# Patient Record
Sex: FEMALE | Race: WHITE | NOT HISPANIC OR LATINO | Employment: OTHER | URBAN - NONMETROPOLITAN AREA
[De-identification: names, ages, dates, MRNs, and addresses within clinical notes are randomized per-mention and may not be internally consistent; named-entity substitution may affect disease eponyms.]

---

## 2018-08-28 ENCOUNTER — HOSPITAL ENCOUNTER (OUTPATIENT)
Dept: MAMMOGRAPHY | Facility: HOSPITAL | Age: 73
Discharge: HOME | End: 2018-08-28
Admitting: NURSE PRACTITIONER

## 2018-08-28 DIAGNOSIS — Z12.39 SCREENING BREAST EXAMINATION: ICD-10-CM

## 2018-08-28 PROCEDURE — 77067 SCR MAMMO BI INCL CAD: CPT | Performed by: RADIOLOGY

## 2018-08-28 PROCEDURE — 77063 BREAST TOMOSYNTHESIS BI: CPT

## 2018-08-28 PROCEDURE — 77067 SCR MAMMO BI INCL CAD: CPT

## 2018-08-28 PROCEDURE — 77063 BREAST TOMOSYNTHESIS BI: CPT | Performed by: RADIOLOGY

## 2019-05-08 ENCOUNTER — OFFICE VISIT (OUTPATIENT)
Dept: CARDIOLOGY | Facility: CLINIC | Age: 74
End: 2019-05-08

## 2019-05-08 VITALS
WEIGHT: 199 LBS | BODY MASS INDEX: 31.98 KG/M2 | HEIGHT: 66 IN | HEART RATE: 69 BPM | OXYGEN SATURATION: 98 % | SYSTOLIC BLOOD PRESSURE: 132 MMHG | DIASTOLIC BLOOD PRESSURE: 68 MMHG

## 2019-05-08 DIAGNOSIS — R07.9 CHEST PAIN IN ADULT: Primary | ICD-10-CM

## 2019-05-08 DIAGNOSIS — I10 ESSENTIAL HYPERTENSION: ICD-10-CM

## 2019-05-08 DIAGNOSIS — R06.09 DOE (DYSPNEA ON EXERTION): ICD-10-CM

## 2019-05-08 DIAGNOSIS — E78.00 HYPERCHOLESTEROLEMIA: ICD-10-CM

## 2019-05-08 PROCEDURE — 99204 OFFICE O/P NEW MOD 45 MIN: CPT | Performed by: INTERNAL MEDICINE

## 2019-05-08 RX ORDER — LANOLIN ALCOHOL/MO/W.PET/CERES
CREAM (GRAM) TOPICAL
COMMUNITY
Start: 2018-12-12 | End: 2022-05-14

## 2019-05-08 RX ORDER — AMLODIPINE BESYLATE 5 MG/1
1 TABLET ORAL EVERY 12 HOURS
COMMUNITY
Start: 2018-07-23 | End: 2022-05-14

## 2019-05-08 RX ORDER — ASPIRIN 81 MG/1
81 TABLET ORAL DAILY
COMMUNITY
Start: 2018-07-23 | End: 2022-09-08 | Stop reason: HOSPADM

## 2019-05-08 NOTE — PROGRESS NOTES
Subjective   Chief Complaint   Patient presents with   • Shortness of Breath   • Dizziness       History of Present Illness    Patient is 74 years old white female who is here for cardiac evaluation because of dull chest tightness shortness of breath and dizziness.  This has been going on for for 5 months.  Patient thinks it is getting worse.  Chest tightness is located in the substernal and left anterior chest.  Is quite vague.  There is no radiation of the pain.  Primarily she gets very short of breath and dizzy on minimal exertion.  Patient has no known coronary artery disease but has family history of premature coronary artery disease.    Her prior cardiac work-up includes coronary angiography about 10 years ago by Dr. Ortega which apparently did not show any significant disease  She had a stress test done 5 years ago which was negative for ischemia.  Risk factors include hypertension and hyperlipidemia.  Patient has prior history of stroke with the right hemiplegia which resolved completely after thrombolyzes with TPA.  Currently she is on aspirin and Plavix.    Patient does not smoke   Past Surgical History:   Procedure Laterality Date   • CATARACT EXTRACTION     • CHOLECYSTECTOMY     • COLON SURGERY     • KNEE SURGERY Left      Family History   Problem Relation Age of Onset   • Heart disease Mother    • Heart disease Father    • Cancer Brother    • Breast cancer Paternal Aunt      Past Medical History:   Diagnosis Date   • Hyperlipidemia    • Hypertension 5/16/2016   • Stroke (CMS/HCC) 5/16/2016 2009 2015       Patient Active Problem List   Diagnosis   • Hypertension   • Stroke (CMS/HCC)   • Hypercholesterolemia   • Knee pain   • Osteoarthritis of knee   • Sleep apnea   • Status post total left knee replacement   • Chest pain in adult   • ESPINAL (dyspnea on exertion)         Social History     Tobacco Use   • Smoking status: Never Smoker   • Smokeless tobacco: Never Used   Substance Use Topics   • Alcohol use:  "No   • Drug use: No         The following portions of the patient's history were reviewed and updated as appropriate: allergies, current medications, past family history, past medical history, past social history, past surgical history and problem list.    Allergies   Allergen Reactions   • Morphine And Related          Current Outpatient Medications:   •  amLODIPine (NORVASC) 5 MG tablet, Take 1 tablet by mouth Every 12 (Twelve) Hours., Disp: , Rfl:   •  aspirin 325 MG tablet, Take 1 tablet by mouth., Disp: , Rfl:   •  atorvastatin (LIPITOR) 20 MG tablet, , Disp: , Rfl:   •  azaTHIOprine (IMURAN) 50 MG tablet, , Disp: , Rfl:   •  clopidogrel (PLAVIX) 75 MG tablet, , Disp: , Rfl:   •  Ergocalciferol (VITAMIN D2 PO), Take 1 capsule by mouth., Disp: , Rfl:   •  famotidine (PEPCID) 20 MG tablet, , Disp: , Rfl:   •  folic acid (FOLVITE) 1 MG tablet, Take 1 mg by mouth daily., Disp: , Rfl:   •  hydrochlorothiazide (HYDRODIURIL) 25 MG tablet, , Disp: , Rfl:   •  lisinopril (PRINIVIL,ZESTRIL) 5 MG tablet, , Disp: , Rfl:   •  vitamin B-12 (CYANOCOBALAMIN) 1000 MCG tablet, INJECT ONE MILLILITER INTRAMUSCULARLY MONTHLY, Disp: , Rfl:     Review of Systems   Constitution: Negative.   HENT: Negative.  Negative for congestion.    Eyes: Negative.    Cardiovascular: Positive for chest pain. Negative for cyanosis, dyspnea on exertion, irregular heartbeat, leg swelling, near-syncope, orthopnea, palpitations, paroxysmal nocturnal dyspnea and syncope.   Respiratory: Positive for shortness of breath.    Endocrine: Negative.    Hematologic/Lymphatic: Negative.    Skin: Negative.    Musculoskeletal: Negative.    Gastrointestinal: Negative.    Genitourinary: Negative.    Neurological: Positive for dizziness. Negative for headaches.   Psychiatric/Behavioral: Negative.    Allergic/Immunologic: Negative.         Objective      /68 (BP Location: Left arm, Patient Position: Sitting)   Pulse 69   Ht 167.6 cm (66\")   Wt 90.3 kg (199 lb)  "  SpO2 98%   BMI 32.12 kg/m²     Physical Exam   Constitutional: She appears well-developed and well-nourished. No distress.   HENT:   Head: Normocephalic and atraumatic.   Mouth/Throat: Oropharynx is clear and moist. No oropharyngeal exudate.   Eyes: Conjunctivae and EOM are normal. Pupils are equal, round, and reactive to light. No scleral icterus.   Neck: Normal range of motion. Neck supple. No JVD present. No tracheal deviation present. No thyromegaly present.   Cardiovascular: Normal rate, regular rhythm, normal heart sounds and intact distal pulses. PMI is not displaced. Exam reveals no gallop, no friction rub and no decreased pulses.   No murmur heard.  Pulses:       Carotid pulses are 3+ on the right side, and 3+ on the left side.       Radial pulses are 3+ on the right side, and 3+ on the left side.   Pulmonary/Chest: Effort normal and breath sounds normal. No respiratory distress. She has no wheezes. She has no rales. She exhibits no tenderness.   Abdominal: Soft. Bowel sounds are normal. She exhibits no distension, no abdominal bruit and no mass. There is no splenomegaly or hepatomegaly. There is no tenderness. There is no rebound and no guarding.   Musculoskeletal: Normal range of motion. She exhibits no edema, tenderness or deformity.   Lymphadenopathy:     She has no cervical adenopathy.   Neurological: She is alert. She has normal reflexes. No cranial nerve deficit. She exhibits normal muscle tone. Coordination normal.   Skin: Skin is warm and dry. No rash noted. She is not diaphoretic. No erythema.   Psychiatric: She has a normal mood and affect. Her behavior is normal. Judgment and thought content normal.       Lab Review:    No results found for: BNP    Procedures    I reviewed the patient's new clinical results.  I personally viewed and interpreted the patient's EKG/lab data        Assessment:   Diagnosis Plan   1. Chest pain in adult  Stress Test With Myocardial Perfusion One Day   2. ESPINAL  (dyspnea on exertion)  Adult Transthoracic Echo Complete W/ Cont if Necessary Per Protocol    BNP   3. Essential hypertension     4. Hypercholesterolemia            Plan:  Chart reviewed.  Last stress test was in 2015 which was negative for significant exercise-induced myocardial ischemia.    Chart was also reviewed from her PCP LACI Krishnamurthy  Lipids have been normal however LDL is mildly elevated at 103.  Recent EKG was apparently normal however EKG sent to us only showed leads I, II and III which were normal.    Further cardiac work-up is indicated.  Echo and Lexiscan stress test was scheduled.  Patient states that she cannot use treadmill.  We will also get BNP because of shortness of breath.  Follow-up scheduled after the studies are completed    Thank you for giving me the oppertunity to participate in your patient's cardiac care.    Sincerely,    ESTELLA Valdez M.D. FACP FACC     No Follow-up on file.

## 2019-05-16 ENCOUNTER — HOSPITAL ENCOUNTER (OUTPATIENT)
Dept: BONE DENSITY | Facility: HOSPITAL | Age: 74
Discharge: HOME | End: 2019-05-16
Admitting: NURSE PRACTITIONER

## 2019-05-16 DIAGNOSIS — Z78.0 POSTMENOPAUSAL STATUS: ICD-10-CM

## 2019-05-16 PROCEDURE — 77080 DXA BONE DENSITY AXIAL: CPT

## 2019-05-16 PROCEDURE — 77080 DXA BONE DENSITY AXIAL: CPT | Performed by: RADIOLOGY

## 2019-05-21 ENCOUNTER — HOSPITAL ENCOUNTER (OUTPATIENT)
Dept: CARDIOLOGY | Facility: HOSPITAL | Age: 74
Discharge: HOME | End: 2019-05-21

## 2019-05-21 ENCOUNTER — LAB (OUTPATIENT)
Dept: LAB | Facility: HOSPITAL | Age: 74
End: 2019-05-21

## 2019-05-21 ENCOUNTER — HOSPITAL ENCOUNTER (OUTPATIENT)
Dept: NUCLEAR MEDICINE | Facility: HOSPITAL | Age: 74
Discharge: HOME | End: 2019-05-21

## 2019-05-21 DIAGNOSIS — R07.9 CHEST PAIN IN ADULT: ICD-10-CM

## 2019-05-21 DIAGNOSIS — R06.09 DOE (DYSPNEA ON EXERTION): ICD-10-CM

## 2019-05-21 PROCEDURE — 93017 CV STRESS TEST TRACING ONLY: CPT

## 2019-05-21 PROCEDURE — A9500 TC99M SESTAMIBI: HCPCS | Performed by: INTERNAL MEDICINE

## 2019-05-21 PROCEDURE — 36415 COLL VENOUS BLD VENIPUNCTURE: CPT

## 2019-05-21 PROCEDURE — 0 TECHNETIUM SESTAMIBI: Performed by: INTERNAL MEDICINE

## 2019-05-21 PROCEDURE — 93306 TTE W/DOPPLER COMPLETE: CPT

## 2019-05-21 PROCEDURE — 93306 TTE W/DOPPLER COMPLETE: CPT | Performed by: INTERNAL MEDICINE

## 2019-05-21 PROCEDURE — 78452 HT MUSCLE IMAGE SPECT MULT: CPT | Performed by: INTERNAL MEDICINE

## 2019-05-21 PROCEDURE — 25010000002 AMINOPHYLLINE PER 250 MG: Performed by: NURSE PRACTITIONER

## 2019-05-21 PROCEDURE — 83880 ASSAY OF NATRIURETIC PEPTIDE: CPT

## 2019-05-21 PROCEDURE — 93018 CV STRESS TEST I&R ONLY: CPT | Performed by: INTERNAL MEDICINE

## 2019-05-21 PROCEDURE — 78452 HT MUSCLE IMAGE SPECT MULT: CPT

## 2019-05-21 PROCEDURE — 25010000002 REGADENOSON 0.4 MG/5ML SOLUTION: Performed by: INTERNAL MEDICINE

## 2019-05-21 RX ORDER — AMINOPHYLLINE DIHYDRATE 25 MG/ML
125 INJECTION, SOLUTION INTRAVENOUS
Status: COMPLETED | OUTPATIENT
Start: 2019-05-21 | End: 2019-05-21

## 2019-05-21 RX ADMIN — REGADENOSON 0.4 MG: 0.08 INJECTION, SOLUTION INTRAVENOUS at 11:10

## 2019-05-21 RX ADMIN — TECHNETIUM TC 99M SESTAMIBI 1 DOSE: 1 INJECTION INTRAVENOUS at 11:10

## 2019-05-21 RX ADMIN — TECHNETIUM TC 99M SESTAMIBI 1 DOSE: 1 INJECTION INTRAVENOUS at 09:15

## 2019-05-21 RX ADMIN — AMINOPHYLLINE 125 MG: 25 INJECTION, SOLUTION INTRAVENOUS at 11:29

## 2019-05-22 ENCOUNTER — TELEPHONE (OUTPATIENT)
Dept: CARDIOLOGY | Facility: CLINIC | Age: 74
End: 2019-05-22

## 2019-05-22 LAB
BH CV ECHO MEAS - % IVS THICK: 24.4 %
BH CV ECHO MEAS - % LVPW THICK: 9.7 %
BH CV ECHO MEAS - ACS: 1.9 CM
BH CV ECHO MEAS - AO MAX PG: 10.6 MMHG
BH CV ECHO MEAS - AO MEAN PG: 5 MMHG
BH CV ECHO MEAS - AO ROOT AREA (BSA CORRECTED): 1.2
BH CV ECHO MEAS - AO ROOT AREA: 4.8 CM^2
BH CV ECHO MEAS - AO ROOT DIAM: 2.5 CM
BH CV ECHO MEAS - AO V2 MAX: 162.9 CM/SEC
BH CV ECHO MEAS - AO V2 MEAN: 101.4 CM/SEC
BH CV ECHO MEAS - AO V2 VTI: 37.8 CM
BH CV ECHO MEAS - BSA(HAYCOCK): 2.1 M^2
BH CV ECHO MEAS - BSA: 2 M^2
BH CV ECHO MEAS - BZI_BMI: 32.1 KILOGRAMS/M^2
BH CV ECHO MEAS - BZI_METRIC_HEIGHT: 167.6 CM
BH CV ECHO MEAS - BZI_METRIC_WEIGHT: 90.3 KG
BH CV ECHO MEAS - EDV(CUBED): 97.5 ML
BH CV ECHO MEAS - EDV(MOD-SP4): 58 ML
BH CV ECHO MEAS - EDV(TEICH): 97.5 ML
BH CV ECHO MEAS - EF(CUBED): 52.8 %
BH CV ECHO MEAS - EF(MOD-SP4): 63.8 %
BH CV ECHO MEAS - EF(TEICH): 44.7 %
BH CV ECHO MEAS - ESV(CUBED): 46 ML
BH CV ECHO MEAS - ESV(MOD-SP4): 21 ML
BH CV ECHO MEAS - ESV(TEICH): 53.9 ML
BH CV ECHO MEAS - FS: 22.1 %
BH CV ECHO MEAS - IVS/LVPW: 0.8
BH CV ECHO MEAS - IVSD: 0.83 CM
BH CV ECHO MEAS - IVSS: 1 CM
BH CV ECHO MEAS - LA DIMENSION: 3 CM
BH CV ECHO MEAS - LA/AO: 1.2
BH CV ECHO MEAS - LV DIASTOLIC VOL/BSA (35-75): 29.1 ML/M^2
BH CV ECHO MEAS - LV MASS(C)D: 144.1 GRAMS
BH CV ECHO MEAS - LV MASS(C)DI: 72.2 GRAMS/M^2
BH CV ECHO MEAS - LV MASS(C)S: 120.2 GRAMS
BH CV ECHO MEAS - LV MASS(C)SI: 60.2 GRAMS/M^2
BH CV ECHO MEAS - LV SYSTOLIC VOL/BSA (12-30): 10.5 ML/M^2
BH CV ECHO MEAS - LVIDD: 4.6 CM
BH CV ECHO MEAS - LVIDS: 3.6 CM
BH CV ECHO MEAS - LVLD AP4: 6.6 CM
BH CV ECHO MEAS - LVLS AP4: 5.7 CM
BH CV ECHO MEAS - LVOT AREA (M): 2.5 CM^2
BH CV ECHO MEAS - LVOT AREA: 2.6 CM^2
BH CV ECHO MEAS - LVOT DIAM: 1.8 CM
BH CV ECHO MEAS - LVPWD: 1 CM
BH CV ECHO MEAS - LVPWS: 1.1 CM
BH CV ECHO MEAS - MV A MAX VEL: 78.4 CM/SEC
BH CV ECHO MEAS - MV E MAX VEL: 97.6 CM/SEC
BH CV ECHO MEAS - MV E/A: 1.2
BH CV ECHO MEAS - PA ACC SLOPE: 1527 CM/SEC^2
BH CV ECHO MEAS - PA ACC TIME: 0.09 SEC
BH CV ECHO MEAS - PA PR(ACCEL): 39.4 MMHG
BH CV ECHO MEAS - RAP SYSTOLE: 10 MMHG
BH CV ECHO MEAS - RVSP: 49.7 MMHG
BH CV ECHO MEAS - SI(AO): 91.7 ML/M^2
BH CV ECHO MEAS - SI(CUBED): 25.8 ML/M^2
BH CV ECHO MEAS - SI(MOD-SP4): 18.5 ML/M^2
BH CV ECHO MEAS - SI(TEICH): 21.8 ML/M^2
BH CV ECHO MEAS - SV(AO): 183 ML
BH CV ECHO MEAS - SV(CUBED): 51.4 ML
BH CV ECHO MEAS - SV(MOD-SP4): 37 ML
BH CV ECHO MEAS - SV(TEICH): 43.6 ML
BH CV ECHO MEAS - TR MAX VEL: 314.9 CM/SEC
BH CV NUCLEAR PRIOR STUDY: 3
BH CV STRESS BP STAGE 1: NORMAL
BH CV STRESS BP STAGE 2: NORMAL
BH CV STRESS COMMENTS STAGE 1: NORMAL
BH CV STRESS COMMENTS STAGE 2: NORMAL
BH CV STRESS DOSE REGADENOSON STAGE 1: 0.4
BH CV STRESS DURATION MIN STAGE 1: 0
BH CV STRESS DURATION MIN STAGE 2: 4
BH CV STRESS DURATION SEC STAGE 1: 10
BH CV STRESS DURATION SEC STAGE 2: 0
BH CV STRESS HR STAGE 1: 79
BH CV STRESS HR STAGE 2: 72
BH CV STRESS PROTOCOL 1: NORMAL
BH CV STRESS RECOVERY BP: NORMAL MMHG
BH CV STRESS RECOVERY HR: 64 BPM
BH CV STRESS STAGE 1: 1
BH CV STRESS STAGE 2: 2
BNP SERPL-MCNC: 30 PG/ML (ref 0–100)
LV EF 2D ECHO EST: 60 %
LV EF NUC BP: 91 %
MAXIMAL PREDICTED HEART RATE: 145 BPM
MAXIMAL PREDICTED HEART RATE: 145 BPM
PERCENT MAX PREDICTED HR: 54.48 %
STRESS BASELINE BP: NORMAL MMHG
STRESS BASELINE HR: 60 BPM
STRESS PERCENT HR: 64 %
STRESS POST PEAK BP: NORMAL MMHG
STRESS POST PEAK HR: 79 BPM
STRESS TARGET HR: 123 BPM
STRESS TARGET HR: 123 BPM

## 2019-05-22 NOTE — TELEPHONE ENCOUNTER
----- Message from Yosi Valdez MD sent at 5/22/2019 11:39 AM EDT -----  Stress test and blood work is okay .   mild leaky valve which is not a big problem   will discuss on the visit

## 2019-06-05 ENCOUNTER — OFFICE VISIT (OUTPATIENT)
Dept: CARDIOLOGY | Facility: CLINIC | Age: 74
End: 2019-06-05

## 2019-06-05 VITALS
OXYGEN SATURATION: 95 % | WEIGHT: 197 LBS | HEART RATE: 65 BPM | BODY MASS INDEX: 31.66 KG/M2 | HEIGHT: 66 IN | SYSTOLIC BLOOD PRESSURE: 142 MMHG | DIASTOLIC BLOOD PRESSURE: 68 MMHG

## 2019-06-05 DIAGNOSIS — I10 ESSENTIAL HYPERTENSION: ICD-10-CM

## 2019-06-05 DIAGNOSIS — E78.00 HYPERCHOLESTEROLEMIA: ICD-10-CM

## 2019-06-05 DIAGNOSIS — R42 DIZZINESS: ICD-10-CM

## 2019-06-05 DIAGNOSIS — R06.09 DOE (DYSPNEA ON EXERTION): Primary | ICD-10-CM

## 2019-06-05 PROCEDURE — 99213 OFFICE O/P EST LOW 20 MIN: CPT | Performed by: INTERNAL MEDICINE

## 2019-06-05 NOTE — PROGRESS NOTES
subjective     Chief Complaint   Patient presents with   • Results     Stress and ECHO      History of Present Illness  Patient is 74 years old white female who was seen by me because of chest tightness shortness of breath and dizziness.  Patient underwent cardiac work-up.  She had echo and stress test.  Patient is here for follow-up.  Other problems consist of hypertension, hyperlipidemia and prior cerebrovascular accident.    Past Surgical History:   Procedure Laterality Date   • CATARACT EXTRACTION     • CHOLECYSTECTOMY     • COLON SURGERY     • KNEE SURGERY Left      Family History   Problem Relation Age of Onset   • Heart disease Mother    • Heart disease Father    • Cancer Brother    • Breast cancer Paternal Aunt      Past Medical History:   Diagnosis Date   • Hyperlipidemia    • Hypertension 5/16/2016   • Stroke (CMS/HCC) 5/16/2016 2009 2015     Patient Active Problem List   Diagnosis   • Hypertension   • Stroke (CMS/HCC)   • Hypercholesterolemia   • Knee pain   • Osteoarthritis of knee   • Sleep apnea   • Status post total left knee replacement   • Chest pain in adult   • ESPINAL (dyspnea on exertion)   • Dizziness       Social History     Tobacco Use   • Smoking status: Never Smoker   • Smokeless tobacco: Never Used   Substance Use Topics   • Alcohol use: No   • Drug use: No       Allergies   Allergen Reactions   • Morphine And Related        Current Outpatient Medications on File Prior to Visit   Medication Sig   • amLODIPine (NORVASC) 5 MG tablet Take 1 tablet by mouth Every 12 (Twelve) Hours.   • aspirin 325 MG tablet Take 1 tablet by mouth.   • atorvastatin (LIPITOR) 20 MG tablet    • azaTHIOprine (IMURAN) 50 MG tablet    • clopidogrel (PLAVIX) 75 MG tablet    • Ergocalciferol (VITAMIN D2 PO) Take 1 capsule by mouth.   • famotidine (PEPCID) 20 MG tablet    • folic acid (FOLVITE) 1 MG tablet Take 1 mg by mouth daily.   • hydrochlorothiazide (HYDRODIURIL) 25 MG tablet    • lisinopril (PRINIVIL,ZESTRIL) 5  "MG tablet    • vitamin B-12 (CYANOCOBALAMIN) 1000 MCG tablet INJECT ONE MILLILITER INTRAMUSCULARLY MONTHLY     No current facility-administered medications on file prior to visit.          The following portions of the patient's history were reviewed and updated as appropriate: allergies, current medications, past family history, past medical history, past social history, past surgical history and problem list.    Review of Systems   Constitution: Negative.   HENT: Negative.  Negative for congestion.    Eyes: Negative.    Cardiovascular: Negative.  Negative for chest pain, cyanosis, dyspnea on exertion, irregular heartbeat, leg swelling, near-syncope, orthopnea, palpitations, paroxysmal nocturnal dyspnea and syncope.   Respiratory: Negative.  Negative for shortness of breath.    Hematologic/Lymphatic: Negative.    Musculoskeletal: Negative.    Gastrointestinal: Negative.    Neurological: Positive for dizziness. Negative for headaches.          Objective:     /68 (BP Location: Left arm, Patient Position: Sitting)   Pulse 65   Ht 167.6 cm (66\")   Wt 89.4 kg (197 lb)   SpO2 95%   BMI 31.80 kg/m²   Physical Exam   Constitutional: She appears well-developed and well-nourished. No distress.   HENT:   Head: Normocephalic and atraumatic.   Mouth/Throat: Oropharynx is clear and moist. No oropharyngeal exudate.   Eyes: Conjunctivae and EOM are normal. Pupils are equal, round, and reactive to light. No scleral icterus.   Neck: Normal range of motion. Neck supple. No JVD present. No tracheal deviation present. No thyromegaly present.   Cardiovascular: Normal rate, regular rhythm, normal heart sounds and intact distal pulses. PMI is not displaced. Exam reveals no gallop, no friction rub and no decreased pulses.   No murmur heard.  Pulses:       Carotid pulses are 3+ on the right side, and 3+ on the left side.       Radial pulses are 3+ on the right side, and 3+ on the left side.   Pulmonary/Chest: Effort normal and " breath sounds normal. No respiratory distress. She has no wheezes. She has no rales. She exhibits no tenderness.   Abdominal: Soft. Bowel sounds are normal. She exhibits no distension, no abdominal bruit and no mass. There is no splenomegaly or hepatomegaly. There is no tenderness. There is no rebound and no guarding.   Musculoskeletal: Normal range of motion. She exhibits no edema, tenderness or deformity.   Lymphadenopathy:     She has no cervical adenopathy.   Neurological: She is alert. She has normal reflexes. No cranial nerve deficit. She exhibits normal muscle tone. Coordination normal.   Skin: Skin is warm and dry. No rash noted. She is not diaphoretic. No erythema.   Psychiatric: She has a normal mood and affect. Her behavior is normal. Judgment and thought content normal.         Lab Review        Lab Results   Component Value Date    BNP 30.0 05/21/2019       Procedures  Interpretation Summary   Lexiscan stress test  · A pharmacological stress test was performed using regadenoson with low-level exercise.  · Baseline ECG of normal sinus rhythm noted. Normal baseline ECG  · Findings consistent with a normal ECG stress test.  · Left ventricular ejection fraction is hyperdynamic (Calculated EF > 70%).  · Myocardial perfusion imaging indicates a normal myocardial perfusion study with no evidence of ischemia.  · Impressions are consistent with a low risk study.  · Compared to the prior study from 12/3/2014 the current study reveals no changes.     Interpretation Summary   Echocardiogram  · Normal left ventricular cavity size and wall thickness noted.  · Left ventricular systolic function is normal. Estimated EF = 60%.  · Left ventricular diastolic function is normal.  · No aortic valve regurgitation or aortic valve stenosis noted.  · Trace-to-mild mitral valve regurgitation is present.  · Mild tricuspid valve regurgitation is present. Mild pulmonary hypertension is present (44 mmHg).  · There is no evidence of  pericardial effusion.          I personally viewed and interpreted the patient's LAB data         Assessment:     1. ESPINAL (dyspnea on exertion)    2. Dizziness    3. Essential hypertension    4. Hypercholesterolemia          Plan:     Patient underwent cardiac work-up.  Echocardiogram shows mild tricuspid regurgitation with mild pulmonary hypertension otherwise echo was normal.  Stress test was low risk with no evidence of significant exercise-induced myocardial ischemia.    Patient complains of being weak tired fatigued and gets dizzy.  She is not sure if she is having any palpitations or if there is any heartbeat irregularity.  We will check a Holter monitor for further evaluation of dizziness.  She was advised to discuss disease Angelito with her neurologist also.  Follow-up scheduled I do not feel the patient has any significant cardiac problems at this time        No Follow-up on file.

## 2019-06-06 ENCOUNTER — HOSPITAL ENCOUNTER (OUTPATIENT)
Dept: RESPIRATORY THERAPY | Facility: HOSPITAL | Age: 74
Discharge: HOME | End: 2019-06-06
Admitting: INTERNAL MEDICINE

## 2019-06-06 DIAGNOSIS — R06.09 DOE (DYSPNEA ON EXERTION): ICD-10-CM

## 2019-06-06 PROCEDURE — 93226 XTRNL ECG REC<48 HR SCAN A/R: CPT

## 2019-06-06 PROCEDURE — 93225 XTRNL ECG REC<48 HRS REC: CPT

## 2019-06-08 PROCEDURE — 93227 XTRNL ECG REC<48 HR R&I: CPT | Performed by: INTERNAL MEDICINE

## 2019-06-17 ENCOUNTER — TELEPHONE (OUTPATIENT)
Dept: CARDIOLOGY | Facility: CLINIC | Age: 74
End: 2019-06-17

## 2019-06-17 NOTE — TELEPHONE ENCOUNTER
----- Message from Yosi Valdez MD sent at 6/17/2019 10:24 AM EDT -----  Holter monitor does not show any significant arrhythmia occasional 3 beat run was noted

## 2019-08-05 ENCOUNTER — OFFICE VISIT (OUTPATIENT)
Dept: CARDIOLOGY | Facility: CLINIC | Age: 74
End: 2019-08-05

## 2019-08-05 VITALS
BODY MASS INDEX: 31.34 KG/M2 | WEIGHT: 195 LBS | OXYGEN SATURATION: 98 % | SYSTOLIC BLOOD PRESSURE: 136 MMHG | DIASTOLIC BLOOD PRESSURE: 74 MMHG | HEIGHT: 66 IN | HEART RATE: 76 BPM

## 2019-08-05 DIAGNOSIS — E78.00 HYPERCHOLESTEROLEMIA: Primary | ICD-10-CM

## 2019-08-05 DIAGNOSIS — I10 ESSENTIAL HYPERTENSION: ICD-10-CM

## 2019-08-05 DIAGNOSIS — I49.1 PREMATURE ATRIAL CONTRACTION: ICD-10-CM

## 2019-08-05 PROCEDURE — 99213 OFFICE O/P EST LOW 20 MIN: CPT | Performed by: INTERNAL MEDICINE

## 2019-08-05 NOTE — PROGRESS NOTES
subjective     Chief Complaint   Patient presents with   • Hypertension     History of Present Illness    Patient is 74 years old white female who was seen by me because of chest tightness.  Patient underwent cardiac work-up including stress and echo.  She has history of hypertension, hyperlipidemia and prior cerebrovascular accident.  Patient is here for follow-up.  She has no specific new complaints.  Past Surgical History:   Procedure Laterality Date   • CATARACT EXTRACTION     • CHOLECYSTECTOMY     • COLON SURGERY     • KNEE SURGERY Left      Family History   Problem Relation Age of Onset   • Heart disease Mother    • Heart disease Father    • Cancer Brother    • Breast cancer Paternal Aunt      Past Medical History:   Diagnosis Date   • Hyperlipidemia    • Hypertension 5/16/2016   • Stroke (CMS/HCC) 5/16/2016 2009 2015     Patient Active Problem List   Diagnosis   • Hypertension   • Stroke (CMS/HCC)   • Hypercholesterolemia   • Knee pain   • Osteoarthritis of knee   • Sleep apnea   • Status post total left knee replacement   • Chest pain in adult   • ESPINAL (dyspnea on exertion)   • Dizziness   • Premature atrial contraction       Social History     Tobacco Use   • Smoking status: Never Smoker   • Smokeless tobacco: Never Used   Substance Use Topics   • Alcohol use: No   • Drug use: No       Allergies   Allergen Reactions   • Morphine And Related        Current Outpatient Medications on File Prior to Visit   Medication Sig   • amLODIPine (NORVASC) 5 MG tablet Take 1 tablet by mouth Every 12 (Twelve) Hours.   • aspirin 325 MG tablet Take 1 tablet by mouth.   • atorvastatin (LIPITOR) 20 MG tablet    • azaTHIOprine (IMURAN) 50 MG tablet    • clopidogrel (PLAVIX) 75 MG tablet    • Ergocalciferol (VITAMIN D2 PO) Take 1 capsule by mouth.   • famotidine (PEPCID) 20 MG tablet    • folic acid (FOLVITE) 1 MG tablet Take 1 mg by mouth daily.   • hydrochlorothiazide (HYDRODIURIL) 25 MG tablet    • lisinopril  "(PRINIVIL,ZESTRIL) 5 MG tablet    • vitamin B-12 (CYANOCOBALAMIN) 1000 MCG tablet INJECT ONE MILLILITER INTRAMUSCULARLY MONTHLY     No current facility-administered medications on file prior to visit.          The following portions of the patient's history were reviewed and updated as appropriate: allergies, current medications, past family history, past medical history, past social history, past surgical history and problem list.    Review of Systems   Constitution: Negative.   HENT: Negative.  Negative for congestion.    Eyes: Negative.    Cardiovascular: Negative.  Negative for chest pain, cyanosis, dyspnea on exertion, irregular heartbeat, leg swelling, near-syncope, orthopnea, palpitations, paroxysmal nocturnal dyspnea and syncope.   Respiratory: Negative.  Negative for shortness of breath.    Hematologic/Lymphatic: Negative.    Musculoskeletal: Negative.    Gastrointestinal: Negative.    Neurological: Negative.  Negative for headaches.          Objective:     /74 (BP Location: Left arm, Patient Position: Sitting)   Pulse 76   Ht 167.6 cm (66\")   Wt 88.5 kg (195 lb)   SpO2 98%   BMI 31.47 kg/m²   Physical Exam   Constitutional: She appears well-developed and well-nourished. No distress.   HENT:   Head: Normocephalic and atraumatic.   Mouth/Throat: Oropharynx is clear and moist. No oropharyngeal exudate.   Eyes: Conjunctivae and EOM are normal. Pupils are equal, round, and reactive to light. No scleral icterus.   Neck: Normal range of motion. Neck supple. No JVD present. No tracheal deviation present. No thyromegaly present.   Cardiovascular: Normal rate, regular rhythm, normal heart sounds and intact distal pulses. PMI is not displaced. Exam reveals no gallop, no friction rub and no decreased pulses.   No murmur heard.  Pulses:       Carotid pulses are 3+ on the right side, and 3+ on the left side.       Radial pulses are 3+ on the right side, and 3+ on the left side.   Pulmonary/Chest: Effort " normal and breath sounds normal. No respiratory distress. She has no wheezes. She has no rales. She exhibits no tenderness.   Abdominal: Soft. Bowel sounds are normal. She exhibits no distension, no abdominal bruit and no mass. There is no splenomegaly or hepatomegaly. There is no tenderness. There is no rebound and no guarding.   Musculoskeletal: Normal range of motion. She exhibits no edema, tenderness or deformity.   Lymphadenopathy:     She has no cervical adenopathy.   Neurological: She is alert. She has normal reflexes. No cranial nerve deficit. She exhibits normal muscle tone. Coordination normal.   Skin: Skin is warm and dry. No rash noted. She is not diaphoretic. No erythema.   Psychiatric: She has a normal mood and affect. Her behavior is normal. Judgment and thought content normal.         Lab Review      Procedures  Interpretation Summary     · A relatively benign monitor study.  · The patient was monitored for 23 hours and 59 minutes.  · Average HR: 67. Min HR: 45. Max HR: 98.  · The predominant rhythm noted during the testing period was sinus rhythm.  · Premature atrial contractions occured occasionally. Occasional PAC pairs  · 3 atrial runs of 3 beats each around 124 beats/min.  · Premature ventricular contractions occured occasionally.  · No pause, No atrial fibrillation.          I personally viewed and interpreted the patient's LAB data         Assessment:     1. Hypercholesterolemia    2. Essential hypertension    3. Premature atrial contraction          Plan:   Patient seems be doing very well.  She had a stress test done which was negative for significant exercise-induced myocardial ischemia, echocardiogram showed no significant valvular heart disease LV ejection fraction was normal.  Holter monitor only showed 3 episodes of atrial triplets otherwise no significant arrhythmia.    No change in therapy was made.  Patient is completely asymptomatic at this time.  No therapy was initiated for PACs  because of.'s of bradycardia.  Patient will be followed by me only on PRN basis and will follow closely with LACI Krishnamurthy    No Follow-up on file.

## 2019-12-03 ENCOUNTER — TRANSCRIBE ORDERS (OUTPATIENT)
Dept: OTHER | Facility: OTHER | Age: 74
End: 2019-12-03

## 2019-12-03 DIAGNOSIS — R06.02 SHORTNESS OF BREATH: Primary | ICD-10-CM

## 2019-12-12 ENCOUNTER — HOSPITAL ENCOUNTER (OUTPATIENT)
Dept: RESPIRATORY THERAPY | Facility: HOSPITAL | Age: 74
Discharge: HOME | End: 2019-12-12
Admitting: NURSE PRACTITIONER

## 2019-12-12 DIAGNOSIS — R06.02 SHORTNESS OF BREATH: ICD-10-CM

## 2019-12-12 PROCEDURE — 94010 BREATHING CAPACITY TEST: CPT | Performed by: INTERNAL MEDICINE

## 2019-12-12 PROCEDURE — 94010 BREATHING CAPACITY TEST: CPT

## 2020-01-21 ENCOUNTER — TRANSCRIBE ORDERS (OUTPATIENT)
Dept: ADMINISTRATIVE | Facility: HOSPITAL | Age: 75
End: 2020-01-21

## 2020-01-22 ENCOUNTER — TRANSCRIBE ORDERS (OUTPATIENT)
Dept: OTHER | Facility: OTHER | Age: 75
End: 2020-01-22

## 2020-01-22 DIAGNOSIS — R06.02 SHORTNESS OF BREATH: Primary | ICD-10-CM

## 2020-01-27 ENCOUNTER — HOSPITAL ENCOUNTER (OUTPATIENT)
Dept: RESPIRATORY THERAPY | Facility: HOSPITAL | Age: 75
Discharge: HOME | End: 2020-01-27
Admitting: NURSE PRACTITIONER

## 2020-01-27 DIAGNOSIS — R06.02 SHORTNESS OF BREATH: ICD-10-CM

## 2020-01-27 PROCEDURE — 94060 EVALUATION OF WHEEZING: CPT | Performed by: INTERNAL MEDICINE

## 2020-01-27 PROCEDURE — 94060 EVALUATION OF WHEEZING: CPT

## 2020-02-12 ENCOUNTER — HOSPITAL ENCOUNTER (OUTPATIENT)
Dept: MAMMOGRAPHY | Facility: HOSPITAL | Age: 75
Discharge: HOME | End: 2020-02-12
Admitting: NURSE PRACTITIONER

## 2020-02-12 DIAGNOSIS — Z12.31 VISIT FOR SCREENING MAMMOGRAM: ICD-10-CM

## 2020-02-12 PROCEDURE — 77067 SCR MAMMO BI INCL CAD: CPT | Performed by: RADIOLOGY

## 2020-02-12 PROCEDURE — 77063 BREAST TOMOSYNTHESIS BI: CPT | Performed by: RADIOLOGY

## 2020-02-12 PROCEDURE — 77067 SCR MAMMO BI INCL CAD: CPT

## 2020-02-12 PROCEDURE — 77063 BREAST TOMOSYNTHESIS BI: CPT

## 2020-02-28 DIAGNOSIS — R06.02 SHORTNESS OF BREATH: Primary | ICD-10-CM

## 2020-03-03 ENCOUNTER — OFFICE VISIT (OUTPATIENT)
Dept: PULMONOLOGY | Facility: CLINIC | Age: 75
End: 2020-03-03

## 2020-03-03 ENCOUNTER — HOSPITAL ENCOUNTER (OUTPATIENT)
Dept: GENERAL RADIOLOGY | Facility: HOSPITAL | Age: 75
Discharge: HOME | End: 2020-03-03
Admitting: INTERNAL MEDICINE

## 2020-03-03 VITALS
WEIGHT: 197 LBS | HEIGHT: 66 IN | OXYGEN SATURATION: 94 % | HEART RATE: 64 BPM | BODY MASS INDEX: 31.66 KG/M2 | DIASTOLIC BLOOD PRESSURE: 75 MMHG | TEMPERATURE: 98 F | SYSTOLIC BLOOD PRESSURE: 131 MMHG

## 2020-03-03 DIAGNOSIS — G47.33 OBSTRUCTIVE SLEEP APNEA: ICD-10-CM

## 2020-03-03 DIAGNOSIS — I27.20 PULMONARY HYPERTENSION (HCC): ICD-10-CM

## 2020-03-03 DIAGNOSIS — R06.02 SHORTNESS OF BREATH: Primary | ICD-10-CM

## 2020-03-03 PROCEDURE — 99202 OFFICE O/P NEW SF 15 MIN: CPT | Performed by: INTERNAL MEDICINE

## 2020-03-03 PROCEDURE — 94010 BREATHING CAPACITY TEST: CPT | Performed by: INTERNAL MEDICINE

## 2020-03-03 PROCEDURE — 71046 X-RAY EXAM CHEST 2 VIEWS: CPT | Performed by: RADIOLOGY

## 2020-03-03 PROCEDURE — 71046 X-RAY EXAM CHEST 2 VIEWS: CPT

## 2020-03-03 RX ORDER — ALBUTEROL SULFATE 90 UG/1
2 AEROSOL, METERED RESPIRATORY (INHALATION) EVERY 4 HOURS PRN
Qty: 18 G | Refills: 11 | Status: SHIPPED | OUTPATIENT
Start: 2020-03-03 | End: 2022-05-14

## 2020-03-03 NOTE — PROGRESS NOTES
Subjective   Chief Complaint   Patient presents with   • Shortness of Breath       Raisa Hernández is a 74 y.o. female     History of Present Illness 74-year-old female referred for evaluation of shortness of breath she is complaining of having shortness of breath on exertion for the last few months had a stress test that by Dr. Valdez that was okay and had echo that showed mild pulmonary hypertension estimated pressure of 44 mm she is a non-smoker with history of having colon cancer surgery some 25 years ago stroke a few years ago for which she is on Plavix and aspirin obstructive apnea for which she is on CPAP for the last 12 years with oxygen her other medications are amlodipine and 5 mg of lisinopril per day    Review of Systems shortness of breath on exertion no cough or wheezing no chest pain on exertion and no edema no history of having Blood clots    Family History   Problem Relation Age of Onset   • Heart disease Mother    • Heart disease Father    • Cancer Brother    • Breast cancer Paternal Aunt    • Breast cancer Paternal Aunt        Past Medical History:   Diagnosis Date   • Hyperlipidemia    • Hypertension 5/16/2016   • Stroke (CMS/HCC) 5/16/2016 2009 2015       Past Surgical History:   Procedure Laterality Date   • CATARACT EXTRACTION     • CHOLECYSTECTOMY     • COLON SURGERY     • KNEE SURGERY Left        Social History     Socioeconomic History   • Marital status:      Spouse name: Not on file   • Number of children: Not on file   • Years of education: Not on file   • Highest education level: Not on file   Tobacco Use   • Smoking status: Never Smoker   • Smokeless tobacco: Never Used   Substance and Sexual Activity   • Alcohol use: No   • Drug use: No   • Sexual activity: Never        Physical Exam: Slightly overweight weighing 197 pounds for height of 5 6 blood pressure 131/75 sat 94% on room air heart regular lungs clear no clubbing cyanosis joint deformity or edema or DVT    PFT: Almost  normal FVC 73% FEV1 85% she had 2 PFTs previously at the hospital with normal forced vital capacity and FEV1    Imaging: Clear no cardiomegaly    Other Labs: 2 previous PFTs at the hospital were normal      ASSESSMENT1-shortness of breath on exertion with normal chest x-ray and PFT history of functional2-obstructive apnea per history3-pulmonary hypertension mild detected on echo of May 2019 could be related to obstructive apnea        Recommendations: The patient was instructed to try to lose weight increase activity gradually and trying a Ventolin inhaler before or after shortness of breath on exertion get a repeat echo cardiogram    Follow up: Return in 4 weeks to report the results of scale inhaler and reviewed the report of echocardiogram circularly if she has increasing Pulmonary hypertension it may require further intervention such as checking for recurrent pulmonary embolus

## 2020-03-05 ENCOUNTER — HOSPITAL ENCOUNTER (OUTPATIENT)
Dept: CARDIOLOGY | Facility: HOSPITAL | Age: 75
Discharge: HOME | End: 2020-03-05
Admitting: INTERNAL MEDICINE

## 2020-03-05 DIAGNOSIS — R06.02 SHORTNESS OF BREATH: ICD-10-CM

## 2020-03-05 DIAGNOSIS — I27.20 PULMONARY HYPERTENSION (HCC): ICD-10-CM

## 2020-03-05 PROCEDURE — 93306 TTE W/DOPPLER COMPLETE: CPT

## 2020-03-05 PROCEDURE — 93306 TTE W/DOPPLER COMPLETE: CPT | Performed by: INTERNAL MEDICINE

## 2020-03-06 LAB
BH CV ECHO MEAS - % IVS THICK: 35.6 %
BH CV ECHO MEAS - % LVPW THICK: 98 %
BH CV ECHO MEAS - ACS: 1.7 CM
BH CV ECHO MEAS - AO ROOT AREA (BSA CORRECTED): 1.4
BH CV ECHO MEAS - AO ROOT AREA: 5.7 CM^2
BH CV ECHO MEAS - AO ROOT DIAM: 2.7 CM
BH CV ECHO MEAS - BSA(HAYCOCK): 2.1 M^2
BH CV ECHO MEAS - BSA: 2 M^2
BH CV ECHO MEAS - BZI_BMI: 31.8 KILOGRAMS/M^2
BH CV ECHO MEAS - BZI_METRIC_HEIGHT: 167.6 CM
BH CV ECHO MEAS - BZI_METRIC_WEIGHT: 89.4 KG
BH CV ECHO MEAS - EDV(CUBED): 84.9 ML
BH CV ECHO MEAS - EDV(MOD-SP4): 39.9 ML
BH CV ECHO MEAS - EDV(TEICH): 87.5 ML
BH CV ECHO MEAS - EF(CUBED): 75.5 %
BH CV ECHO MEAS - EF(MOD-SP4): 78.2 %
BH CV ECHO MEAS - EF(TEICH): 67.7 %
BH CV ECHO MEAS - ESV(CUBED): 20.8 ML
BH CV ECHO MEAS - ESV(MOD-SP4): 8.7 ML
BH CV ECHO MEAS - ESV(TEICH): 28.3 ML
BH CV ECHO MEAS - FS: 37.4 %
BH CV ECHO MEAS - IVS/LVPW: 1.2
BH CV ECHO MEAS - IVSD: 1.1 CM
BH CV ECHO MEAS - IVSS: 1.4 CM
BH CV ECHO MEAS - LA DIMENSION: 4.2 CM
BH CV ECHO MEAS - LA/AO: 1.5
BH CV ECHO MEAS - LV DIASTOLIC VOL/BSA (35-75): 20.1 ML/M^2
BH CV ECHO MEAS - LV MASS(C)D: 144.1 GRAMS
BH CV ECHO MEAS - LV MASS(C)DI: 72.5 GRAMS/M^2
BH CV ECHO MEAS - LV MASS(C)S: 161.2 GRAMS
BH CV ECHO MEAS - LV MASS(C)SI: 81.1 GRAMS/M^2
BH CV ECHO MEAS - LV SYSTOLIC VOL/BSA (12-30): 4.4 ML/M^2
BH CV ECHO MEAS - LVIDD: 4.4 CM
BH CV ECHO MEAS - LVIDS: 2.8 CM
BH CV ECHO MEAS - LVLD AP4: 6.2 CM
BH CV ECHO MEAS - LVLS AP4: 5.2 CM
BH CV ECHO MEAS - LVOT AREA (M): 1.8 CM^2
BH CV ECHO MEAS - LVOT AREA: 1.8 CM^2
BH CV ECHO MEAS - LVOT DIAM: 1.5 CM
BH CV ECHO MEAS - LVPWD: 0.9 CM
BH CV ECHO MEAS - LVPWS: 1.8 CM
BH CV ECHO MEAS - MV A MAX VEL: 81 CM/SEC
BH CV ECHO MEAS - MV E MAX VEL: 81.8 CM/SEC
BH CV ECHO MEAS - MV E/A: 1
BH CV ECHO MEAS - PA ACC TIME: 0.09 SEC
BH CV ECHO MEAS - PA PR(ACCEL): 39.4 MMHG
BH CV ECHO MEAS - RAP SYSTOLE: 10 MMHG
BH CV ECHO MEAS - RVDD: 1.1 CM
BH CV ECHO MEAS - RVSP: 40.2 MMHG
BH CV ECHO MEAS - SI(CUBED): 32.3 ML/M^2
BH CV ECHO MEAS - SI(MOD-SP4): 15.7 ML/M^2
BH CV ECHO MEAS - SI(TEICH): 29.8 ML/M^2
BH CV ECHO MEAS - SV(CUBED): 64.1 ML
BH CV ECHO MEAS - SV(MOD-SP4): 31.2 ML
BH CV ECHO MEAS - SV(TEICH): 59.2 ML
BH CV ECHO MEAS - TR MAX VEL: 274.4 CM/SEC
MAXIMAL PREDICTED HEART RATE: 145 BPM
STRESS TARGET HR: 123 BPM

## 2020-05-13 ENCOUNTER — OFFICE VISIT (OUTPATIENT)
Dept: PULMONOLOGY | Facility: CLINIC | Age: 75
End: 2020-05-13

## 2020-05-13 DIAGNOSIS — G47.33 OBSTRUCTIVE SLEEP APNEA: Primary | ICD-10-CM

## 2020-05-13 DIAGNOSIS — I27.20 PULMONARY HYPERTENSION (HCC): ICD-10-CM

## 2020-05-13 PROCEDURE — 99441 PR PHYS/QHP TELEPHONE EVALUATION 5-10 MIN: CPT | Performed by: INTERNAL MEDICINE

## 2020-05-13 NOTE — PROGRESS NOTES
Subjective   Chief Complaint   Patient presents with   • Shortness of Breath       Raisa Hernández is a 75 y.o. female Mrs. Kline fully consented to telephone conversation called her in the morning off Wednesday, May 13 talk to her about 10 minutes    History of Present Illness 75-year-old female that was seen in the office on March 4 for evaluation of shortness of breath on exertion that she had for few years worse in the last few months past history significant for having obstructive apnea for which he has been on oxygen and CPAP for 12years the stroke about 10 years ago and has gained quite with her weight because of prednisone that she used she had an echo by Dr. Valdez May 2019 that showed a pulmonary hypertension of 44 mm.  Last visit her exam was unremarkable chest x-ray was clear she had no cardiomegaly and her PFT was unremarkable mild restrictive impairment with F VC of 73% FEV1 of 85% she was advised to lose weight and was given a Ventolin inhaler to try and ordered a repeat echocardiogram    Review of Systems continues to have some shortness of breath on exertion has tried ventilating that seems to help no chest pain palpitation edema no signs of neurologic deficit and uses her CPAP all night with oxygen    Family History   Problem Relation Age of Onset   • Heart disease Mother    • Heart disease Father    • Cancer Brother    • Breast cancer Paternal Aunt    • Breast cancer Paternal Aunt        Past Medical History:   Diagnosis Date   • Hyperlipidemia    • Hypertension 5/16/2016   • Stroke (CMS/HCC) 5/16/2016 2009 2015       Past Surgical History:   Procedure Laterality Date   • CATARACT EXTRACTION     • CHOLECYSTECTOMY     • COLON SURGERY     • KNEE SURGERY Left        Social History     Socioeconomic History   • Marital status:      Spouse name: Not on file   • Number of children: Not on file   • Years of education: Not on file   • Highest education level: Not on file   Tobacco Use   • Smoking  status: Never Smoker   • Smokeless tobacco: Never Used   Substance and Sexual Activity   • Alcohol use: No   • Drug use: No   • Sexual activity: Never        Physical Exam: No physical exam as it was telephone conversation she reported a weight of 198 pounds for a height of 5 6    PFT: PFT March 14 office showed FVC of 73 FEV1 of 85% mild restrictive impairment due to obesity    Imaging:    Other Labs:       ASSESSMENT1-obstructive sleep apnea per history using CPAP at night with oxygen2-pulmonary hypertension possibly related to #1 and obesity unchanged in a year        Recommendations: Advised her again try to lose weight and use Ventolin inhaler 1 puff before exertion and at may prevent her from having shortness of breath and 1 puff after experiencing during shortness of breath    Follow up: By your primary care and Dr. Valdez the cardiologist call our office in about 3 months to see if the office is open for business and call earlier in case of increasing shortness of breath continue social distancing for the time being care to avoid chronic infection    Talk to the patient for 10 minutes

## 2021-01-27 ENCOUNTER — APPOINTMENT (OUTPATIENT)
Dept: VACCINE CLINIC | Facility: HOSPITAL | Age: 76
End: 2021-01-27

## 2021-01-27 ENCOUNTER — IMMUNIZATION (OUTPATIENT)
Dept: VACCINE CLINIC | Facility: HOSPITAL | Age: 76
End: 2021-01-27

## 2021-01-27 PROCEDURE — 91300 HC SARSCOV02 VAC 30MCG/0.3ML IM: CPT | Performed by: FAMILY MEDICINE

## 2021-01-27 PROCEDURE — 0001A: CPT | Performed by: FAMILY MEDICINE

## 2021-02-17 ENCOUNTER — IMMUNIZATION (OUTPATIENT)
Dept: VACCINE CLINIC | Facility: HOSPITAL | Age: 76
End: 2021-02-17

## 2021-02-17 PROCEDURE — 91300 HC SARSCOV02 VAC 30MCG/0.3ML IM: CPT | Performed by: INTERNAL MEDICINE

## 2021-02-17 PROCEDURE — 0002A: CPT | Performed by: INTERNAL MEDICINE

## 2021-04-05 ENCOUNTER — HOSPITAL ENCOUNTER (OUTPATIENT)
Dept: MAMMOGRAPHY | Facility: HOSPITAL | Age: 76
Discharge: HOME | End: 2021-04-05
Admitting: NURSE PRACTITIONER

## 2021-04-05 DIAGNOSIS — Z12.31 VISIT FOR SCREENING MAMMOGRAM: ICD-10-CM

## 2021-04-05 PROCEDURE — 77063 BREAST TOMOSYNTHESIS BI: CPT | Performed by: RADIOLOGY

## 2021-04-05 PROCEDURE — 77067 SCR MAMMO BI INCL CAD: CPT

## 2021-04-05 PROCEDURE — 77063 BREAST TOMOSYNTHESIS BI: CPT

## 2021-04-05 PROCEDURE — 77067 SCR MAMMO BI INCL CAD: CPT | Performed by: RADIOLOGY

## 2021-04-21 ENCOUNTER — HOSPITAL ENCOUNTER (OUTPATIENT)
Dept: MAMMOGRAPHY | Facility: HOSPITAL | Age: 76
Discharge: HOME | End: 2021-04-21
Admitting: RADIOLOGY

## 2021-04-21 DIAGNOSIS — R92.8 ABNORMAL MAMMOGRAM: ICD-10-CM

## 2021-04-21 PROCEDURE — 77065 DX MAMMO INCL CAD UNI: CPT

## 2021-04-21 PROCEDURE — G0279 TOMOSYNTHESIS, MAMMO: HCPCS | Performed by: RADIOLOGY

## 2021-04-21 PROCEDURE — 77065 DX MAMMO INCL CAD UNI: CPT | Performed by: RADIOLOGY

## 2021-04-21 PROCEDURE — G0279 TOMOSYNTHESIS, MAMMO: HCPCS

## 2021-05-04 ENCOUNTER — APPOINTMENT (OUTPATIENT)
Dept: MAMMOGRAPHY | Facility: HOSPITAL | Age: 76
End: 2021-05-04

## 2021-05-11 ENCOUNTER — TRANSCRIBE ORDERS (OUTPATIENT)
Dept: LAB | Facility: HOSPITAL | Age: 76
End: 2021-05-11

## 2021-05-11 DIAGNOSIS — Z01.818 OTHER SPECIFIED PRE-OPERATIVE EXAMINATION: Primary | ICD-10-CM

## 2021-05-12 ENCOUNTER — LAB (OUTPATIENT)
Dept: LAB | Facility: HOSPITAL | Age: 76
End: 2021-05-12

## 2021-05-12 DIAGNOSIS — Z01.818 OTHER SPECIFIED PRE-OPERATIVE EXAMINATION: ICD-10-CM

## 2021-05-12 PROCEDURE — U0004 COV-19 TEST NON-CDC HGH THRU: HCPCS

## 2021-05-12 PROCEDURE — C9803 HOPD COVID-19 SPEC COLLECT: HCPCS

## 2021-05-12 PROCEDURE — U0005 INFEC AGEN DETEC AMPLI PROBE: HCPCS

## 2021-05-13 LAB — SARS-COV-2 RNA NOSE QL NAA+PROBE: NOT DETECTED

## 2021-08-17 DIAGNOSIS — M25.561 RIGHT KNEE PAIN, UNSPECIFIED CHRONICITY: Primary | ICD-10-CM

## 2021-08-23 ENCOUNTER — OFFICE VISIT (OUTPATIENT)
Dept: ORTHOPEDIC SURGERY | Facility: CLINIC | Age: 76
End: 2021-08-23

## 2021-08-23 ENCOUNTER — HOSPITAL ENCOUNTER (OUTPATIENT)
Dept: GENERAL RADIOLOGY | Facility: HOSPITAL | Age: 76
Discharge: HOME | End: 2021-08-23
Admitting: ORTHOPAEDIC SURGERY

## 2021-08-23 VITALS
HEART RATE: 57 BPM | BODY MASS INDEX: 30.53 KG/M2 | HEIGHT: 66 IN | WEIGHT: 190 LBS | SYSTOLIC BLOOD PRESSURE: 141 MMHG | DIASTOLIC BLOOD PRESSURE: 80 MMHG | TEMPERATURE: 97.1 F

## 2021-08-23 DIAGNOSIS — M25.561 RIGHT KNEE PAIN, UNSPECIFIED CHRONICITY: ICD-10-CM

## 2021-08-23 DIAGNOSIS — M17.11 PRIMARY OSTEOARTHRITIS OF RIGHT KNEE: Primary | ICD-10-CM

## 2021-08-23 PROBLEM — C44.621 SQUAMOUS CELL CARCINOMA OF HAND: Status: ACTIVE | Noted: 2021-08-23

## 2021-08-23 PROCEDURE — 73562 X-RAY EXAM OF KNEE 3: CPT | Performed by: RADIOLOGY

## 2021-08-23 PROCEDURE — 73562 X-RAY EXAM OF KNEE 3: CPT

## 2021-08-23 PROCEDURE — 99203 OFFICE O/P NEW LOW 30 MIN: CPT | Performed by: ORTHOPAEDIC SURGERY

## 2021-08-23 RX ORDER — SENNOSIDES 8.6 MG
1300 CAPSULE ORAL 2 TIMES DAILY PRN
COMMUNITY

## 2021-08-23 RX ORDER — DICLOFENAC SODIUM 20 MG/G
1 SOLUTION TOPICAL 2 TIMES DAILY PRN
COMMUNITY
End: 2022-09-02

## 2021-08-23 RX ORDER — MECLIZINE HCL 25MG 25 MG/1
25 TABLET, CHEWABLE ORAL 3 TIMES DAILY PRN
COMMUNITY
End: 2022-05-14

## 2021-08-23 NOTE — PROGRESS NOTES
New Patient Visit      Patient: Raisa Hernández  YOB: 1945  Date of Encounter: 08/23/2021        Chief Complaint:   Chief Complaint   Patient presents with   • Right Knee - Initial Evaluation, Follow-up, Pain           HPI:   Raisa Hernández, 76 y.o. female, referred by Lori Valiente APRN presents with right knee pain known osteoarthritis.  She is currently under the care of Dr. Haider and has been receiving injections her last Zilretta injection was provided August 2, 2021.  She is anticipating possible need for total knee replacement on the right she has undergone left total knee replacement 2016 and is doing well.  Right knee pain is worsening.  Over the past few weeks she has struggled with ambulation in her home.  She is also had stroke in the past is currently on Plavix and is currently being worked up for poor balance.  She is currently using a compound to her right knee and he reports she is receiving some benefit.    Active Problem List:  Patient Active Problem List   Diagnosis   • Hypertensive disorder   • Thrombotic stroke (CMS/HCC)   • Hyperlipidemia   • Knee pain   • Osteoarthritis of knee   • Sleep apnea   • Status post total left knee replacement   • Angina pectoris (CMS/HCC)   • ESPINAL (dyspnea on exertion)   • Dizziness   • Premature atrial contraction   • Diplopia   • Cushingoid side effect of steroids (CMS/HCC)   • Leukocytosis   • History of stroke   • Neuropathy   • Postmenopausal osteoporosis   • Pulmonary HTN (CMS/HCC)   • Squamous cell carcinoma of hand   • Vertical nystagmus   • Hypertension         Past Medical History:  Past Medical History:   Diagnosis Date   • Hyperlipidemia    • Hypertension 5/16/2016   • Stroke (CMS/HCC) 5/16/2016 2009 2015         Past Surgical History:  Past Surgical History:   Procedure Laterality Date   • CATARACT EXTRACTION     • CHOLECYSTECTOMY     • COLON SURGERY     • KNEE SURGERY Left          Family History:  Family History   Problem Relation Age of  Onset   • Heart disease Mother    • Heart disease Father    • Cancer Brother    • Breast cancer Paternal Aunt    • Breast cancer Paternal Aunt          Social History:  Social History     Socioeconomic History   • Marital status:      Spouse name: Not on file   • Number of children: Not on file   • Years of education: Not on file   • Highest education level: Not on file   Tobacco Use   • Smoking status: Never Smoker   • Smokeless tobacco: Never Used   Vaping Use   • Vaping Use: Never used   Substance and Sexual Activity   • Alcohol use: No   • Drug use: No   • Sexual activity: Never     Body mass index is 30.67 kg/m².      Medications:  Current Outpatient Medications   Medication Sig Dispense Refill   • acetaminophen (TYLENOL) 650 MG 8 hr tablet Take 650 mg by mouth Every 8 (Eight) Hours As Needed for Mild Pain .     • albuterol sulfate  (90 Base) MCG/ACT inhaler Inhale 2 puffs Every 4 (Four) Hours As Needed for Wheezing. 18 g 11   • amLODIPine (NORVASC) 5 MG tablet Take 1 tablet by mouth Every 12 (Twelve) Hours.     • aspirin (aspirin) 81 MG EC tablet Take 1 tablet by mouth.     • atorvastatin (LIPITOR) 20 MG tablet      • azaTHIOprine (IMURAN) 50 MG tablet      • clopidogrel (PLAVIX) 75 MG tablet      • Diclofenac Sodium (Pennsaid) 2 % solution Apply  topically.     • Diclofenac Sodium (VOLTAREN) 1 % gel gel Apply 4 g topically to the appropriate area as directed 4 (Four) Times a Day As Needed.     • Ergocalciferol (VITAMIN D2 PO) Take 1 capsule by mouth.     • famotidine (PEPCID) 20 MG tablet      • folic acid (FOLVITE) 1 MG tablet Take 1 mg by mouth daily.     • hydrochlorothiazide (HYDRODIURIL) 25 MG tablet      • lisinopril (PRINIVIL,ZESTRIL) 5 MG tablet      • meclizine 25 MG chewable tablet chewable tablet Chew 25 mg 3 (Three) Times a Day As Needed.     • Multiple Vitamins-Minerals (OCUVITE ADULT 50+ PO) Take  by mouth.     • vitamin B-12 (CYANOCOBALAMIN) 1000 MCG tablet INJECT ONE MILLILITER  "INTRAMUSCULARLY MONTHLY       No current facility-administered medications for this visit.         Allergies:  Allergies   Allergen Reactions   • Morphine And Related Delirium         Review of Systems:   Review of Systems   Constitutional: Negative.    HENT: Negative.    Eyes: Negative.    Respiratory: Positive for shortness of breath.    Cardiovascular: Negative.    Gastrointestinal: Negative.    Endocrine: Negative.    Genitourinary: Negative.    Musculoskeletal: Positive for arthralgias, joint swelling and neck pain.   Skin: Negative.    Allergic/Immunologic: Negative.    Neurological: Negative.    Hematological: Bruises/bleeds easily.   Psychiatric/Behavioral: Negative.          Physical Exam:   Physical Exam  GENERAL: 76 y.o. female, alert and oriented X 3 in no acute distress.   Visit Vitals  /80   Pulse 57   Temp 97.1 °F (36.2 °C)   Ht 167.6 cm (66\")   Wt 86.2 kg (190 lb)   BMI 30.67 kg/m²         Musculoskeletal:   Examination right knee reveals 5 degree flexion contracture with minimal effusion moderate medial joint line tenderness mild varus alignment.  She has further flexion to 110 degrees with no gross instability with valgus stressing Lachman or drawer.        Radiology/Labs:     XR Knee 3 View Right    Result Date: 8/23/2021  Moderate osteoarthritis in the right knee.  This report was finalized on 8/23/2021 10:10 AM by Dr. Cholo Dueñas II, MD.      My review of radiographs right knee shows complete absence of the medial joint space with subchondral sclerosis and mild varus alignment.      Assessment & Plan:   76 y.o. female presents with moderate to advanced osteoarthritis right knee she is reaching a point that she has difficulty ambulating.  This is compounded by her poor balance.  Wishes to consider total knee replacement.  She will begin by obtaining medical clearance and pursuing work up with a neurologist. Today we provided her Deloader brace unloading medial compartment of right knee.  " Will return in the future once she has obtained medical and neurology clearance and we will consider proceeding with total knee replacement.        ICD-10-CM ICD-9-CM   1. Primary osteoarthritis of right knee  M17.11 715.16           Cc:   Lori Valiente APRN                This document has been electronically signed by Howard Estrada MD   August 24, 2021 13:54 EDT

## 2021-08-25 ENCOUNTER — PATIENT ROUNDING (BHMG ONLY) (OUTPATIENT)
Dept: ORTHOPEDIC SURGERY | Facility: CLINIC | Age: 76
End: 2021-08-25

## 2021-08-25 NOTE — PROGRESS NOTES
"August 25, 2021    Hello, may I speak with Raisa Hernández?    My name is Janiya Ring.       I am  with [facility] [place]  [facility]  [address]  [place] [address] [phone].    Before we get started may I verify your date of birth? 1945    I am calling to officially welcome you to our practice and ask about your recent visit. Is this a good time to talk? yes    Tell me about your visit with us. What things went well?  \"It went well. I had seen Dr. Estrada before but it had been several years. My visit went great, other than I will have to wait a little while for surgery due to the injections that I had. But other than that I have no problems.\"       We're always looking for ways to make our patients' experiences even better. Do you have recommendations on ways we may improve?  no    Overall were you satisfied with your first visit to our practice? yes       I appreciate you taking the time to speak with me today. Is there anything else I can do for you? no      Thank you, and have a great day.      "

## 2021-11-15 ENCOUNTER — TRANSCRIBE ORDERS (OUTPATIENT)
Dept: ADMINISTRATIVE | Facility: HOSPITAL | Age: 76
End: 2021-11-15

## 2021-11-15 DIAGNOSIS — I63.30 CEREBROVASCULAR ACCIDENT (CVA) DUE TO THROMBOSIS OF CEREBRAL ARTERY (HCC): Primary | ICD-10-CM

## 2021-11-19 ENCOUNTER — APPOINTMENT (OUTPATIENT)
Dept: MRI IMAGING | Facility: HOSPITAL | Age: 76
End: 2021-11-19

## 2021-12-08 ENCOUNTER — HOSPITAL ENCOUNTER (OUTPATIENT)
Dept: MRI IMAGING | Facility: HOSPITAL | Age: 76
Discharge: HOME | End: 2021-12-08
Admitting: NURSE PRACTITIONER

## 2021-12-08 DIAGNOSIS — I63.30 CEREBROVASCULAR ACCIDENT (CVA) DUE TO THROMBOSIS OF CEREBRAL ARTERY (HCC): ICD-10-CM

## 2021-12-08 PROCEDURE — 70551 MRI BRAIN STEM W/O DYE: CPT | Performed by: RADIOLOGY

## 2021-12-08 PROCEDURE — 70551 MRI BRAIN STEM W/O DYE: CPT

## 2022-02-07 ENCOUNTER — TRANSCRIBE ORDERS (OUTPATIENT)
Dept: ADMINISTRATIVE | Facility: HOSPITAL | Age: 77
End: 2022-02-07

## 2022-02-07 DIAGNOSIS — R06.02 SHORTNESS OF BREATH: Primary | ICD-10-CM

## 2022-02-09 ENCOUNTER — TELEPHONE (OUTPATIENT)
Dept: ORTHOPEDIC SURGERY | Facility: CLINIC | Age: 77
End: 2022-02-09

## 2022-02-09 NOTE — TELEPHONE ENCOUNTER
Provider:  DR. GUSMAN    Caller: PATIENT    Relationship to Patient:  SELF       Phone Number:  [phone]      Reason for Call: PT. CALLING TO SEE IF DR. GUSMAN HAS GOTTEN ANY NOTES OR CLEARANCE FOR SURGERY ON RIGHT KNEE FROM DR. CELESTIN.   PT. STATES THAT SHE HAD A MRI OF BRAIN 12/8/21.   SHE HAS A FOLLOW UP APPT. WITH DR. CELESTIN, NEUROLOGIST, ON 02/22/22.   SHE IS CHECKING IN WITH DR. GUSMAN TO SEE IF HE HAS RECEIVED ANYTHING FOR CLEARANCE FOR HER TO HAVE KNEE SURGERY.   ASKING IF SOMEONE FROM OFFICE WILL CALL TO ADVISE.

## 2022-02-10 NOTE — TELEPHONE ENCOUNTER
1st call: Return patients call no answer.   2nd call: Return patient call she answered, inform patient clearance forms were faxed to [employer] [phone] received fax confirmation and to Dr. Moreno [phone] received fax confirmation.

## 2022-02-17 ENCOUNTER — HOSPITAL ENCOUNTER (OUTPATIENT)
Dept: RESPIRATORY THERAPY | Facility: HOSPITAL | Age: 77
Discharge: HOME | End: 2022-02-17

## 2022-02-17 ENCOUNTER — HOSPITAL ENCOUNTER (OUTPATIENT)
Dept: CARDIOLOGY | Facility: HOSPITAL | Age: 77
Discharge: HOME | End: 2022-02-17

## 2022-02-17 ENCOUNTER — LAB (OUTPATIENT)
Dept: LAB | Facility: HOSPITAL | Age: 77
End: 2022-02-17

## 2022-02-17 ENCOUNTER — TRANSCRIBE ORDERS (OUTPATIENT)
Dept: ADMINISTRATIVE | Facility: HOSPITAL | Age: 77
End: 2022-02-17

## 2022-02-17 VITALS — RESPIRATION RATE: 16 BRPM | OXYGEN SATURATION: 96 % | HEART RATE: 68 BPM

## 2022-02-17 DIAGNOSIS — R06.02 SHORTNESS OF BREATH: ICD-10-CM

## 2022-02-17 DIAGNOSIS — Z01.818 PRE-OPERATIVE CLEARANCE: ICD-10-CM

## 2022-02-17 DIAGNOSIS — Z01.818 PRE-OPERATIVE CLEARANCE: Primary | ICD-10-CM

## 2022-02-17 LAB
BH CV ECHO MEAS - AO ROOT AREA (BSA CORRECTED): 1.3
BH CV ECHO MEAS - AO ROOT AREA: 5.3 CM^2
BH CV ECHO MEAS - AO ROOT DIAM: 2.6 CM
BH CV ECHO MEAS - BSA(HAYCOCK): 2 M^2
BH CV ECHO MEAS - BSA: 2 M^2
BH CV ECHO MEAS - BZI_BMI: 30.7 KILOGRAMS/M^2
BH CV ECHO MEAS - BZI_METRIC_HEIGHT: 167.6 CM
BH CV ECHO MEAS - BZI_METRIC_WEIGHT: 86.2 KG
BH CV ECHO MEAS - EDV(CUBED): 46.7 ML
BH CV ECHO MEAS - EDV(MOD-SP4): 28.9 ML
BH CV ECHO MEAS - EDV(TEICH): 54.4 ML
BH CV ECHO MEAS - EF(CUBED): 52.9 %
BH CV ECHO MEAS - EF(MOD-SP4): 57.1 %
BH CV ECHO MEAS - EF(TEICH): 45.7 %
BH CV ECHO MEAS - ESV(CUBED): 22 ML
BH CV ECHO MEAS - ESV(MOD-SP4): 12.4 ML
BH CV ECHO MEAS - ESV(TEICH): 29.6 ML
BH CV ECHO MEAS - FS: 22.2 %
BH CV ECHO MEAS - IVS/LVPW: 0.81
BH CV ECHO MEAS - IVSD: 1.3 CM
BH CV ECHO MEAS - LA DIMENSION: 3.6 CM
BH CV ECHO MEAS - LA/AO: 1.4
BH CV ECHO MEAS - LV DIASTOLIC VOL/BSA (35-75): 14.8 ML/M^2
BH CV ECHO MEAS - LV MASS(C)D: 190.3 GRAMS
BH CV ECHO MEAS - LV MASS(C)DI: 97.2 GRAMS/M^2
BH CV ECHO MEAS - LV SYSTOLIC VOL/BSA (12-30): 6.3 ML/M^2
BH CV ECHO MEAS - LVIDD: 3.6 CM
BH CV ECHO MEAS - LVIDS: 2.8 CM
BH CV ECHO MEAS - LVLD AP4: 5.8 CM
BH CV ECHO MEAS - LVLS AP4: 5.3 CM
BH CV ECHO MEAS - LVOT AREA (M): 2.8 CM^2
BH CV ECHO MEAS - LVOT AREA: 2.8 CM^2
BH CV ECHO MEAS - LVOT DIAM: 1.9 CM
BH CV ECHO MEAS - LVPWD: 1.6 CM
BH CV ECHO MEAS - MV A MAX VEL: 89.1 CM/SEC
BH CV ECHO MEAS - MV E MAX VEL: 75.4 CM/SEC
BH CV ECHO MEAS - MV E/A: 0.85
BH CV ECHO MEAS - PA ACC TIME: 0.06 SEC
BH CV ECHO MEAS - PA PR(ACCEL): 53.8 MMHG
BH CV ECHO MEAS - RAP SYSTOLE: 10 MMHG
BH CV ECHO MEAS - RVSP: 42.5 MMHG
BH CV ECHO MEAS - SI(CUBED): 12.6 ML/M^2
BH CV ECHO MEAS - SI(MOD-SP4): 8.4 ML/M^2
BH CV ECHO MEAS - SI(TEICH): 12.7 ML/M^2
BH CV ECHO MEAS - SV(CUBED): 24.7 ML
BH CV ECHO MEAS - SV(MOD-SP4): 16.5 ML
BH CV ECHO MEAS - SV(TEICH): 24.9 ML
BH CV ECHO MEAS - TR MAX VEL: 285 CM/SEC
FLUAV SUBTYP SPEC NAA+PROBE: NOT DETECTED
FLUBV RNA ISLT QL NAA+PROBE: NOT DETECTED
SARS-COV-2 RNA PNL SPEC NAA+PROBE: NOT DETECTED

## 2022-02-17 PROCEDURE — 94060 EVALUATION OF WHEEZING: CPT

## 2022-02-17 PROCEDURE — 94729 DIFFUSING CAPACITY: CPT

## 2022-02-17 PROCEDURE — 94727 GAS DIL/WSHOT DETER LNG VOL: CPT

## 2022-02-17 PROCEDURE — 93306 TTE W/DOPPLER COMPLETE: CPT

## 2022-02-17 PROCEDURE — 87636 SARSCOV2 & INF A&B AMP PRB: CPT | Performed by: FAMILY MEDICINE

## 2022-02-17 PROCEDURE — 94640 AIRWAY INHALATION TREATMENT: CPT

## 2022-02-17 PROCEDURE — C9803 HOPD COVID-19 SPEC COLLECT: HCPCS

## 2022-02-17 PROCEDURE — 94729 DIFFUSING CAPACITY: CPT | Performed by: INTERNAL MEDICINE

## 2022-02-17 PROCEDURE — 94799 UNLISTED PULMONARY SVC/PX: CPT

## 2022-02-17 PROCEDURE — 94727 GAS DIL/WSHOT DETER LNG VOL: CPT | Performed by: INTERNAL MEDICINE

## 2022-02-17 PROCEDURE — 94060 EVALUATION OF WHEEZING: CPT | Performed by: INTERNAL MEDICINE

## 2022-02-17 PROCEDURE — 93306 TTE W/DOPPLER COMPLETE: CPT | Performed by: INTERNAL MEDICINE

## 2022-02-17 RX ORDER — ALBUTEROL SULFATE 2.5 MG/3ML
2.5 SOLUTION RESPIRATORY (INHALATION) ONCE
Status: COMPLETED | OUTPATIENT
Start: 2022-02-17 | End: 2022-02-17

## 2022-02-17 RX ADMIN — ALBUTEROL SULFATE 2.5 MG: 2.5 SOLUTION RESPIRATORY (INHALATION) at 11:35

## 2022-05-11 ENCOUNTER — OFFICE VISIT (OUTPATIENT)
Dept: ORTHOPEDIC SURGERY | Facility: CLINIC | Age: 77
End: 2022-05-11

## 2022-05-11 ENCOUNTER — PREP FOR SURGERY (OUTPATIENT)
Dept: OTHER | Facility: HOSPITAL | Age: 77
End: 2022-05-11

## 2022-05-11 ENCOUNTER — HOSPITAL ENCOUNTER (OUTPATIENT)
Dept: GENERAL RADIOLOGY | Facility: HOSPITAL | Age: 77
Discharge: HOME | End: 2022-05-11
Admitting: ORTHOPAEDIC SURGERY

## 2022-05-11 VITALS
HEART RATE: 94 BPM | OXYGEN SATURATION: 95 % | SYSTOLIC BLOOD PRESSURE: 126 MMHG | BODY MASS INDEX: 30.54 KG/M2 | DIASTOLIC BLOOD PRESSURE: 65 MMHG | HEIGHT: 66 IN | WEIGHT: 190.04 LBS

## 2022-05-11 DIAGNOSIS — J04.0 LARYNGITIS: ICD-10-CM

## 2022-05-11 DIAGNOSIS — Z01.818 PREOPERATIVE TESTING: ICD-10-CM

## 2022-05-11 DIAGNOSIS — R05.8 PRODUCTIVE COUGH: ICD-10-CM

## 2022-05-11 DIAGNOSIS — M17.11 PRIMARY OSTEOARTHRITIS OF RIGHT KNEE: Primary | ICD-10-CM

## 2022-05-11 PROCEDURE — 73562 X-RAY EXAM OF KNEE 3: CPT

## 2022-05-11 PROCEDURE — 99214 OFFICE O/P EST MOD 30 MIN: CPT | Performed by: ORTHOPAEDIC SURGERY

## 2022-05-11 PROCEDURE — 71046 X-RAY EXAM CHEST 2 VIEWS: CPT

## 2022-05-11 PROCEDURE — 71046 X-RAY EXAM CHEST 2 VIEWS: CPT | Performed by: RADIOLOGY

## 2022-05-11 PROCEDURE — 73562 X-RAY EXAM OF KNEE 3: CPT | Performed by: RADIOLOGY

## 2022-05-11 RX ORDER — GLUCOSAMINE/CHONDR SU A SOD 750-600 MG
1 TABLET ORAL 2 TIMES DAILY
COMMUNITY
End: 2022-09-02

## 2022-05-11 RX ORDER — CEFAZOLIN SODIUM 2 G/50ML
2 SOLUTION INTRAVENOUS ONCE
Status: CANCELLED | OUTPATIENT
Start: 2022-05-24 | End: 2022-05-11

## 2022-05-11 RX ORDER — CYANOCOBALAMIN 1000 UG/ML
1000 INJECTION, SOLUTION INTRAMUSCULAR; SUBCUTANEOUS
COMMUNITY
Start: 2022-04-19

## 2022-05-11 RX ORDER — FLUTICASONE PROPIONATE 50 MCG
SPRAY, SUSPENSION (ML) NASAL
COMMUNITY
Start: 2022-05-06 | End: 2022-05-14

## 2022-05-11 RX ORDER — ERGOCALCIFEROL 1.25 MG/1
50000 CAPSULE ORAL
COMMUNITY
Start: 2022-04-19

## 2022-05-11 NOTE — PROGRESS NOTES
History and Physical      Patient: Raisa Hernández  YOB: 1945  Date of Encounter: 05/11/2022      Chief Complaint:   Chief Complaint   Patient presents with   • Right Knee - Pain, Osteoarthritis, Follow-up     Surgery Update            HPI:   Raisa Hernández, 77 y.o. female, presents in follow-up right knee osteoarthritis.  She has received numerous steroid injections with limited response.  Her pain is worsening to the point that she problems with pain with weightbearing and also has moderate pain at rest.  She also has had stroke in the past is currently anticoagulated with Plavix and aspirin and is being worked up for poor balance.  He has now been cleared for surgery.  Of note is that her breathing has recently worsened and she has been diagnosed with allergies and recently placed on medication.  Her medical history includes previous stroke previous left total knee replacement currently doing well and monitoring hypertension.        Active Problem List:  Patient Active Problem List   Diagnosis   • Hypertensive disorder   • Thrombotic stroke (HCC)   • Hyperlipidemia   • Knee pain   • Osteoarthritis of knee   • Sleep apnea   • Status post total left knee replacement   • Angina pectoris (HCC)   • ESPINAL (dyspnea on exertion)   • Dizziness   • Premature atrial contraction   • Diplopia   • Cushingoid side effect of steroids (HCC)   • Leukocytosis   • History of stroke   • Neuropathy   • Postmenopausal osteoporosis   • Pulmonary HTN (HCC)   • Squamous cell carcinoma of hand   • Vertical nystagmus   • Hypertension           Past Medical History:  Past Medical History:   Diagnosis Date   • Hyperlipidemia    • Hypertension 5/16/2016   • Stroke (HCC) 5/16/2016 2009 2015           Past Surgical History:  Past Surgical History:   Procedure Laterality Date   • CATARACT EXTRACTION     • CHOLECYSTECTOMY     • COLON SURGERY     • KNEE SURGERY Left            Family History:  Family History   Problem Relation Age of Onset   •  Heart disease Mother    • Heart disease Father    • Cancer Brother    • Breast cancer Paternal Aunt    • Breast cancer Paternal Aunt            Social History:  Social History     Socioeconomic History   • Marital status:    Tobacco Use   • Smoking status: Never Smoker   • Smokeless tobacco: Never Used   Vaping Use   • Vaping Use: Never used   Substance and Sexual Activity   • Alcohol use: No   • Drug use: No   • Sexual activity: Never     Body mass index is 30.69 kg/m².        Medications:  Current Outpatient Medications   Medication Sig Dispense Refill   • acetaminophen (TYLENOL) 650 MG 8 hr tablet Take 650 mg by mouth Every 8 (Eight) Hours As Needed for Mild Pain .     • albuterol sulfate  (90 Base) MCG/ACT inhaler Inhale 2 puffs Every 4 (Four) Hours As Needed for Wheezing. 18 g 11   • aspirin 81 MG EC tablet Take 1 tablet by mouth.     • atorvastatin (LIPITOR) 20 MG tablet      • clopidogrel (PLAVIX) 75 MG tablet      • cyanocobalamin 1000 MCG/ML injection      • Diclofenac Sodium (Pennsaid) 2 % solution Apply  topically.     • Ergocalciferol (VITAMIN D2 PO) Take 1 capsule by mouth.     • famotidine (PEPCID) 20 MG tablet      • fluticasone (FLONASE) 50 MCG/ACT nasal spray      • folic acid (FOLVITE) 1 MG tablet Take 1 mg by mouth daily.     • hydrochlorothiazide (HYDRODIURIL) 25 MG tablet      • lisinopril (PRINIVIL,ZESTRIL) 5 MG tablet      • Lutein-Zeaxanthin (Ocuvite Lutein 25) 25-5 MG capsule Take  by mouth.     • meclizine 25 MG chewable tablet chewable tablet Chew 25 mg 3 (Three) Times a Day As Needed.     • Multiple Vitamins-Minerals (OCUVITE ADULT 50+ PO) Take  by mouth.     • vitamin B-12 (CYANOCOBALAMIN) 1000 MCG tablet INJECT ONE MILLILITER INTRAMUSCULARLY MONTHLY     • vitamin D (ERGOCALCIFEROL) 1.25 MG (99483 UT) capsule capsule      • amLODIPine (NORVASC) 5 MG tablet Take 1 tablet by mouth Every 12 (Twelve) Hours.     • azaTHIOprine (IMURAN) 50 MG tablet      • Diclofenac Sodium  (VOLTAREN) 1 % gel gel Apply 4 g topically to the appropriate area as directed 4 (Four) Times a Day As Needed.       No current facility-administered medications for this visit.           Allergies:  Allergies   Allergen Reactions   • Morphine And Related Delirium           Review of Systems:   Review of Systems   Constitutional: Negative.    HENT: Positive for sore throat.    Eyes: Negative.    Respiratory: Negative.    Cardiovascular: Negative.    Gastrointestinal: Negative.    Endocrine: Negative.    Genitourinary: Negative.    Musculoskeletal: Positive for arthralgias.   Skin: Negative.    Allergic/Immunologic: Negative.    Neurological: Negative.    Hematological: Negative.    Psychiatric/Behavioral: Negative.            Physical Exam:   Physical Exam  Vitals and nursing note reviewed.   Constitutional:       General: She is not in acute distress.     Appearance: She is not diaphoretic.   HENT:      Head: Normocephalic and atraumatic.      Right Ear: External ear normal.      Left Ear: External ear normal.   Eyes:      General:         Right eye: No discharge.         Left eye: No discharge.      Conjunctiva/sclera: Conjunctivae normal.   Cardiovascular:      Rate and Rhythm: Normal rate and regular rhythm.      Heart sounds: Normal heart sounds. No murmur heard.  Pulmonary:      Effort: Pulmonary effort is normal. No respiratory distress.      Breath sounds: Normal breath sounds. No wheezing.   Abdominal:      General: There is no distension.      Palpations: Abdomen is soft.      Tenderness: There is no guarding.   Musculoskeletal:      Cervical back: Normal range of motion and neck supple.   Skin:     General: Skin is warm and dry.      Capillary Refill: Capillary refill takes less than 2 seconds.   Neurological:      Mental Status: She is alert and oriented to person, place, and time.   Psychiatric:         Behavior: Behavior normal.         Thought Content: Thought content normal.         Judgment:  "Judgment normal.       GENERAL: 77 y.o. female, alert and oriented X 3 in no acute distress.   Visit Vitals  /65   Pulse 94   Ht 167.6 cm (65.98\")   Wt 86.2 kg (190 lb 0.6 oz)   SpO2 95%   BMI 30.69 kg/m²         Musculoskeletal:   Examination right knee reveals mild effusion and moderate medial joint line tenderness she demonstrates full extension with flexion to 130 degrees with no instability.  Neurovascular exam grossly intact.        Radiology/Labs:    XR Chest 2 View    Result Date: 5/11/2022    Focal opacity right upper lobe may represent scarring or focal fibrosis. New since previous exam. Consider follow-up with CT.  This report was finalized on 5/11/2022 10:57 AM by Dr. Wilbert Hidalgo MD.      XR Knee 3 View Right    Result Date: 5/11/2022  1.  Advanced osteoarthritis of the medial knee compartment. 2.  Moderate osteophytosis of the lateral and patellofemoral compartments. 3.  Small suprapatellar joint effusion.  This report was finalized on 5/11/2022 10:57 AM by Dr. Wilbert Hidalgo MD.          Radiographs right knee obtained today compared to previous radiographs 1 year ago show moderate osteoarthritis with advanced arthritis medial compartment complete loss of joint space subchondral sclerosis and mild varus alignment.        Assessment & Plan:   77 y.o. female presents in follow-up with advanced osteoarthritis right knee continues to struggle to walk uses a cane and ambulating.  I think it is reasonable to proceed with total knee arthroplasty.  Scheduled for May 24, 2022 we discussed her shortness of breath.  Chest x-ray obtained today shows mild changes previous chest x-ray.  She wishes to proceed and understands to her risk.  Plavix will be discontinued 1 week prior to her scheduled surgery May 24, 2022.  She will continue with her aspirin.  Above is based on recommendations provided by her neurologist.      ICD-10-CM ICD-9-CM   1. Primary osteoarthritis of right knee  M17.11 715.16   2. " Productive cough  R05.8 786.2   3. Laryngitis  J04.0 464.00           Cc:   Hamlet Hopkins III, PA              This document has been electronically signed by Howard Estrada MD   May 12, 2022 20:06 EDT

## 2022-05-13 ENCOUNTER — APPOINTMENT (OUTPATIENT)
Dept: GENERAL RADIOLOGY | Facility: HOSPITAL | Age: 77
End: 2022-05-13

## 2022-05-13 ENCOUNTER — HOSPITAL ENCOUNTER (INPATIENT)
Facility: HOSPITAL | Age: 77
LOS: 5 days | Discharge: NURSING, REHAB OR LONG-TERM CARE | End: 2022-05-19
Attending: EMERGENCY MEDICINE | Admitting: HOSPITALIST

## 2022-05-13 DIAGNOSIS — J96.01 SEPSIS WITH ACUTE HYPOXIC RESPIRATORY FAILURE WITHOUT SEPTIC SHOCK, DUE TO UNSPECIFIED ORGANISM: ICD-10-CM

## 2022-05-13 DIAGNOSIS — E87.1 HYPONATREMIA: ICD-10-CM

## 2022-05-13 DIAGNOSIS — R65.20 SEPSIS WITH ACUTE HYPOXIC RESPIRATORY FAILURE WITHOUT SEPTIC SHOCK, DUE TO UNSPECIFIED ORGANISM: ICD-10-CM

## 2022-05-13 DIAGNOSIS — J18.9 PNEUMONIA OF RIGHT LUNG DUE TO INFECTIOUS ORGANISM, UNSPECIFIED PART OF LUNG: Primary | ICD-10-CM

## 2022-05-13 DIAGNOSIS — E83.42 HYPOMAGNESEMIA: ICD-10-CM

## 2022-05-13 DIAGNOSIS — A41.9 SEPSIS WITH ACUTE HYPOXIC RESPIRATORY FAILURE WITHOUT SEPTIC SHOCK, DUE TO UNSPECIFIED ORGANISM: ICD-10-CM

## 2022-05-13 LAB
A-A DO2: 46.8 MMHG (ref 0–300)
ALBUMIN SERPL-MCNC: 3.32 G/DL (ref 3.5–5.2)
ALBUMIN/GLOB SERPL: 1 G/DL
ALP SERPL-CCNC: 112 U/L (ref 39–117)
ALT SERPL W P-5'-P-CCNC: 20 U/L (ref 1–33)
ANION GAP SERPL CALCULATED.3IONS-SCNC: 16 MMOL/L (ref 5–15)
ARTERIAL PATENCY WRIST A: ABNORMAL
AST SERPL-CCNC: 28 U/L (ref 1–32)
ATMOSPHERIC PRESS: 726 MMHG
BASE EXCESS BLDA CALC-SCNC: 2.4 MMOL/L (ref 0–2)
BDY SITE: ABNORMAL
BILIRUB SERPL-MCNC: 1.1 MG/DL (ref 0–1.2)
BODY TEMPERATURE: 0 C
BUN SERPL-MCNC: 7 MG/DL (ref 8–23)
BUN/CREAT SERPL: 9.1 (ref 7–25)
CALCIUM SPEC-SCNC: 9.6 MG/DL (ref 8.6–10.5)
CHLORIDE SERPL-SCNC: 77 MMOL/L (ref 98–107)
CK SERPL-CCNC: 366 U/L (ref 20–180)
CO2 BLDA-SCNC: 27.2 MMOL/L (ref 22–33)
CO2 SERPL-SCNC: 22 MMOL/L (ref 22–29)
COHGB MFR BLD: 1.2 % (ref 0–5)
CREAT SERPL-MCNC: 0.77 MG/DL (ref 0.57–1)
CRP SERPL-MCNC: 45.23 MG/DL (ref 0–0.5)
D-LACTATE SERPL-SCNC: 1 MMOL/L (ref 0.5–2)
DEPRECATED RDW RBC AUTO: 40.9 FL (ref 37–54)
EGFRCR SERPLBLD CKD-EPI 2021: 79.1 ML/MIN/1.73
ERYTHROCYTE [DISTWIDTH] IN BLOOD BY AUTOMATED COUNT: 12.5 % (ref 12.3–15.4)
ERYTHROCYTE [SEDIMENTATION RATE] IN BLOOD: 121 MM/HR (ref 0–30)
FLUAV RNA RESP QL NAA+PROBE: NOT DETECTED
FLUBV RNA RESP QL NAA+PROBE: NOT DETECTED
GLOBULIN UR ELPH-MCNC: 3.5 GM/DL
GLUCOSE SERPL-MCNC: 104 MG/DL (ref 65–99)
HCO3 BLDA-SCNC: 26.1 MMOL/L (ref 20–26)
HCT VFR BLD AUTO: 31.3 % (ref 34–46.6)
HCT VFR BLD CALC: 33.4 % (ref 38–51)
HGB BLD-MCNC: 11.1 G/DL (ref 12–15.9)
HGB BLDA-MCNC: 10.9 G/DL (ref 13.5–17.5)
HOLD SPECIMEN: NORMAL
HOLD SPECIMEN: NORMAL
INHALED O2 CONCENTRATION: 21 %
LYMPHOCYTES # BLD MANUAL: 1.89 10*3/MM3 (ref 0.7–3.1)
LYMPHOCYTES NFR BLD MANUAL: 17 % (ref 5–12)
Lab: ABNORMAL
Lab: ABNORMAL
MAGNESIUM SERPL-MCNC: 1.1 MG/DL (ref 1.6–2.4)
MCH RBC QN AUTO: 31.8 PG (ref 26.6–33)
MCHC RBC AUTO-ENTMCNC: 35.5 G/DL (ref 31.5–35.7)
MCV RBC AUTO: 89.7 FL (ref 79–97)
METHGB BLD QL: <-0.1 % (ref 0–3)
MODALITY: ABNORMAL
MONOCYTES # BLD: 2.01 10*3/MM3 (ref 0.1–0.9)
NEUTROPHILS # BLD AUTO: 7.92 10*3/MM3 (ref 1.7–7)
NEUTROPHILS NFR BLD MANUAL: 54 % (ref 42.7–76)
NEUTS BAND NFR BLD MANUAL: 13 % (ref 0–5)
NOTE: ABNORMAL
NOTIFIED BY: ABNORMAL
NOTIFIED WHO: ABNORMAL
NT-PROBNP SERPL-MCNC: 1106 PG/ML (ref 0–1800)
OXYHGB MFR BLDV: 90.3 % (ref 94–99)
PCO2 BLDA: 36.2 MM HG (ref 35–45)
PCO2 TEMP ADJ BLD: ABNORMAL MM[HG]
PH BLDA: 7.47 PH UNITS (ref 7.35–7.45)
PH, TEMP CORRECTED: ABNORMAL
PLATELET # BLD AUTO: 236 10*3/MM3 (ref 140–450)
PMV BLD AUTO: 9.4 FL (ref 6–12)
PO2 BLDA: 55 MM HG (ref 83–108)
PO2 TEMP ADJ BLD: ABNORMAL MM[HG]
POTASSIUM SERPL-SCNC: 3.9 MMOL/L (ref 3.5–5.2)
PROCALCITONIN SERPL-MCNC: 0.6 NG/ML (ref 0–0.25)
PROT SERPL-MCNC: 6.8 G/DL (ref 6–8.5)
RBC # BLD AUTO: 3.49 10*6/MM3 (ref 3.77–5.28)
RBC MORPH BLD: NORMAL
S PYO AG THROAT QL: NEGATIVE
SAO2 % BLDCOA: 90.6 % (ref 94–99)
SARS-COV-2 RNA RESP QL NAA+PROBE: NOT DETECTED
SCAN SLIDE: NORMAL
SMALL PLATELETS BLD QL SMEAR: ADEQUATE
SODIUM SERPL-SCNC: 115 MMOL/L (ref 136–145)
T4 FREE SERPL-MCNC: 1.08 NG/DL (ref 0.93–1.7)
TROPONIN T SERPL-MCNC: <0.01 NG/ML (ref 0–0.03)
TROPONIN T SERPL-MCNC: <0.01 NG/ML (ref 0–0.03)
TSH SERPL DL<=0.05 MIU/L-ACNC: 0.41 UIU/ML (ref 0.27–4.2)
VARIANT LYMPHS NFR BLD MANUAL: 16 % (ref 19.6–45.3)
VENTILATOR MODE: ABNORMAL
WBC NRBC COR # BLD: 11.82 10*3/MM3 (ref 3.4–10.8)
WHOLE BLOOD HOLD COAG: NORMAL
WHOLE BLOOD HOLD SPECIMEN: NORMAL

## 2022-05-13 PROCEDURE — 36600 WITHDRAWAL OF ARTERIAL BLOOD: CPT

## 2022-05-13 PROCEDURE — 83605 ASSAY OF LACTIC ACID: CPT | Performed by: EMERGENCY MEDICINE

## 2022-05-13 PROCEDURE — 84484 ASSAY OF TROPONIN QUANT: CPT | Performed by: EMERGENCY MEDICINE

## 2022-05-13 PROCEDURE — 84145 PROCALCITONIN (PCT): CPT | Performed by: EMERGENCY MEDICINE

## 2022-05-13 PROCEDURE — 87880 STREP A ASSAY W/OPTIC: CPT | Performed by: EMERGENCY MEDICINE

## 2022-05-13 PROCEDURE — 83735 ASSAY OF MAGNESIUM: CPT | Performed by: EMERGENCY MEDICINE

## 2022-05-13 PROCEDURE — 99285 EMERGENCY DEPT VISIT HI MDM: CPT

## 2022-05-13 PROCEDURE — 25010000002 CEFTRIAXONE PER 250 MG: Performed by: EMERGENCY MEDICINE

## 2022-05-13 PROCEDURE — 84439 ASSAY OF FREE THYROXINE: CPT | Performed by: EMERGENCY MEDICINE

## 2022-05-13 PROCEDURE — 93005 ELECTROCARDIOGRAM TRACING: CPT | Performed by: EMERGENCY MEDICINE

## 2022-05-13 PROCEDURE — 85007 BL SMEAR W/DIFF WBC COUNT: CPT | Performed by: EMERGENCY MEDICINE

## 2022-05-13 PROCEDURE — 82805 BLOOD GASES W/O2 SATURATION: CPT

## 2022-05-13 PROCEDURE — 83880 ASSAY OF NATRIURETIC PEPTIDE: CPT | Performed by: EMERGENCY MEDICINE

## 2022-05-13 PROCEDURE — 84443 ASSAY THYROID STIM HORMONE: CPT | Performed by: EMERGENCY MEDICINE

## 2022-05-13 PROCEDURE — P9612 CATHETERIZE FOR URINE SPEC: HCPCS

## 2022-05-13 PROCEDURE — 71045 X-RAY EXAM CHEST 1 VIEW: CPT

## 2022-05-13 PROCEDURE — 86140 C-REACTIVE PROTEIN: CPT | Performed by: EMERGENCY MEDICINE

## 2022-05-13 PROCEDURE — 87040 BLOOD CULTURE FOR BACTERIA: CPT | Performed by: EMERGENCY MEDICINE

## 2022-05-13 PROCEDURE — 85025 COMPLETE CBC W/AUTO DIFF WBC: CPT | Performed by: EMERGENCY MEDICINE

## 2022-05-13 PROCEDURE — 83050 HGB METHEMOGLOBIN QUAN: CPT

## 2022-05-13 PROCEDURE — 82550 ASSAY OF CK (CPK): CPT | Performed by: EMERGENCY MEDICINE

## 2022-05-13 PROCEDURE — 87636 SARSCOV2 & INF A&B AMP PRB: CPT | Performed by: EMERGENCY MEDICINE

## 2022-05-13 PROCEDURE — 85652 RBC SED RATE AUTOMATED: CPT | Performed by: EMERGENCY MEDICINE

## 2022-05-13 PROCEDURE — 82375 ASSAY CARBOXYHB QUANT: CPT

## 2022-05-13 PROCEDURE — 87081 CULTURE SCREEN ONLY: CPT | Performed by: EMERGENCY MEDICINE

## 2022-05-13 PROCEDURE — 80053 COMPREHEN METABOLIC PANEL: CPT | Performed by: EMERGENCY MEDICINE

## 2022-05-13 RX ORDER — ACETAMINOPHEN 500 MG
1000 TABLET ORAL ONCE
Status: DISCONTINUED | OUTPATIENT
Start: 2022-05-13 | End: 2022-05-13

## 2022-05-13 RX ORDER — SODIUM CHLORIDE 9 MG/ML
125 INJECTION, SOLUTION INTRAVENOUS CONTINUOUS
Status: DISCONTINUED | OUTPATIENT
Start: 2022-05-13 | End: 2022-05-14

## 2022-05-13 RX ORDER — ACETAMINOPHEN 500 MG
1000 TABLET ORAL ONCE
Status: COMPLETED | OUTPATIENT
Start: 2022-05-13 | End: 2022-05-13

## 2022-05-13 RX ADMIN — SODIUM CHLORIDE 125 ML/HR: 9 INJECTION, SOLUTION INTRAVENOUS at 22:29

## 2022-05-13 RX ADMIN — SODIUM CHLORIDE 500 ML: 9 INJECTION, SOLUTION INTRAVENOUS at 22:25

## 2022-05-13 RX ADMIN — DOXYCYCLINE 100 MG: 100 INJECTION, POWDER, LYOPHILIZED, FOR SOLUTION INTRAVENOUS at 23:23

## 2022-05-13 RX ADMIN — CEFTRIAXONE 2 G: 2 INJECTION, POWDER, FOR SOLUTION INTRAMUSCULAR; INTRAVENOUS at 22:28

## 2022-05-13 RX ADMIN — ACETAMINOPHEN 1000 MG: 500 TABLET ORAL at 22:12

## 2022-05-14 ENCOUNTER — APPOINTMENT (OUTPATIENT)
Dept: CT IMAGING | Facility: HOSPITAL | Age: 77
End: 2022-05-14

## 2022-05-14 PROBLEM — A41.9 SEPSIS WITH ACUTE RESPIRATORY FAILURE WITHOUT SEPTIC SHOCK (HCC): Status: ACTIVE | Noted: 2022-05-14

## 2022-05-14 PROBLEM — R74.8 ELEVATED CK: Status: ACTIVE | Noted: 2022-05-14

## 2022-05-14 PROBLEM — E87.1 HYPONATREMIA: Status: ACTIVE | Noted: 2022-05-14

## 2022-05-14 PROBLEM — J96.00 SEPSIS WITH ACUTE RESPIRATORY FAILURE WITHOUT SEPTIC SHOCK (HCC): Status: ACTIVE | Noted: 2022-05-14

## 2022-05-14 PROBLEM — R06.09 DOE (DYSPNEA ON EXERTION): Status: RESOLVED | Noted: 2019-05-08 | Resolved: 2022-05-14

## 2022-05-14 PROBLEM — J18.9 PNEUMONIA OF RIGHT LUNG DUE TO INFECTIOUS ORGANISM, UNSPECIFIED PART OF LUNG: Status: ACTIVE | Noted: 2022-05-14

## 2022-05-14 LAB
ALBUMIN SERPL-MCNC: 2.95 G/DL (ref 3.5–5.2)
ALBUMIN/GLOB SERPL: 0.9 G/DL
ALP SERPL-CCNC: 114 U/L (ref 39–117)
ALT SERPL W P-5'-P-CCNC: 21 U/L (ref 1–33)
ANION GAP SERPL CALCULATED.3IONS-SCNC: 10.9 MMOL/L (ref 5–15)
ANION GAP SERPL CALCULATED.3IONS-SCNC: 13 MMOL/L (ref 5–15)
ANION GAP SERPL CALCULATED.3IONS-SCNC: 13.2 MMOL/L (ref 5–15)
ANION GAP SERPL CALCULATED.3IONS-SCNC: 13.7 MMOL/L (ref 5–15)
ANION GAP SERPL CALCULATED.3IONS-SCNC: 14.9 MMOL/L (ref 5–15)
ANION GAP SERPL CALCULATED.3IONS-SCNC: 9.7 MMOL/L (ref 5–15)
AST SERPL-CCNC: 33 U/L (ref 1–32)
B PARAPERT DNA SPEC QL NAA+PROBE: NOT DETECTED
B PERT DNA SPEC QL NAA+PROBE: NOT DETECTED
BACTERIA UR QL AUTO: NORMAL /HPF
BASOPHILS # BLD AUTO: 0.04 10*3/MM3 (ref 0–0.2)
BASOPHILS NFR BLD AUTO: 0.3 % (ref 0–1.5)
BILIRUB SERPL-MCNC: 0.4 MG/DL (ref 0–1.2)
BILIRUB UR QL STRIP: NEGATIVE
BUN SERPL-MCNC: 6 MG/DL (ref 8–23)
BUN SERPL-MCNC: 7 MG/DL (ref 8–23)
BUN SERPL-MCNC: 8 MG/DL (ref 8–23)
BUN/CREAT SERPL: 11.1 (ref 7–25)
BUN/CREAT SERPL: 11.7 (ref 7–25)
BUN/CREAT SERPL: 9.2 (ref 7–25)
BUN/CREAT SERPL: 9.5 (ref 7–25)
BUN/CREAT SERPL: 9.6 (ref 7–25)
BUN/CREAT SERPL: 9.7 (ref 7–25)
C PNEUM DNA NPH QL NAA+NON-PROBE: NOT DETECTED
CALCIUM SPEC-SCNC: 8.5 MG/DL (ref 8.6–10.5)
CALCIUM SPEC-SCNC: 8.6 MG/DL (ref 8.6–10.5)
CALCIUM SPEC-SCNC: 9 MG/DL (ref 8.6–10.5)
CALCIUM SPEC-SCNC: 9.1 MG/DL (ref 8.6–10.5)
CHLORIDE SERPL-SCNC: 82 MMOL/L (ref 98–107)
CHLORIDE SERPL-SCNC: 84 MMOL/L (ref 98–107)
CHLORIDE SERPL-SCNC: 84 MMOL/L (ref 98–107)
CHLORIDE SERPL-SCNC: 85 MMOL/L (ref 98–107)
CHLORIDE SERPL-SCNC: 87 MMOL/L (ref 98–107)
CHLORIDE SERPL-SCNC: 88 MMOL/L (ref 98–107)
CHLORIDE UR-SCNC: <20 MMOL/L
CK SERPL-CCNC: 340 U/L (ref 20–180)
CK SERPL-CCNC: 400 U/L (ref 20–180)
CLARITY UR: CLEAR
CO2 SERPL-SCNC: 20.1 MMOL/L (ref 22–29)
CO2 SERPL-SCNC: 21.8 MMOL/L (ref 22–29)
CO2 SERPL-SCNC: 22 MMOL/L (ref 22–29)
CO2 SERPL-SCNC: 22.1 MMOL/L (ref 22–29)
CO2 SERPL-SCNC: 22.3 MMOL/L (ref 22–29)
CO2 SERPL-SCNC: 23.3 MMOL/L (ref 22–29)
COLOR UR: YELLOW
CORTIS AM PEAK SERPL-MCNC: 16.78 MCG/DL
CREAT SERPL-MCNC: 0.6 MG/DL (ref 0.57–1)
CREAT SERPL-MCNC: 0.63 MG/DL (ref 0.57–1)
CREAT SERPL-MCNC: 0.72 MG/DL (ref 0.57–1)
CREAT SERPL-MCNC: 0.72 MG/DL (ref 0.57–1)
CREAT SERPL-MCNC: 0.73 MG/DL (ref 0.57–1)
CREAT SERPL-MCNC: 0.76 MG/DL (ref 0.57–1)
CRP SERPL-MCNC: 41.82 MG/DL (ref 0–0.5)
DEPRECATED RDW RBC AUTO: 41 FL (ref 37–54)
EGFRCR SERPLBLD CKD-EPI 2021: 80.3 ML/MIN/1.73
EGFRCR SERPLBLD CKD-EPI 2021: 84.3 ML/MIN/1.73
EGFRCR SERPLBLD CKD-EPI 2021: 85.7 ML/MIN/1.73
EGFRCR SERPLBLD CKD-EPI 2021: 85.7 ML/MIN/1.73
EGFRCR SERPLBLD CKD-EPI 2021: 90.9 ML/MIN/1.73
EGFRCR SERPLBLD CKD-EPI 2021: 92 ML/MIN/1.73
EOSINOPHIL # BLD AUTO: 0.02 10*3/MM3 (ref 0–0.4)
EOSINOPHIL NFR BLD AUTO: 0.2 % (ref 0.3–6.2)
ERYTHROCYTE [DISTWIDTH] IN BLOOD BY AUTOMATED COUNT: 12.4 % (ref 12.3–15.4)
FLUAV SUBTYP SPEC NAA+PROBE: NOT DETECTED
FLUBV RNA ISLT QL NAA+PROBE: NOT DETECTED
FOLATE SERPL-MCNC: >20 NG/ML (ref 4.78–24.2)
GLOBULIN UR ELPH-MCNC: 3.4 GM/DL
GLUCOSE SERPL-MCNC: 103 MG/DL (ref 65–99)
GLUCOSE SERPL-MCNC: 104 MG/DL (ref 65–99)
GLUCOSE SERPL-MCNC: 128 MG/DL (ref 65–99)
GLUCOSE SERPL-MCNC: 152 MG/DL (ref 65–99)
GLUCOSE SERPL-MCNC: 208 MG/DL (ref 65–99)
GLUCOSE SERPL-MCNC: 99 MG/DL (ref 65–99)
GLUCOSE UR STRIP-MCNC: NEGATIVE MG/DL
HADV DNA SPEC NAA+PROBE: NOT DETECTED
HCOV 229E RNA SPEC QL NAA+PROBE: NOT DETECTED
HCOV HKU1 RNA SPEC QL NAA+PROBE: NOT DETECTED
HCOV NL63 RNA SPEC QL NAA+PROBE: NOT DETECTED
HCOV OC43 RNA SPEC QL NAA+PROBE: NOT DETECTED
HCT VFR BLD AUTO: 30 % (ref 34–46.6)
HGB BLD-MCNC: 10.5 G/DL (ref 12–15.9)
HGB UR QL STRIP.AUTO: ABNORMAL
HMPV RNA NPH QL NAA+NON-PROBE: NOT DETECTED
HPIV1 RNA ISLT QL NAA+PROBE: NOT DETECTED
HPIV2 RNA SPEC QL NAA+PROBE: NOT DETECTED
HPIV3 RNA NPH QL NAA+PROBE: NOT DETECTED
HPIV4 P GENE NPH QL NAA+PROBE: NOT DETECTED
HYALINE CASTS UR QL AUTO: NORMAL /LPF
IMM GRANULOCYTES # BLD AUTO: 0.05 10*3/MM3 (ref 0–0.05)
IMM GRANULOCYTES NFR BLD AUTO: 0.4 % (ref 0–0.5)
IRON 24H UR-MRATE: 14 MCG/DL (ref 37–145)
KETONES UR QL STRIP: ABNORMAL
L PNEUMO1 AG UR QL IA: NEGATIVE
LEUKOCYTE ESTERASE UR QL STRIP.AUTO: NEGATIVE
LYMPHOCYTES # BLD AUTO: 1.23 10*3/MM3 (ref 0.7–3.1)
LYMPHOCYTES NFR BLD AUTO: 10.1 % (ref 19.6–45.3)
M PNEUMO IGG SER IA-ACNC: NOT DETECTED
MAGNESIUM SERPL-MCNC: 1.2 MG/DL (ref 1.6–2.4)
MAGNESIUM SERPL-MCNC: 2.6 MG/DL (ref 1.6–2.4)
MCH RBC QN AUTO: 31.3 PG (ref 26.6–33)
MCHC RBC AUTO-ENTMCNC: 35 G/DL (ref 31.5–35.7)
MCV RBC AUTO: 89.6 FL (ref 79–97)
MONOCYTES # BLD AUTO: 2.25 10*3/MM3 (ref 0.1–0.9)
MONOCYTES NFR BLD AUTO: 18.5 % (ref 5–12)
MRSA DNA SPEC QL NAA+PROBE: NEGATIVE
NEUTROPHILS NFR BLD AUTO: 70.5 % (ref 42.7–76)
NEUTROPHILS NFR BLD AUTO: 8.6 10*3/MM3 (ref 1.7–7)
NITRITE UR QL STRIP: NEGATIVE
NRBC BLD AUTO-RTO: 0 /100 WBC (ref 0–0.2)
PH UR STRIP.AUTO: 6.5 [PH] (ref 5–8)
PLAT MORPH BLD: NORMAL
PLATELET # BLD AUTO: 211 10*3/MM3 (ref 140–450)
PMV BLD AUTO: 9.5 FL (ref 6–12)
POTASSIUM SERPL-SCNC: 3.8 MMOL/L (ref 3.5–5.2)
POTASSIUM SERPL-SCNC: 3.9 MMOL/L (ref 3.5–5.2)
POTASSIUM SERPL-SCNC: 4 MMOL/L (ref 3.5–5.2)
POTASSIUM SERPL-SCNC: 4.1 MMOL/L (ref 3.5–5.2)
POTASSIUM UR-SCNC: 10.3 MMOL/L
PROT SERPL-MCNC: 6.3 G/DL (ref 6–8.5)
PROT UR QL STRIP: ABNORMAL
QT INTERVAL: 366 MS
QTC INTERVAL: 435 MS
RBC # BLD AUTO: 3.35 10*6/MM3 (ref 3.77–5.28)
RBC # UR STRIP: NORMAL /HPF
RBC MORPH BLD: NORMAL
REF LAB TEST METHOD: NORMAL
RHINOVIRUS RNA SPEC NAA+PROBE: NOT DETECTED
RSV RNA NPH QL NAA+NON-PROBE: NOT DETECTED
S AUREUS DNA SPEC QL NAA+PROBE: NEGATIVE
S PNEUM AG SPEC QL LA: NEGATIVE
SARS-COV-2 RNA NPH QL NAA+NON-PROBE: NOT DETECTED
SODIUM SERPL-SCNC: 117 MMOL/L (ref 136–145)
SODIUM SERPL-SCNC: 118 MMOL/L (ref 136–145)
SODIUM SERPL-SCNC: 119 MMOL/L (ref 136–145)
SODIUM SERPL-SCNC: 120 MMOL/L (ref 136–145)
SODIUM SERPL-SCNC: 121 MMOL/L (ref 136–145)
SODIUM SERPL-SCNC: 122 MMOL/L (ref 136–145)
SODIUM SERPL-SCNC: 122 MMOL/L (ref 136–145)
SODIUM UR-SCNC: <20 MMOL/L
SP GR UR STRIP: 1.01 (ref 1–1.03)
SQUAMOUS #/AREA URNS HPF: NORMAL /HPF
UROBILINOGEN UR QL STRIP: ABNORMAL
VIT B12 BLD-MCNC: >2000 PG/ML (ref 211–946)
WBC # UR STRIP: NORMAL /HPF
WBC NRBC COR # BLD: 12.19 10*3/MM3 (ref 3.4–10.8)

## 2022-05-14 PROCEDURE — 94640 AIRWAY INHALATION TREATMENT: CPT

## 2022-05-14 PROCEDURE — 82436 ASSAY OF URINE CHLORIDE: CPT | Performed by: INTERNAL MEDICINE

## 2022-05-14 PROCEDURE — 71250 CT THORAX DX C-: CPT

## 2022-05-14 PROCEDURE — 83735 ASSAY OF MAGNESIUM: CPT

## 2022-05-14 PROCEDURE — 85025 COMPLETE CBC W/AUTO DIFF WBC: CPT | Performed by: HOSPITALIST

## 2022-05-14 PROCEDURE — 99223 1ST HOSP IP/OBS HIGH 75: CPT | Performed by: HOSPITALIST

## 2022-05-14 PROCEDURE — 0202U NFCT DS 22 TRGT SARS-COV-2: CPT | Performed by: HOSPITALIST

## 2022-05-14 PROCEDURE — 84133 ASSAY OF URINE POTASSIUM: CPT | Performed by: INTERNAL MEDICINE

## 2022-05-14 PROCEDURE — 84295 ASSAY OF SERUM SODIUM: CPT | Performed by: INTERNAL MEDICINE

## 2022-05-14 PROCEDURE — 85007 BL SMEAR W/DIFF WBC COUNT: CPT | Performed by: HOSPITALIST

## 2022-05-14 PROCEDURE — 87641 MR-STAPH DNA AMP PROBE: CPT | Performed by: INTERNAL MEDICINE

## 2022-05-14 PROCEDURE — 25010000002 HEPARIN (PORCINE) PER 1000 UNITS: Performed by: HOSPITALIST

## 2022-05-14 PROCEDURE — 83735 ASSAY OF MAGNESIUM: CPT | Performed by: INTERNAL MEDICINE

## 2022-05-14 PROCEDURE — 87640 STAPH A DNA AMP PROBE: CPT | Performed by: INTERNAL MEDICINE

## 2022-05-14 PROCEDURE — 82533 TOTAL CORTISOL: CPT | Performed by: INTERNAL MEDICINE

## 2022-05-14 PROCEDURE — 94799 UNLISTED PULMONARY SVC/PX: CPT

## 2022-05-14 PROCEDURE — 82746 ASSAY OF FOLIC ACID SERUM: CPT

## 2022-05-14 PROCEDURE — 82550 ASSAY OF CK (CPK): CPT | Performed by: INTERNAL MEDICINE

## 2022-05-14 PROCEDURE — 80053 COMPREHEN METABOLIC PANEL: CPT | Performed by: HOSPITALIST

## 2022-05-14 PROCEDURE — 84300 ASSAY OF URINE SODIUM: CPT | Performed by: INTERNAL MEDICINE

## 2022-05-14 PROCEDURE — 82607 VITAMIN B-12: CPT

## 2022-05-14 PROCEDURE — 25010000002 CEFTRIAXONE PER 250 MG: Performed by: INTERNAL MEDICINE

## 2022-05-14 PROCEDURE — 25010000002 MAGNESIUM SULFATE 2 GM/50ML SOLUTION

## 2022-05-14 PROCEDURE — 81001 URINALYSIS AUTO W/SCOPE: CPT | Performed by: EMERGENCY MEDICINE

## 2022-05-14 PROCEDURE — 25010000002 METHYLPREDNISOLONE PER 40 MG: Performed by: INTERNAL MEDICINE

## 2022-05-14 PROCEDURE — 83540 ASSAY OF IRON: CPT

## 2022-05-14 PROCEDURE — 82550 ASSAY OF CK (CPK): CPT

## 2022-05-14 PROCEDURE — 86140 C-REACTIVE PROTEIN: CPT | Performed by: HOSPITALIST

## 2022-05-14 PROCEDURE — 94761 N-INVAS EAR/PLS OXIMETRY MLT: CPT

## 2022-05-14 RX ORDER — BENZONATATE 100 MG/1
200 CAPSULE ORAL 3 TIMES DAILY PRN
Status: DISCONTINUED | OUTPATIENT
Start: 2022-05-14 | End: 2022-05-19 | Stop reason: HOSPADM

## 2022-05-14 RX ORDER — GUAIFENESIN/DEXTROMETHORPHAN 100-10MG/5
5 SYRUP ORAL EVERY 4 HOURS PRN
Status: DISCONTINUED | OUTPATIENT
Start: 2022-05-14 | End: 2022-05-19 | Stop reason: HOSPADM

## 2022-05-14 RX ORDER — ACETAMINOPHEN 325 MG/1
650 TABLET ORAL EVERY 6 HOURS PRN
Status: DISCONTINUED | OUTPATIENT
Start: 2022-05-14 | End: 2022-05-19 | Stop reason: HOSPADM

## 2022-05-14 RX ORDER — NITROGLYCERIN 0.4 MG/1
0.4 TABLET SUBLINGUAL
Status: DISCONTINUED | OUTPATIENT
Start: 2022-05-14 | End: 2022-05-19 | Stop reason: HOSPADM

## 2022-05-14 RX ORDER — SODIUM CHLORIDE 9 MG/ML
75 INJECTION, SOLUTION INTRAVENOUS CONTINUOUS
Status: DISCONTINUED | OUTPATIENT
Start: 2022-05-14 | End: 2022-05-16

## 2022-05-14 RX ORDER — ASPIRIN 81 MG/1
81 TABLET ORAL DAILY
Status: DISCONTINUED | OUTPATIENT
Start: 2022-05-14 | End: 2022-05-19 | Stop reason: HOSPADM

## 2022-05-14 RX ORDER — MECLIZINE HCL 12.5 MG/1
12.5 TABLET ORAL 3 TIMES DAILY PRN
COMMUNITY

## 2022-05-14 RX ORDER — SODIUM CHLORIDE 0.9 % (FLUSH) 0.9 %
10 SYRINGE (ML) INJECTION AS NEEDED
Status: DISCONTINUED | OUTPATIENT
Start: 2022-05-14 | End: 2022-05-19 | Stop reason: HOSPADM

## 2022-05-14 RX ORDER — IPRATROPIUM BROMIDE AND ALBUTEROL SULFATE 2.5; .5 MG/3ML; MG/3ML
3 SOLUTION RESPIRATORY (INHALATION)
Status: DISCONTINUED | OUTPATIENT
Start: 2022-05-14 | End: 2022-05-19 | Stop reason: HOSPADM

## 2022-05-14 RX ORDER — ATORVASTATIN CALCIUM 20 MG/1
20 TABLET, FILM COATED ORAL DAILY
Status: CANCELLED | OUTPATIENT
Start: 2022-05-14

## 2022-05-14 RX ORDER — DIPHENOXYLATE HYDROCHLORIDE AND ATROPINE SULFATE 2.5; .025 MG/1; MG/1
1 TABLET ORAL DAILY
COMMUNITY

## 2022-05-14 RX ORDER — HYDROCHLOROTHIAZIDE 25 MG/1
25 TABLET ORAL DAILY
Status: CANCELLED | OUTPATIENT
Start: 2022-05-14

## 2022-05-14 RX ORDER — SODIUM CHLORIDE 9 MG/ML
75 INJECTION, SOLUTION INTRAVENOUS CONTINUOUS
Status: DISCONTINUED | OUTPATIENT
Start: 2022-05-14 | End: 2022-05-14

## 2022-05-14 RX ORDER — DIPHENOXYLATE HYDROCHLORIDE AND ATROPINE SULFATE 2.5; .025 MG/1; MG/1
1 TABLET ORAL DAILY
Status: DISCONTINUED | OUTPATIENT
Start: 2022-05-14 | End: 2022-05-19 | Stop reason: HOSPADM

## 2022-05-14 RX ORDER — METHYLPREDNISOLONE SODIUM SUCCINATE 40 MG/ML
40 INJECTION, POWDER, LYOPHILIZED, FOR SOLUTION INTRAMUSCULAR; INTRAVENOUS EVERY 12 HOURS
Status: DISCONTINUED | OUTPATIENT
Start: 2022-05-14 | End: 2022-05-17

## 2022-05-14 RX ORDER — LISINOPRIL 10 MG/1
5 TABLET ORAL DAILY
Status: CANCELLED | OUTPATIENT
Start: 2022-05-14

## 2022-05-14 RX ORDER — BUDESONIDE AND FORMOTEROL FUMARATE DIHYDRATE 160; 4.5 UG/1; UG/1
2 AEROSOL RESPIRATORY (INHALATION)
Status: DISCONTINUED | OUTPATIENT
Start: 2022-05-14 | End: 2022-05-19 | Stop reason: HOSPADM

## 2022-05-14 RX ORDER — MECLIZINE HCL 12.5 MG/1
12.5 TABLET ORAL 3 TIMES DAILY PRN
Status: DISCONTINUED | OUTPATIENT
Start: 2022-05-14 | End: 2022-05-19 | Stop reason: HOSPADM

## 2022-05-14 RX ORDER — SODIUM CHLORIDE 0.9 % (FLUSH) 0.9 %
10 SYRINGE (ML) INJECTION EVERY 12 HOURS SCHEDULED
Status: DISCONTINUED | OUTPATIENT
Start: 2022-05-14 | End: 2022-05-19 | Stop reason: HOSPADM

## 2022-05-14 RX ORDER — MAGNESIUM SULFATE HEPTAHYDRATE 40 MG/ML
2 INJECTION, SOLUTION INTRAVENOUS
Status: COMPLETED | OUTPATIENT
Start: 2022-05-14 | End: 2022-05-14

## 2022-05-14 RX ORDER — GUAIFENESIN 600 MG/1
1200 TABLET, EXTENDED RELEASE ORAL EVERY 12 HOURS SCHEDULED
Status: DISCONTINUED | OUTPATIENT
Start: 2022-05-14 | End: 2022-05-19 | Stop reason: HOSPADM

## 2022-05-14 RX ORDER — MAGNESIUM SULFATE HEPTAHYDRATE 40 MG/ML
2 INJECTION, SOLUTION INTRAVENOUS AS NEEDED
Status: DISCONTINUED | OUTPATIENT
Start: 2022-05-14 | End: 2022-05-19 | Stop reason: HOSPADM

## 2022-05-14 RX ORDER — MAGNESIUM SULFATE HEPTAHYDRATE 40 MG/ML
4 INJECTION, SOLUTION INTRAVENOUS AS NEEDED
Status: DISCONTINUED | OUTPATIENT
Start: 2022-05-14 | End: 2022-05-19 | Stop reason: HOSPADM

## 2022-05-14 RX ORDER — FAMOTIDINE 20 MG/1
20 TABLET, FILM COATED ORAL 2 TIMES DAILY
Status: DISCONTINUED | OUTPATIENT
Start: 2022-05-14 | End: 2022-05-19 | Stop reason: HOSPADM

## 2022-05-14 RX ORDER — CLOPIDOGREL BISULFATE 75 MG/1
75 TABLET ORAL DAILY
Status: DISCONTINUED | OUTPATIENT
Start: 2022-05-14 | End: 2022-05-19 | Stop reason: HOSPADM

## 2022-05-14 RX ORDER — BENZONATATE 100 MG/1
100 CAPSULE ORAL 3 TIMES DAILY
Status: DISCONTINUED | OUTPATIENT
Start: 2022-05-14 | End: 2022-05-19 | Stop reason: HOSPADM

## 2022-05-14 RX ORDER — FOLIC ACID 1 MG/1
1 TABLET ORAL DAILY
Status: DISCONTINUED | OUTPATIENT
Start: 2022-05-14 | End: 2022-05-19 | Stop reason: HOSPADM

## 2022-05-14 RX ORDER — CYANOCOBALAMIN 1000 UG/ML
1000 INJECTION, SOLUTION INTRAMUSCULAR; SUBCUTANEOUS
Status: DISCONTINUED | OUTPATIENT
Start: 2022-05-15 | End: 2022-05-19 | Stop reason: HOSPADM

## 2022-05-14 RX ORDER — HEPARIN SODIUM 5000 [USP'U]/ML
5000 INJECTION, SOLUTION INTRAVENOUS; SUBCUTANEOUS EVERY 12 HOURS SCHEDULED
Status: DISCONTINUED | OUTPATIENT
Start: 2022-05-14 | End: 2022-05-17

## 2022-05-14 RX ADMIN — FAMOTIDINE 20 MG: 20 TABLET, FILM COATED ORAL at 21:17

## 2022-05-14 RX ADMIN — MAGNESIUM SULFATE HEPTAHYDRATE 2 G: 40 INJECTION, SOLUTION INTRAVENOUS at 14:10

## 2022-05-14 RX ADMIN — GUAIFENESIN 1200 MG: 600 TABLET, EXTENDED RELEASE ORAL at 12:08

## 2022-05-14 RX ADMIN — DOXYCYCLINE 100 MG: 100 INJECTION, POWDER, LYOPHILIZED, FOR SOLUTION INTRAVENOUS at 23:28

## 2022-05-14 RX ADMIN — BENZONATATE 100 MG: 100 CAPSULE, LIQUID FILLED ORAL at 21:16

## 2022-05-14 RX ADMIN — Medication 10 ML: at 21:18

## 2022-05-14 RX ADMIN — MAGNESIUM SULFATE HEPTAHYDRATE 2 G: 40 INJECTION, SOLUTION INTRAVENOUS at 09:08

## 2022-05-14 RX ADMIN — ASPIRIN 81 MG: 81 TABLET, COATED ORAL at 09:07

## 2022-05-14 RX ADMIN — FAMOTIDINE 20 MG: 20 TABLET, FILM COATED ORAL at 09:08

## 2022-05-14 RX ADMIN — GUAIFENESIN AND DEXTROMETHORPHAN 5 ML: 100; 10 SYRUP ORAL at 04:28

## 2022-05-14 RX ADMIN — CEFTRIAXONE 2 G: 2 INJECTION, POWDER, FOR SOLUTION INTRAMUSCULAR; INTRAVENOUS at 22:14

## 2022-05-14 RX ADMIN — MAGNESIUM SULFATE HEPTAHYDRATE 2 G: 40 INJECTION, SOLUTION INTRAVENOUS at 12:07

## 2022-05-14 RX ADMIN — METHYLPREDNISOLONE SODIUM SUCCINATE 40 MG: 40 INJECTION, POWDER, FOR SOLUTION INTRAMUSCULAR; INTRAVENOUS at 12:08

## 2022-05-14 RX ADMIN — IPRATROPIUM BROMIDE AND ALBUTEROL SULFATE 3 ML: .5; 3 SOLUTION RESPIRATORY (INHALATION) at 12:30

## 2022-05-14 RX ADMIN — IPRATROPIUM BROMIDE AND ALBUTEROL SULFATE 3 ML: .5; 3 SOLUTION RESPIRATORY (INHALATION) at 18:12

## 2022-05-14 RX ADMIN — BENZONATATE 100 MG: 100 CAPSULE, LIQUID FILLED ORAL at 15:37

## 2022-05-14 RX ADMIN — Medication 1 TABLET: at 09:07

## 2022-05-14 RX ADMIN — BUDESONIDE AND FORMOTEROL FUMARATE DIHYDRATE 2 PUFF: 160; 4.5 AEROSOL RESPIRATORY (INHALATION) at 18:12

## 2022-05-14 RX ADMIN — HEPARIN SODIUM 5000 UNITS: 5000 INJECTION INTRAVENOUS; SUBCUTANEOUS at 09:07

## 2022-05-14 RX ADMIN — GUAIFENESIN 1200 MG: 600 TABLET, EXTENDED RELEASE ORAL at 21:17

## 2022-05-14 RX ADMIN — DOXYCYCLINE 100 MG: 100 INJECTION, POWDER, LYOPHILIZED, FOR SOLUTION INTRAVENOUS at 12:08

## 2022-05-14 RX ADMIN — FOLIC ACID 1 MG: 1 TABLET ORAL at 09:08

## 2022-05-14 RX ADMIN — BUDESONIDE AND FORMOTEROL FUMARATE DIHYDRATE 2 PUFF: 160; 4.5 AEROSOL RESPIRATORY (INHALATION) at 12:30

## 2022-05-14 RX ADMIN — SODIUM CHLORIDE 75 ML/HR: 9 INJECTION, SOLUTION INTRAVENOUS at 02:45

## 2022-05-14 RX ADMIN — HEPARIN SODIUM 5000 UNITS: 5000 INJECTION INTRAVENOUS; SUBCUTANEOUS at 21:17

## 2022-05-14 RX ADMIN — SODIUM CHLORIDE 100 ML/HR: 9 INJECTION, SOLUTION INTRAVENOUS at 12:49

## 2022-05-14 RX ADMIN — CLOPIDOGREL BISULFATE 75 MG: 75 TABLET, FILM COATED ORAL at 09:08

## 2022-05-14 RX ADMIN — Medication 10 ML: at 09:09

## 2022-05-14 RX ADMIN — GUAIFENESIN AND DEXTROMETHORPHAN 5 ML: 100; 10 SYRUP ORAL at 21:16

## 2022-05-15 ENCOUNTER — APPOINTMENT (OUTPATIENT)
Dept: CT IMAGING | Facility: HOSPITAL | Age: 77
End: 2022-05-15

## 2022-05-15 ENCOUNTER — APPOINTMENT (OUTPATIENT)
Dept: GENERAL RADIOLOGY | Facility: HOSPITAL | Age: 77
End: 2022-05-15

## 2022-05-15 LAB
A-A DO2: 47.4 MMHG (ref 0–300)
ALBUMIN SERPL-MCNC: 2.63 G/DL (ref 3.5–5.2)
ALBUMIN/GLOB SERPL: 0.8 G/DL
ALP SERPL-CCNC: 110 U/L (ref 39–117)
ALT SERPL W P-5'-P-CCNC: 23 U/L (ref 1–33)
ANION GAP SERPL CALCULATED.3IONS-SCNC: 10.7 MMOL/L (ref 5–15)
ANION GAP SERPL CALCULATED.3IONS-SCNC: 11.1 MMOL/L (ref 5–15)
ANION GAP SERPL CALCULATED.3IONS-SCNC: 12.2 MMOL/L (ref 5–15)
ANION GAP SERPL CALCULATED.3IONS-SCNC: 12.3 MMOL/L (ref 5–15)
ANION GAP SERPL CALCULATED.3IONS-SCNC: 12.5 MMOL/L (ref 5–15)
ANION GAP SERPL CALCULATED.3IONS-SCNC: 9.8 MMOL/L (ref 5–15)
ARTERIAL PATENCY WRIST A: ABNORMAL
AST SERPL-CCNC: 31 U/L (ref 1–32)
ATMOSPHERIC PRESS: 726 MMHG
BACTERIA SPEC AEROBE CULT: NORMAL
BASE EXCESS BLDA CALC-SCNC: 1.4 MMOL/L (ref 0–2)
BDY SITE: ABNORMAL
BILIRUB SERPL-MCNC: 0.2 MG/DL (ref 0–1.2)
BODY TEMPERATURE: 0 C
BUN SERPL-MCNC: 10 MG/DL (ref 8–23)
BUN SERPL-MCNC: 11 MG/DL (ref 8–23)
BUN/CREAT SERPL: 15.1 (ref 7–25)
BUN/CREAT SERPL: 15.2 (ref 7–25)
BUN/CREAT SERPL: 15.3 (ref 7–25)
BUN/CREAT SERPL: 15.6 (ref 7–25)
BUN/CREAT SERPL: 15.7 (ref 7–25)
BUN/CREAT SERPL: 16.7 (ref 7–25)
CALCIUM SPEC-SCNC: 8.2 MG/DL (ref 8.6–10.5)
CALCIUM SPEC-SCNC: 8.2 MG/DL (ref 8.6–10.5)
CALCIUM SPEC-SCNC: 8.3 MG/DL (ref 8.6–10.5)
CALCIUM SPEC-SCNC: 8.3 MG/DL (ref 8.6–10.5)
CALCIUM SPEC-SCNC: 8.6 MG/DL (ref 8.6–10.5)
CALCIUM SPEC-SCNC: 8.7 MG/DL (ref 8.6–10.5)
CHLORIDE SERPL-SCNC: 88 MMOL/L (ref 98–107)
CHLORIDE SERPL-SCNC: 88 MMOL/L (ref 98–107)
CHLORIDE SERPL-SCNC: 89 MMOL/L (ref 98–107)
CHLORIDE SERPL-SCNC: 89 MMOL/L (ref 98–107)
CHLORIDE SERPL-SCNC: 91 MMOL/L (ref 98–107)
CHLORIDE SERPL-SCNC: 94 MMOL/L (ref 98–107)
CO2 BLDA-SCNC: 26.5 MMOL/L (ref 22–33)
CO2 SERPL-SCNC: 20.7 MMOL/L (ref 22–29)
CO2 SERPL-SCNC: 20.8 MMOL/L (ref 22–29)
CO2 SERPL-SCNC: 21.3 MMOL/L (ref 22–29)
CO2 SERPL-SCNC: 22.2 MMOL/L (ref 22–29)
CO2 SERPL-SCNC: 22.5 MMOL/L (ref 22–29)
CO2 SERPL-SCNC: 22.9 MMOL/L (ref 22–29)
COHGB MFR BLD: 0.9 % (ref 0–5)
CREAT SERPL-MCNC: 0.6 MG/DL (ref 0.57–1)
CREAT SERPL-MCNC: 0.64 MG/DL (ref 0.57–1)
CREAT SERPL-MCNC: 0.66 MG/DL (ref 0.57–1)
CREAT SERPL-MCNC: 0.7 MG/DL (ref 0.57–1)
CREAT SERPL-MCNC: 0.72 MG/DL (ref 0.57–1)
CREAT SERPL-MCNC: 0.73 MG/DL (ref 0.57–1)
DEPRECATED RDW RBC AUTO: 42.1 FL (ref 37–54)
EGFRCR SERPLBLD CKD-EPI 2021: 84.3 ML/MIN/1.73
EGFRCR SERPLBLD CKD-EPI 2021: 85.7 ML/MIN/1.73
EGFRCR SERPLBLD CKD-EPI 2021: 88.7 ML/MIN/1.73
EGFRCR SERPLBLD CKD-EPI 2021: 89.9 ML/MIN/1.73
EGFRCR SERPLBLD CKD-EPI 2021: 90.6 ML/MIN/1.73
EGFRCR SERPLBLD CKD-EPI 2021: 92 ML/MIN/1.73
ERYTHROCYTE [DISTWIDTH] IN BLOOD BY AUTOMATED COUNT: 12.7 % (ref 12.3–15.4)
GLOBULIN UR ELPH-MCNC: 3.5 GM/DL
GLUCOSE SERPL-MCNC: 131 MG/DL (ref 65–99)
GLUCOSE SERPL-MCNC: 142 MG/DL (ref 65–99)
GLUCOSE SERPL-MCNC: 148 MG/DL (ref 65–99)
GLUCOSE SERPL-MCNC: 156 MG/DL (ref 65–99)
GLUCOSE SERPL-MCNC: 177 MG/DL (ref 65–99)
GLUCOSE SERPL-MCNC: 230 MG/DL (ref 65–99)
HCO3 BLDA-SCNC: 25.4 MMOL/L (ref 20–26)
HCT VFR BLD AUTO: 28.7 % (ref 34–46.6)
HCT VFR BLD CALC: 33.2 % (ref 38–51)
HGB BLD-MCNC: 10 G/DL (ref 12–15.9)
HGB BLDA-MCNC: 10.8 G/DL (ref 13.5–17.5)
INHALED O2 CONCENTRATION: 21 %
LYMPHOCYTES # BLD MANUAL: 0.61 10*3/MM3 (ref 0.7–3.1)
LYMPHOCYTES NFR BLD MANUAL: 5 % (ref 5–12)
Lab: ABNORMAL
Lab: ABNORMAL
MAGNESIUM SERPL-MCNC: 2.5 MG/DL (ref 1.6–2.4)
MCH RBC QN AUTO: 31.4 PG (ref 26.6–33)
MCHC RBC AUTO-ENTMCNC: 34.8 G/DL (ref 31.5–35.7)
MCV RBC AUTO: 90.3 FL (ref 79–97)
METHGB BLD QL: <-0.1 % (ref 0–3)
MODALITY: ABNORMAL
MONOCYTES # BLD: 0.51 10*3/MM3 (ref 0.1–0.9)
NEUTROPHILS # BLD AUTO: 9.02 10*3/MM3 (ref 1.7–7)
NEUTROPHILS NFR BLD MANUAL: 86 % (ref 42.7–76)
NEUTS BAND NFR BLD MANUAL: 3 % (ref 0–5)
NOTE: ABNORMAL
NOTIFIED BY: ABNORMAL
NOTIFIED WHO: ABNORMAL
OXYHGB MFR BLDV: 89 % (ref 94–99)
PCO2 BLDA: 36.9 MM HG (ref 35–45)
PCO2 TEMP ADJ BLD: ABNORMAL MM[HG]
PH BLDA: 7.45 PH UNITS (ref 7.35–7.45)
PH, TEMP CORRECTED: ABNORMAL
PHOSPHATE SERPL-MCNC: 1.4 MG/DL (ref 2.5–4.5)
PHOSPHATE SERPL-MCNC: 1.6 MG/DL (ref 2.5–4.5)
PHOSPHATE SERPL-MCNC: 1.8 MG/DL (ref 2.5–4.5)
PLAT MORPH BLD: NORMAL
PLATELET # BLD AUTO: 233 10*3/MM3 (ref 140–450)
PMV BLD AUTO: 9.6 FL (ref 6–12)
PO2 BLDA: 53.4 MM HG (ref 83–108)
PO2 TEMP ADJ BLD: ABNORMAL MM[HG]
POTASSIUM SERPL-SCNC: 3.8 MMOL/L (ref 3.5–5.2)
POTASSIUM SERPL-SCNC: 3.8 MMOL/L (ref 3.5–5.2)
POTASSIUM SERPL-SCNC: 4 MMOL/L (ref 3.5–5.2)
POTASSIUM SERPL-SCNC: 4.3 MMOL/L (ref 3.5–5.2)
PROT SERPL-MCNC: 6.1 G/DL (ref 6–8.5)
RBC # BLD AUTO: 3.18 10*6/MM3 (ref 3.77–5.28)
RBC MORPH BLD: NORMAL
SAO2 % BLDCOA: 89.4 % (ref 94–99)
SCAN SLIDE: NORMAL
SODIUM SERPL-SCNC: 122 MMOL/L (ref 136–145)
SODIUM SERPL-SCNC: 123 MMOL/L (ref 136–145)
SODIUM SERPL-SCNC: 126 MMOL/L (ref 136–145)
T4 FREE SERPL-MCNC: 0.93 NG/DL (ref 0.93–1.7)
TSH SERPL DL<=0.05 MIU/L-ACNC: 0.11 UIU/ML (ref 0.27–4.2)
VARIANT LYMPHS NFR BLD MANUAL: 6 % (ref 19.6–45.3)
VENTILATOR MODE: ABNORMAL
WBC NRBC COR # BLD: 10.13 10*3/MM3 (ref 3.4–10.8)

## 2022-05-15 PROCEDURE — 25010000002 CEFTRIAXONE PER 250 MG: Performed by: INTERNAL MEDICINE

## 2022-05-15 PROCEDURE — 73560 X-RAY EXAM OF KNEE 1 OR 2: CPT

## 2022-05-15 PROCEDURE — 80053 COMPREHEN METABOLIC PANEL: CPT | Performed by: INTERNAL MEDICINE

## 2022-05-15 PROCEDURE — 83735 ASSAY OF MAGNESIUM: CPT | Performed by: INTERNAL MEDICINE

## 2022-05-15 PROCEDURE — 84100 ASSAY OF PHOSPHORUS: CPT | Performed by: INTERNAL MEDICINE

## 2022-05-15 PROCEDURE — 84443 ASSAY THYROID STIM HORMONE: CPT | Performed by: INTERNAL MEDICINE

## 2022-05-15 PROCEDURE — 25010000002 METHYLPREDNISOLONE PER 40 MG: Performed by: INTERNAL MEDICINE

## 2022-05-15 PROCEDURE — 36600 WITHDRAWAL OF ARTERIAL BLOOD: CPT

## 2022-05-15 PROCEDURE — 94799 UNLISTED PULMONARY SVC/PX: CPT

## 2022-05-15 PROCEDURE — 25010000002 HEPARIN (PORCINE) PER 1000 UNITS: Performed by: HOSPITALIST

## 2022-05-15 PROCEDURE — 84439 ASSAY OF FREE THYROXINE: CPT | Performed by: INTERNAL MEDICINE

## 2022-05-15 PROCEDURE — 82375 ASSAY CARBOXYHB QUANT: CPT

## 2022-05-15 PROCEDURE — 84481 FREE ASSAY (FT-3): CPT | Performed by: INTERNAL MEDICINE

## 2022-05-15 PROCEDURE — 83050 HGB METHEMOGLOBIN QUAN: CPT

## 2022-05-15 PROCEDURE — 82805 BLOOD GASES W/O2 SATURATION: CPT

## 2022-05-15 PROCEDURE — 70450 CT HEAD/BRAIN W/O DYE: CPT

## 2022-05-15 PROCEDURE — 99233 SBSQ HOSP IP/OBS HIGH 50: CPT | Performed by: INTERNAL MEDICINE

## 2022-05-15 PROCEDURE — 25010000002 CYANOCOBALAMIN PER 1000 MCG: Performed by: HOSPITALIST

## 2022-05-15 PROCEDURE — 85025 COMPLETE CBC W/AUTO DIFF WBC: CPT | Performed by: INTERNAL MEDICINE

## 2022-05-15 PROCEDURE — 85007 BL SMEAR W/DIFF WBC COUNT: CPT | Performed by: INTERNAL MEDICINE

## 2022-05-15 PROCEDURE — 84295 ASSAY OF SERUM SODIUM: CPT | Performed by: INTERNAL MEDICINE

## 2022-05-15 RX ORDER — DEXTROSE MONOHYDRATE 50 MG/ML
100 INJECTION, SOLUTION INTRAVENOUS CONTINUOUS
Status: DISCONTINUED | OUTPATIENT
Start: 2022-05-15 | End: 2022-05-15

## 2022-05-15 RX ORDER — CHOLECALCIFEROL (VITAMIN D3) 125 MCG
5 CAPSULE ORAL NIGHTLY PRN
Status: DISCONTINUED | OUTPATIENT
Start: 2022-05-15 | End: 2022-05-19 | Stop reason: HOSPADM

## 2022-05-15 RX ADMIN — GUAIFENESIN 1200 MG: 600 TABLET, EXTENDED RELEASE ORAL at 21:07

## 2022-05-15 RX ADMIN — CYANOCOBALAMIN 1000 MCG: 1000 INJECTION, SOLUTION INTRAMUSCULAR at 10:15

## 2022-05-15 RX ADMIN — HEPARIN SODIUM 5000 UNITS: 5000 INJECTION INTRAVENOUS; SUBCUTANEOUS at 21:07

## 2022-05-15 RX ADMIN — BUDESONIDE AND FORMOTEROL FUMARATE DIHYDRATE 2 PUFF: 160; 4.5 AEROSOL RESPIRATORY (INHALATION) at 18:32

## 2022-05-15 RX ADMIN — BENZONATATE 100 MG: 100 CAPSULE, LIQUID FILLED ORAL at 21:07

## 2022-05-15 RX ADMIN — FAMOTIDINE 20 MG: 20 TABLET, FILM COATED ORAL at 10:16

## 2022-05-15 RX ADMIN — DOXYCYCLINE 100 MG: 100 INJECTION, POWDER, LYOPHILIZED, FOR SOLUTION INTRAVENOUS at 11:45

## 2022-05-15 RX ADMIN — Medication 1 TABLET: at 10:16

## 2022-05-15 RX ADMIN — POTASSIUM & SODIUM PHOSPHATES POWDER PACK 280-160-250 MG 2 PACKET: 280-160-250 PACK at 15:13

## 2022-05-15 RX ADMIN — IPRATROPIUM BROMIDE AND ALBUTEROL SULFATE 3 ML: .5; 3 SOLUTION RESPIRATORY (INHALATION) at 06:08

## 2022-05-15 RX ADMIN — METHYLPREDNISOLONE SODIUM SUCCINATE 40 MG: 40 INJECTION, POWDER, FOR SOLUTION INTRAMUSCULAR; INTRAVENOUS at 12:12

## 2022-05-15 RX ADMIN — FOLIC ACID 1 MG: 1 TABLET ORAL at 10:16

## 2022-05-15 RX ADMIN — DEXTROSE MONOHYDRATE 100 ML/HR: 50 INJECTION, SOLUTION INTRAVENOUS at 06:12

## 2022-05-15 RX ADMIN — CEFTRIAXONE 2 G: 2 INJECTION, POWDER, FOR SOLUTION INTRAMUSCULAR; INTRAVENOUS at 23:07

## 2022-05-15 RX ADMIN — IPRATROPIUM BROMIDE AND ALBUTEROL SULFATE 3 ML: .5; 3 SOLUTION RESPIRATORY (INHALATION) at 00:03

## 2022-05-15 RX ADMIN — Medication 10 ML: at 10:16

## 2022-05-15 RX ADMIN — BUDESONIDE AND FORMOTEROL FUMARATE DIHYDRATE 2 PUFF: 160; 4.5 AEROSOL RESPIRATORY (INHALATION) at 06:08

## 2022-05-15 RX ADMIN — GUAIFENESIN AND DEXTROMETHORPHAN 5 ML: 100; 10 SYRUP ORAL at 23:57

## 2022-05-15 RX ADMIN — POTASSIUM & SODIUM PHOSPHATES POWDER PACK 280-160-250 MG 2 PACKET: 280-160-250 PACK at 23:08

## 2022-05-15 RX ADMIN — POTASSIUM & SODIUM PHOSPHATES POWDER PACK 280-160-250 MG 2 PACKET: 280-160-250 PACK at 05:55

## 2022-05-15 RX ADMIN — BENZONATATE 100 MG: 100 CAPSULE, LIQUID FILLED ORAL at 10:14

## 2022-05-15 RX ADMIN — Medication 10 ML: at 21:07

## 2022-05-15 RX ADMIN — BENZONATATE 100 MG: 100 CAPSULE, LIQUID FILLED ORAL at 15:38

## 2022-05-15 RX ADMIN — METHYLPREDNISOLONE SODIUM SUCCINATE 40 MG: 40 INJECTION, POWDER, FOR SOLUTION INTRAMUSCULAR; INTRAVENOUS at 01:50

## 2022-05-15 RX ADMIN — IPRATROPIUM BROMIDE AND ALBUTEROL SULFATE 3 ML: .5; 3 SOLUTION RESPIRATORY (INHALATION) at 18:33

## 2022-05-15 RX ADMIN — GUAIFENESIN 1200 MG: 600 TABLET, EXTENDED RELEASE ORAL at 10:16

## 2022-05-15 RX ADMIN — FAMOTIDINE 20 MG: 20 TABLET, FILM COATED ORAL at 21:06

## 2022-05-15 RX ADMIN — CLOPIDOGREL BISULFATE 75 MG: 75 TABLET, FILM COATED ORAL at 10:15

## 2022-05-15 RX ADMIN — OFLOXACIN 50000 UNITS: 300 TABLET, COATED ORAL at 10:15

## 2022-05-15 RX ADMIN — DOXYCYCLINE 100 MG: 100 INJECTION, POWDER, LYOPHILIZED, FOR SOLUTION INTRAVENOUS at 23:54

## 2022-05-15 RX ADMIN — IPRATROPIUM BROMIDE AND ALBUTEROL SULFATE 3 ML: .5; 3 SOLUTION RESPIRATORY (INHALATION) at 11:57

## 2022-05-15 RX ADMIN — Medication 5 MG: at 21:06

## 2022-05-16 ENCOUNTER — APPOINTMENT (OUTPATIENT)
Dept: GENERAL RADIOLOGY | Facility: HOSPITAL | Age: 77
End: 2022-05-16

## 2022-05-16 LAB
ALBUMIN SERPL-MCNC: 2.79 G/DL (ref 3.5–5.2)
ALBUMIN/GLOB SERPL: 0.8 G/DL
ALP SERPL-CCNC: 105 U/L (ref 39–117)
ALT SERPL W P-5'-P-CCNC: 45 U/L (ref 1–33)
ANION GAP SERPL CALCULATED.3IONS-SCNC: 10.8 MMOL/L (ref 5–15)
ANION GAP SERPL CALCULATED.3IONS-SCNC: 11.1 MMOL/L (ref 5–15)
ANION GAP SERPL CALCULATED.3IONS-SCNC: 11.4 MMOL/L (ref 5–15)
ANION GAP SERPL CALCULATED.3IONS-SCNC: 8.4 MMOL/L (ref 5–15)
ANION GAP SERPL CALCULATED.3IONS-SCNC: 9.5 MMOL/L (ref 5–15)
ANION GAP SERPL CALCULATED.3IONS-SCNC: 9.8 MMOL/L (ref 5–15)
ANISOCYTOSIS BLD QL: NORMAL
AST SERPL-CCNC: 61 U/L (ref 1–32)
BASOPHILS # BLD AUTO: 0.02 10*3/MM3 (ref 0–0.2)
BASOPHILS NFR BLD AUTO: 0.2 % (ref 0–1.5)
BILIRUB SERPL-MCNC: 0.2 MG/DL (ref 0–1.2)
BUN SERPL-MCNC: 10 MG/DL (ref 8–23)
BUN SERPL-MCNC: 11 MG/DL (ref 8–23)
BUN SERPL-MCNC: 11 MG/DL (ref 8–23)
BUN/CREAT SERPL: 13.3 (ref 7–25)
BUN/CREAT SERPL: 13.8 (ref 7–25)
BUN/CREAT SERPL: 14.7 (ref 7–25)
BUN/CREAT SERPL: 15.4 (ref 7–25)
BUN/CREAT SERPL: 16.1 (ref 7–25)
BUN/CREAT SERPL: 16.4 (ref 7–25)
CALCIUM SPEC-SCNC: 8.1 MG/DL (ref 8.6–10.5)
CALCIUM SPEC-SCNC: 8.2 MG/DL (ref 8.6–10.5)
CALCIUM SPEC-SCNC: 8.2 MG/DL (ref 8.6–10.5)
CALCIUM SPEC-SCNC: 8.3 MG/DL (ref 8.6–10.5)
CALCIUM SPEC-SCNC: 8.3 MG/DL (ref 8.6–10.5)
CALCIUM SPEC-SCNC: 8.4 MG/DL (ref 8.6–10.5)
CHLORIDE SERPL-SCNC: 93 MMOL/L (ref 98–107)
CHLORIDE SERPL-SCNC: 94 MMOL/L (ref 98–107)
CHLORIDE SERPL-SCNC: 95 MMOL/L (ref 98–107)
CHLORIDE SERPL-SCNC: 97 MMOL/L (ref 98–107)
CHLORIDE SERPL-SCNC: 97 MMOL/L (ref 98–107)
CHLORIDE SERPL-SCNC: 98 MMOL/L (ref 98–107)
CO2 SERPL-SCNC: 21.9 MMOL/L (ref 22–29)
CO2 SERPL-SCNC: 23.2 MMOL/L (ref 22–29)
CO2 SERPL-SCNC: 23.5 MMOL/L (ref 22–29)
CO2 SERPL-SCNC: 23.6 MMOL/L (ref 22–29)
CO2 SERPL-SCNC: 24.2 MMOL/L (ref 22–29)
CO2 SERPL-SCNC: 24.6 MMOL/L (ref 22–29)
CREAT SERPL-MCNC: 0.61 MG/DL (ref 0.57–1)
CREAT SERPL-MCNC: 0.62 MG/DL (ref 0.57–1)
CREAT SERPL-MCNC: 0.65 MG/DL (ref 0.57–1)
CREAT SERPL-MCNC: 0.68 MG/DL (ref 0.57–1)
CREAT SERPL-MCNC: 0.8 MG/DL (ref 0.57–1)
CREAT SERPL-MCNC: 0.83 MG/DL (ref 0.57–1)
DEPRECATED RDW RBC AUTO: 43.5 FL (ref 37–54)
EGFRCR SERPLBLD CKD-EPI 2021: 72.3 ML/MIN/1.73
EGFRCR SERPLBLD CKD-EPI 2021: 75.5 ML/MIN/1.73
EGFRCR SERPLBLD CKD-EPI 2021: 89.3 ML/MIN/1.73
EGFRCR SERPLBLD CKD-EPI 2021: 90.2 ML/MIN/1.73
EGFRCR SERPLBLD CKD-EPI 2021: 91.3 ML/MIN/1.73
EGFRCR SERPLBLD CKD-EPI 2021: 91.6 ML/MIN/1.73
EOSINOPHIL # BLD AUTO: 0 10*3/MM3 (ref 0–0.4)
EOSINOPHIL NFR BLD AUTO: 0 % (ref 0.3–6.2)
ERYTHROCYTE [DISTWIDTH] IN BLOOD BY AUTOMATED COUNT: 13.1 % (ref 12.3–15.4)
GLOBULIN UR ELPH-MCNC: 3.3 GM/DL
GLUCOSE SERPL-MCNC: 154 MG/DL (ref 65–99)
GLUCOSE SERPL-MCNC: 156 MG/DL (ref 65–99)
GLUCOSE SERPL-MCNC: 160 MG/DL (ref 65–99)
GLUCOSE SERPL-MCNC: 164 MG/DL (ref 65–99)
GLUCOSE SERPL-MCNC: 175 MG/DL (ref 65–99)
GLUCOSE SERPL-MCNC: 188 MG/DL (ref 65–99)
HCT VFR BLD AUTO: 27.9 % (ref 34–46.6)
HGB BLD-MCNC: 9.4 G/DL (ref 12–15.9)
IMM GRANULOCYTES # BLD AUTO: 0.21 10*3/MM3 (ref 0–0.05)
IMM GRANULOCYTES NFR BLD AUTO: 1.8 % (ref 0–0.5)
LYMPHOCYTES # BLD AUTO: 1.03 10*3/MM3 (ref 0.7–3.1)
LYMPHOCYTES NFR BLD AUTO: 8.7 % (ref 19.6–45.3)
MAGNESIUM SERPL-MCNC: 2.3 MG/DL (ref 1.6–2.4)
MCH RBC QN AUTO: 31 PG (ref 26.6–33)
MCHC RBC AUTO-ENTMCNC: 33.7 G/DL (ref 31.5–35.7)
MCV RBC AUTO: 92.1 FL (ref 79–97)
MONOCYTES # BLD AUTO: 1.31 10*3/MM3 (ref 0.1–0.9)
MONOCYTES NFR BLD AUTO: 11.1 % (ref 5–12)
NEUTROPHILS NFR BLD AUTO: 78.2 % (ref 42.7–76)
NEUTROPHILS NFR BLD AUTO: 9.28 10*3/MM3 (ref 1.7–7)
NRBC BLD AUTO-RTO: 0 /100 WBC (ref 0–0.2)
PHOSPHATE SERPL-MCNC: 1.5 MG/DL (ref 2.5–4.5)
PHOSPHATE SERPL-MCNC: 1.8 MG/DL (ref 2.5–4.5)
PHOSPHATE SERPL-MCNC: 1.8 MG/DL (ref 2.5–4.5)
PHOSPHATE SERPL-MCNC: 2 MG/DL (ref 2.5–4.5)
PLAT MORPH BLD: NORMAL
PLATELET # BLD AUTO: 289 10*3/MM3 (ref 140–450)
PMV BLD AUTO: 9.2 FL (ref 6–12)
POTASSIUM SERPL-SCNC: 4.1 MMOL/L (ref 3.5–5.2)
POTASSIUM SERPL-SCNC: 4.2 MMOL/L (ref 3.5–5.2)
POTASSIUM SERPL-SCNC: 4.3 MMOL/L (ref 3.5–5.2)
POTASSIUM SERPL-SCNC: 4.5 MMOL/L (ref 3.5–5.2)
PROT SERPL-MCNC: 6.1 G/DL (ref 6–8.5)
RBC # BLD AUTO: 3.03 10*6/MM3 (ref 3.77–5.28)
SODIUM SERPL-SCNC: 126 MMOL/L (ref 136–145)
SODIUM SERPL-SCNC: 127 MMOL/L (ref 136–145)
SODIUM SERPL-SCNC: 128 MMOL/L (ref 136–145)
SODIUM SERPL-SCNC: 129 MMOL/L (ref 136–145)
SODIUM SERPL-SCNC: 130 MMOL/L (ref 136–145)
SODIUM SERPL-SCNC: 131 MMOL/L (ref 136–145)
SODIUM SERPL-SCNC: 131 MMOL/L (ref 136–145)
SODIUM SERPL-SCNC: 132 MMOL/L (ref 136–145)
SODIUM SERPL-SCNC: 133 MMOL/L (ref 136–145)
T3FREE SERPL-MCNC: 1.44 PG/ML (ref 2–4.4)
TOXIC GRANULATION: NORMAL
WBC NRBC COR # BLD: 11.85 10*3/MM3 (ref 3.4–10.8)

## 2022-05-16 PROCEDURE — 84100 ASSAY OF PHOSPHORUS: CPT | Performed by: STUDENT IN AN ORGANIZED HEALTH CARE EDUCATION/TRAINING PROGRAM

## 2022-05-16 PROCEDURE — 94799 UNLISTED PULMONARY SVC/PX: CPT

## 2022-05-16 PROCEDURE — 80053 COMPREHEN METABOLIC PANEL: CPT | Performed by: INTERNAL MEDICINE

## 2022-05-16 PROCEDURE — 84100 ASSAY OF PHOSPHORUS: CPT | Performed by: INTERNAL MEDICINE

## 2022-05-16 PROCEDURE — 99232 SBSQ HOSP IP/OBS MODERATE 35: CPT | Performed by: STUDENT IN AN ORGANIZED HEALTH CARE EDUCATION/TRAINING PROGRAM

## 2022-05-16 PROCEDURE — 83735 ASSAY OF MAGNESIUM: CPT | Performed by: INTERNAL MEDICINE

## 2022-05-16 PROCEDURE — 85025 COMPLETE CBC W/AUTO DIFF WBC: CPT | Performed by: INTERNAL MEDICINE

## 2022-05-16 PROCEDURE — 25010000002 CEFTRIAXONE PER 250 MG: Performed by: INTERNAL MEDICINE

## 2022-05-16 PROCEDURE — 71045 X-RAY EXAM CHEST 1 VIEW: CPT | Performed by: RADIOLOGY

## 2022-05-16 PROCEDURE — 84295 ASSAY OF SERUM SODIUM: CPT | Performed by: INTERNAL MEDICINE

## 2022-05-16 PROCEDURE — 25010000002 HEPARIN (PORCINE) PER 1000 UNITS: Performed by: HOSPITALIST

## 2022-05-16 PROCEDURE — 84100 ASSAY OF PHOSPHORUS: CPT | Performed by: HOSPITALIST

## 2022-05-16 PROCEDURE — 85007 BL SMEAR W/DIFF WBC COUNT: CPT | Performed by: INTERNAL MEDICINE

## 2022-05-16 PROCEDURE — 71045 X-RAY EXAM CHEST 1 VIEW: CPT

## 2022-05-16 PROCEDURE — 94761 N-INVAS EAR/PLS OXIMETRY MLT: CPT

## 2022-05-16 PROCEDURE — 25010000002 METHYLPREDNISOLONE PER 40 MG: Performed by: INTERNAL MEDICINE

## 2022-05-16 RX ORDER — DEXTROSE MONOHYDRATE 50 MG/ML
75 INJECTION, SOLUTION INTRAVENOUS CONTINUOUS
Status: DISCONTINUED | OUTPATIENT
Start: 2022-05-16 | End: 2022-05-17

## 2022-05-16 RX ORDER — SODIUM CHLORIDE 9 MG/ML
40 INJECTION, SOLUTION INTRAVENOUS CONTINUOUS
Status: DISCONTINUED | OUTPATIENT
Start: 2022-05-16 | End: 2022-05-16

## 2022-05-16 RX ORDER — SODIUM CHLORIDE 9 MG/ML
40 INJECTION, SOLUTION INTRAVENOUS CONTINUOUS
Status: DISCONTINUED | OUTPATIENT
Start: 2022-05-16 | End: 2022-05-17

## 2022-05-16 RX ORDER — DEXTROSE MONOHYDRATE 50 MG/ML
75 INJECTION, SOLUTION INTRAVENOUS CONTINUOUS
Status: DISCONTINUED | OUTPATIENT
Start: 2022-05-16 | End: 2022-05-16

## 2022-05-16 RX ADMIN — ASPIRIN 81 MG: 81 TABLET, COATED ORAL at 08:40

## 2022-05-16 RX ADMIN — CLOPIDOGREL BISULFATE 75 MG: 75 TABLET, FILM COATED ORAL at 08:40

## 2022-05-16 RX ADMIN — BENZONATATE 100 MG: 100 CAPSULE, LIQUID FILLED ORAL at 08:40

## 2022-05-16 RX ADMIN — HEPARIN SODIUM 5000 UNITS: 5000 INJECTION INTRAVENOUS; SUBCUTANEOUS at 20:11

## 2022-05-16 RX ADMIN — SODIUM CHLORIDE 75 ML/HR: 9 INJECTION, SOLUTION INTRAVENOUS at 01:06

## 2022-05-16 RX ADMIN — POTASSIUM & SODIUM PHOSPHATES POWDER PACK 280-160-250 MG 2 PACKET: 280-160-250 PACK at 08:40

## 2022-05-16 RX ADMIN — POTASSIUM & SODIUM PHOSPHATES POWDER PACK 280-160-250 MG 2 PACKET: 280-160-250 PACK at 23:31

## 2022-05-16 RX ADMIN — METHYLPREDNISOLONE SODIUM SUCCINATE 40 MG: 40 INJECTION, POWDER, FOR SOLUTION INTRAMUSCULAR; INTRAVENOUS at 01:06

## 2022-05-16 RX ADMIN — Medication 1 TABLET: at 08:40

## 2022-05-16 RX ADMIN — BENZONATATE 100 MG: 100 CAPSULE, LIQUID FILLED ORAL at 20:11

## 2022-05-16 RX ADMIN — DEXTROSE MONOHYDRATE 100 ML/HR: 50 INJECTION, SOLUTION INTRAVENOUS at 06:04

## 2022-05-16 RX ADMIN — Medication 10 ML: at 20:10

## 2022-05-16 RX ADMIN — METHYLPREDNISOLONE SODIUM SUCCINATE 40 MG: 40 INJECTION, POWDER, FOR SOLUTION INTRAMUSCULAR; INTRAVENOUS at 23:20

## 2022-05-16 RX ADMIN — BENZONATATE 100 MG: 100 CAPSULE, LIQUID FILLED ORAL at 15:48

## 2022-05-16 RX ADMIN — FAMOTIDINE 20 MG: 20 TABLET, FILM COATED ORAL at 20:11

## 2022-05-16 RX ADMIN — SODIUM CHLORIDE 40 ML/HR: 9 INJECTION, SOLUTION INTRAVENOUS at 14:35

## 2022-05-16 RX ADMIN — FOLIC ACID 1 MG: 1 TABLET ORAL at 08:40

## 2022-05-16 RX ADMIN — CEFTRIAXONE 2 G: 2 INJECTION, POWDER, FOR SOLUTION INTRAMUSCULAR; INTRAVENOUS at 22:14

## 2022-05-16 RX ADMIN — BUDESONIDE AND FORMOTEROL FUMARATE DIHYDRATE 2 PUFF: 160; 4.5 AEROSOL RESPIRATORY (INHALATION) at 19:56

## 2022-05-16 RX ADMIN — GUAIFENESIN 1200 MG: 600 TABLET, EXTENDED RELEASE ORAL at 20:11

## 2022-05-16 RX ADMIN — IPRATROPIUM BROMIDE AND ALBUTEROL SULFATE 3 ML: .5; 3 SOLUTION RESPIRATORY (INHALATION) at 19:56

## 2022-05-16 RX ADMIN — POTASSIUM & SODIUM PHOSPHATES POWDER PACK 280-160-250 MG 2 PACKET: 280-160-250 PACK at 15:48

## 2022-05-16 RX ADMIN — BUDESONIDE AND FORMOTEROL FUMARATE DIHYDRATE 2 PUFF: 160; 4.5 AEROSOL RESPIRATORY (INHALATION) at 06:36

## 2022-05-16 RX ADMIN — IPRATROPIUM BROMIDE AND ALBUTEROL SULFATE 3 ML: .5; 3 SOLUTION RESPIRATORY (INHALATION) at 13:00

## 2022-05-16 RX ADMIN — GUAIFENESIN 1200 MG: 600 TABLET, EXTENDED RELEASE ORAL at 08:40

## 2022-05-16 RX ADMIN — Medication 10 ML: at 08:40

## 2022-05-16 RX ADMIN — ACETAMINOPHEN 650 MG: 325 TABLET ORAL at 06:04

## 2022-05-16 RX ADMIN — DOXYCYCLINE 100 MG: 100 INJECTION, POWDER, LYOPHILIZED, FOR SOLUTION INTRAVENOUS at 11:02

## 2022-05-16 RX ADMIN — FAMOTIDINE 20 MG: 20 TABLET, FILM COATED ORAL at 08:40

## 2022-05-16 RX ADMIN — IPRATROPIUM BROMIDE AND ALBUTEROL SULFATE 3 ML: .5; 3 SOLUTION RESPIRATORY (INHALATION) at 00:16

## 2022-05-16 RX ADMIN — HEPARIN SODIUM 5000 UNITS: 5000 INJECTION INTRAVENOUS; SUBCUTANEOUS at 08:40

## 2022-05-16 RX ADMIN — METHYLPREDNISOLONE SODIUM SUCCINATE 40 MG: 40 INJECTION, POWDER, FOR SOLUTION INTRAMUSCULAR; INTRAVENOUS at 12:10

## 2022-05-16 RX ADMIN — IPRATROPIUM BROMIDE AND ALBUTEROL SULFATE 3 ML: .5; 3 SOLUTION RESPIRATORY (INHALATION) at 06:17

## 2022-05-16 RX ADMIN — DOXYCYCLINE 100 MG: 100 INJECTION, POWDER, LYOPHILIZED, FOR SOLUTION INTRAVENOUS at 23:20

## 2022-05-17 LAB
ANION GAP SERPL CALCULATED.3IONS-SCNC: 9.6 MMOL/L (ref 5–15)
BUN SERPL-MCNC: 13 MG/DL (ref 8–23)
BUN/CREAT SERPL: 18.1 (ref 7–25)
CALCIUM SPEC-SCNC: 8 MG/DL (ref 8.6–10.5)
CHLORIDE SERPL-SCNC: 99 MMOL/L (ref 98–107)
CO2 SERPL-SCNC: 23.4 MMOL/L (ref 22–29)
CREAT SERPL-MCNC: 0.72 MG/DL (ref 0.57–1)
EGFRCR SERPLBLD CKD-EPI 2021: 85.7 ML/MIN/1.73
GLUCOSE SERPL-MCNC: 146 MG/DL (ref 65–99)
PHOSPHATE SERPL-MCNC: 1.7 MG/DL (ref 2.5–4.5)
PHOSPHATE SERPL-MCNC: 2.4 MG/DL (ref 2.5–4.5)
POTASSIUM SERPL-SCNC: 5.1 MMOL/L (ref 3.5–5.2)
SODIUM SERPL-SCNC: 127 MMOL/L (ref 136–145)
SODIUM SERPL-SCNC: 130 MMOL/L (ref 136–145)
SODIUM SERPL-SCNC: 132 MMOL/L (ref 136–145)
SODIUM SERPL-SCNC: 133 MMOL/L (ref 136–145)
SODIUM SERPL-SCNC: 136 MMOL/L (ref 136–145)

## 2022-05-17 PROCEDURE — 94799 UNLISTED PULMONARY SVC/PX: CPT

## 2022-05-17 PROCEDURE — 97166 OT EVAL MOD COMPLEX 45 MIN: CPT

## 2022-05-17 PROCEDURE — 99232 SBSQ HOSP IP/OBS MODERATE 35: CPT | Performed by: STUDENT IN AN ORGANIZED HEALTH CARE EDUCATION/TRAINING PROGRAM

## 2022-05-17 PROCEDURE — 84295 ASSAY OF SERUM SODIUM: CPT | Performed by: INTERNAL MEDICINE

## 2022-05-17 PROCEDURE — 84295 ASSAY OF SERUM SODIUM: CPT

## 2022-05-17 PROCEDURE — 84100 ASSAY OF PHOSPHORUS: CPT | Performed by: STUDENT IN AN ORGANIZED HEALTH CARE EDUCATION/TRAINING PROGRAM

## 2022-05-17 PROCEDURE — 97161 PT EVAL LOW COMPLEX 20 MIN: CPT

## 2022-05-17 PROCEDURE — 25010000002 METHYLPREDNISOLONE PER 40 MG: Performed by: INTERNAL MEDICINE

## 2022-05-17 PROCEDURE — 25010000002 ENOXAPARIN PER 10 MG: Performed by: STUDENT IN AN ORGANIZED HEALTH CARE EDUCATION/TRAINING PROGRAM

## 2022-05-17 PROCEDURE — 80048 BASIC METABOLIC PNL TOTAL CA: CPT | Performed by: HOSPITALIST

## 2022-05-17 PROCEDURE — 94761 N-INVAS EAR/PLS OXIMETRY MLT: CPT

## 2022-05-17 PROCEDURE — 25010000002 HEPARIN (PORCINE) PER 1000 UNITS: Performed by: HOSPITALIST

## 2022-05-17 PROCEDURE — 84100 ASSAY OF PHOSPHORUS: CPT

## 2022-05-17 RX ORDER — PREDNISONE 20 MG/1
40 TABLET ORAL
Status: DISCONTINUED | OUTPATIENT
Start: 2022-05-17 | End: 2022-05-17

## 2022-05-17 RX ORDER — PREDNISONE 20 MG/1
40 TABLET ORAL
Status: DISCONTINUED | OUTPATIENT
Start: 2022-05-18 | End: 2022-05-19 | Stop reason: HOSPADM

## 2022-05-17 RX ORDER — DOXYCYCLINE 100 MG/1
100 CAPSULE ORAL EVERY 12 HOURS SCHEDULED
Status: DISCONTINUED | OUTPATIENT
Start: 2022-05-17 | End: 2022-05-17

## 2022-05-17 RX ORDER — ENOXAPARIN SODIUM 100 MG/ML
40 INJECTION SUBCUTANEOUS NIGHTLY
Status: DISCONTINUED | OUTPATIENT
Start: 2022-05-17 | End: 2022-05-19 | Stop reason: HOSPADM

## 2022-05-17 RX ORDER — AMOXICILLIN AND CLAVULANATE POTASSIUM 875; 125 MG/1; MG/1
1 TABLET, FILM COATED ORAL EVERY 12 HOURS SCHEDULED
Status: DISCONTINUED | OUTPATIENT
Start: 2022-05-17 | End: 2022-05-17

## 2022-05-17 RX ORDER — DOXYCYCLINE 100 MG/1
100 CAPSULE ORAL EVERY 12 HOURS SCHEDULED
Status: DISCONTINUED | OUTPATIENT
Start: 2022-05-17 | End: 2022-05-19 | Stop reason: HOSPADM

## 2022-05-17 RX ORDER — AMOXICILLIN AND CLAVULANATE POTASSIUM 875; 125 MG/1; MG/1
1 TABLET, FILM COATED ORAL EVERY 12 HOURS SCHEDULED
Status: DISCONTINUED | OUTPATIENT
Start: 2022-05-17 | End: 2022-05-19 | Stop reason: HOSPADM

## 2022-05-17 RX ORDER — SODIUM CHLORIDE 9 MG/ML
40 INJECTION, SOLUTION INTRAVENOUS CONTINUOUS
Status: DISCONTINUED | OUTPATIENT
Start: 2022-05-17 | End: 2022-05-18

## 2022-05-17 RX ORDER — DEXTROSE MONOHYDRATE 50 MG/ML
75 INJECTION, SOLUTION INTRAVENOUS CONTINUOUS
Status: DISCONTINUED | OUTPATIENT
Start: 2022-05-17 | End: 2022-05-17

## 2022-05-17 RX ADMIN — SODIUM CHLORIDE 40 ML/HR: 9 INJECTION, SOLUTION INTRAVENOUS at 12:21

## 2022-05-17 RX ADMIN — FAMOTIDINE 20 MG: 20 TABLET, FILM COATED ORAL at 19:30

## 2022-05-17 RX ADMIN — GUAIFENESIN 1200 MG: 600 TABLET, EXTENDED RELEASE ORAL at 19:30

## 2022-05-17 RX ADMIN — FOLIC ACID 1 MG: 1 TABLET ORAL at 08:11

## 2022-05-17 RX ADMIN — Medication 10 ML: at 19:31

## 2022-05-17 RX ADMIN — Medication 10 ML: at 08:12

## 2022-05-17 RX ADMIN — BENZONATATE 100 MG: 100 CAPSULE, LIQUID FILLED ORAL at 08:12

## 2022-05-17 RX ADMIN — AMOXICILLIN AND CLAVULANATE POTASSIUM 1 TABLET: 875; 125 TABLET, FILM COATED ORAL at 19:31

## 2022-05-17 RX ADMIN — CLOPIDOGREL BISULFATE 75 MG: 75 TABLET, FILM COATED ORAL at 08:12

## 2022-05-17 RX ADMIN — HEPARIN SODIUM 5000 UNITS: 5000 INJECTION INTRAVENOUS; SUBCUTANEOUS at 08:12

## 2022-05-17 RX ADMIN — IPRATROPIUM BROMIDE AND ALBUTEROL SULFATE 3 ML: .5; 3 SOLUTION RESPIRATORY (INHALATION) at 06:46

## 2022-05-17 RX ADMIN — IPRATROPIUM BROMIDE AND ALBUTEROL SULFATE 3 ML: .5; 3 SOLUTION RESPIRATORY (INHALATION) at 13:06

## 2022-05-17 RX ADMIN — DEXTROSE MONOHYDRATE 75 ML/HR: 50 INJECTION, SOLUTION INTRAVENOUS at 08:16

## 2022-05-17 RX ADMIN — ENOXAPARIN SODIUM 40 MG: 40 INJECTION SUBCUTANEOUS at 19:30

## 2022-05-17 RX ADMIN — BENZONATATE 100 MG: 100 CAPSULE, LIQUID FILLED ORAL at 15:02

## 2022-05-17 RX ADMIN — ASPIRIN 81 MG: 81 TABLET, COATED ORAL at 08:11

## 2022-05-17 RX ADMIN — BUDESONIDE AND FORMOTEROL FUMARATE DIHYDRATE 2 PUFF: 160; 4.5 AEROSOL RESPIRATORY (INHALATION) at 18:26

## 2022-05-17 RX ADMIN — BENZONATATE 100 MG: 100 CAPSULE, LIQUID FILLED ORAL at 19:30

## 2022-05-17 RX ADMIN — METHYLPREDNISOLONE SODIUM SUCCINATE 40 MG: 40 INJECTION, POWDER, FOR SOLUTION INTRAMUSCULAR; INTRAVENOUS at 12:22

## 2022-05-17 RX ADMIN — POTASSIUM & SODIUM PHOSPHATES POWDER PACK 280-160-250 MG 2 PACKET: 280-160-250 PACK at 19:30

## 2022-05-17 RX ADMIN — Medication 1 TABLET: at 08:12

## 2022-05-17 RX ADMIN — FAMOTIDINE 20 MG: 20 TABLET, FILM COATED ORAL at 08:12

## 2022-05-17 RX ADMIN — DOXYCYCLINE 100 MG: 100 INJECTION, POWDER, LYOPHILIZED, FOR SOLUTION INTRAVENOUS at 11:42

## 2022-05-17 RX ADMIN — BUDESONIDE AND FORMOTEROL FUMARATE DIHYDRATE 2 PUFF: 160; 4.5 AEROSOL RESPIRATORY (INHALATION) at 06:55

## 2022-05-17 RX ADMIN — IPRATROPIUM BROMIDE AND ALBUTEROL SULFATE 3 ML: .5; 3 SOLUTION RESPIRATORY (INHALATION) at 18:26

## 2022-05-17 RX ADMIN — IPRATROPIUM BROMIDE AND ALBUTEROL SULFATE 3 ML: .5; 3 SOLUTION RESPIRATORY (INHALATION) at 01:06

## 2022-05-17 RX ADMIN — GUAIFENESIN 1200 MG: 600 TABLET, EXTENDED RELEASE ORAL at 08:12

## 2022-05-17 RX ADMIN — DOXYCYCLINE 100 MG: 100 CAPSULE ORAL at 19:31

## 2022-05-17 RX ADMIN — POTASSIUM & SODIUM PHOSPHATES POWDER PACK 280-160-250 MG 2 PACKET: 280-160-250 PACK at 11:53

## 2022-05-18 LAB
ANION GAP SERPL CALCULATED.3IONS-SCNC: 8.2 MMOL/L (ref 5–15)
BACTERIA SPEC AEROBE CULT: NORMAL
BACTERIA SPEC AEROBE CULT: NORMAL
BUN SERPL-MCNC: 8 MG/DL (ref 8–23)
BUN/CREAT SERPL: 11.9 (ref 7–25)
CALCIUM SPEC-SCNC: 8.4 MG/DL (ref 8.6–10.5)
CHLORIDE SERPL-SCNC: 100 MMOL/L (ref 98–107)
CO2 SERPL-SCNC: 25.8 MMOL/L (ref 22–29)
CREAT SERPL-MCNC: 0.67 MG/DL (ref 0.57–1)
EGFRCR SERPLBLD CKD-EPI 2021: 89.6 ML/MIN/1.73
GLUCOSE SERPL-MCNC: 99 MG/DL (ref 65–99)
PHOSPHATE SERPL-MCNC: 2.7 MG/DL (ref 2.5–4.5)
POTASSIUM SERPL-SCNC: 4.7 MMOL/L (ref 3.5–5.2)
SODIUM SERPL-SCNC: 134 MMOL/L (ref 136–145)

## 2022-05-18 PROCEDURE — 94761 N-INVAS EAR/PLS OXIMETRY MLT: CPT

## 2022-05-18 PROCEDURE — 25010000002 ENOXAPARIN PER 10 MG: Performed by: STUDENT IN AN ORGANIZED HEALTH CARE EDUCATION/TRAINING PROGRAM

## 2022-05-18 PROCEDURE — 80048 BASIC METABOLIC PNL TOTAL CA: CPT | Performed by: STUDENT IN AN ORGANIZED HEALTH CARE EDUCATION/TRAINING PROGRAM

## 2022-05-18 PROCEDURE — 84100 ASSAY OF PHOSPHORUS: CPT | Performed by: INTERNAL MEDICINE

## 2022-05-18 PROCEDURE — 94799 UNLISTED PULMONARY SVC/PX: CPT

## 2022-05-18 PROCEDURE — 63710000001 PREDNISONE PER 1 MG: Performed by: STUDENT IN AN ORGANIZED HEALTH CARE EDUCATION/TRAINING PROGRAM

## 2022-05-18 PROCEDURE — 99232 SBSQ HOSP IP/OBS MODERATE 35: CPT | Performed by: STUDENT IN AN ORGANIZED HEALTH CARE EDUCATION/TRAINING PROGRAM

## 2022-05-18 RX ORDER — FLUCONAZOLE 100 MG/1
150 TABLET ORAL ONCE
Status: COMPLETED | OUTPATIENT
Start: 2022-05-18 | End: 2022-05-18

## 2022-05-18 RX ADMIN — IPRATROPIUM BROMIDE AND ALBUTEROL SULFATE 3 ML: .5; 3 SOLUTION RESPIRATORY (INHALATION) at 07:05

## 2022-05-18 RX ADMIN — FOLIC ACID 1 MG: 1 TABLET ORAL at 08:42

## 2022-05-18 RX ADMIN — BENZONATATE 100 MG: 100 CAPSULE, LIQUID FILLED ORAL at 08:42

## 2022-05-18 RX ADMIN — BENZONATATE 100 MG: 100 CAPSULE, LIQUID FILLED ORAL at 16:01

## 2022-05-18 RX ADMIN — GUAIFENESIN 1200 MG: 600 TABLET, EXTENDED RELEASE ORAL at 08:42

## 2022-05-18 RX ADMIN — FAMOTIDINE 20 MG: 20 TABLET, FILM COATED ORAL at 20:09

## 2022-05-18 RX ADMIN — CLOPIDOGREL BISULFATE 75 MG: 75 TABLET, FILM COATED ORAL at 08:42

## 2022-05-18 RX ADMIN — BENZONATATE 100 MG: 100 CAPSULE, LIQUID FILLED ORAL at 20:09

## 2022-05-18 RX ADMIN — BUDESONIDE AND FORMOTEROL FUMARATE DIHYDRATE 2 PUFF: 160; 4.5 AEROSOL RESPIRATORY (INHALATION) at 19:09

## 2022-05-18 RX ADMIN — Medication 1 TABLET: at 08:42

## 2022-05-18 RX ADMIN — Medication 10 ML: at 08:43

## 2022-05-18 RX ADMIN — IPRATROPIUM BROMIDE AND ALBUTEROL SULFATE 3 ML: .5; 3 SOLUTION RESPIRATORY (INHALATION) at 00:03

## 2022-05-18 RX ADMIN — AMOXICILLIN AND CLAVULANATE POTASSIUM 1 TABLET: 875; 125 TABLET, FILM COATED ORAL at 08:42

## 2022-05-18 RX ADMIN — GUAIFENESIN 1200 MG: 600 TABLET, EXTENDED RELEASE ORAL at 20:09

## 2022-05-18 RX ADMIN — IPRATROPIUM BROMIDE AND ALBUTEROL SULFATE 3 ML: .5; 3 SOLUTION RESPIRATORY (INHALATION) at 19:09

## 2022-05-18 RX ADMIN — AMOXICILLIN AND CLAVULANATE POTASSIUM 1 TABLET: 875; 125 TABLET, FILM COATED ORAL at 20:09

## 2022-05-18 RX ADMIN — ENOXAPARIN SODIUM 40 MG: 40 INJECTION SUBCUTANEOUS at 20:09

## 2022-05-18 RX ADMIN — DOXYCYCLINE 100 MG: 100 CAPSULE ORAL at 20:09

## 2022-05-18 RX ADMIN — BUDESONIDE AND FORMOTEROL FUMARATE DIHYDRATE 2 PUFF: 160; 4.5 AEROSOL RESPIRATORY (INHALATION) at 07:05

## 2022-05-18 RX ADMIN — Medication 5 MG: at 20:08

## 2022-05-18 RX ADMIN — ASPIRIN 81 MG: 81 TABLET, COATED ORAL at 08:42

## 2022-05-18 RX ADMIN — PREDNISONE 40 MG: 20 TABLET ORAL at 08:41

## 2022-05-18 RX ADMIN — FAMOTIDINE 20 MG: 20 TABLET, FILM COATED ORAL at 08:42

## 2022-05-18 RX ADMIN — FLUCONAZOLE 150 MG: 100 TABLET ORAL at 13:01

## 2022-05-18 RX ADMIN — DOXYCYCLINE 100 MG: 100 CAPSULE ORAL at 08:42

## 2022-05-19 ENCOUNTER — PATIENT OUTREACH (OUTPATIENT)
Dept: CASE MANAGEMENT | Facility: OTHER | Age: 77
End: 2022-05-19

## 2022-05-19 VITALS
DIASTOLIC BLOOD PRESSURE: 74 MMHG | OXYGEN SATURATION: 96 % | RESPIRATION RATE: 18 BRPM | BODY MASS INDEX: 32.87 KG/M2 | TEMPERATURE: 98.3 F | HEIGHT: 66 IN | HEART RATE: 93 BPM | WEIGHT: 204.5 LBS | SYSTOLIC BLOOD PRESSURE: 151 MMHG

## 2022-05-19 LAB
DEPRECATED RDW RBC AUTO: 46.5 FL (ref 37–54)
ERYTHROCYTE [DISTWIDTH] IN BLOOD BY AUTOMATED COUNT: 13.8 % (ref 12.3–15.4)
HCT VFR BLD AUTO: 30.7 % (ref 34–46.6)
HGB BLD-MCNC: 10.4 G/DL (ref 12–15.9)
LYMPHOCYTES # BLD MANUAL: 3.84 10*3/MM3 (ref 0.7–3.1)
LYMPHOCYTES NFR BLD MANUAL: 4 % (ref 5–12)
MCH RBC QN AUTO: 31.3 PG (ref 26.6–33)
MCHC RBC AUTO-ENTMCNC: 33.9 G/DL (ref 31.5–35.7)
MCV RBC AUTO: 92.5 FL (ref 79–97)
METAMYELOCYTES NFR BLD MANUAL: 2 % (ref 0–0)
MONOCYTES # BLD: 0.61 10*3/MM3 (ref 0.1–0.9)
NEUTROPHILS # BLD AUTO: 10.61 10*3/MM3 (ref 1.7–7)
NEUTROPHILS NFR BLD MANUAL: 69 % (ref 42.7–76)
NRBC SPEC MANUAL: 1 /100 WBC (ref 0–0.2)
PLAT MORPH BLD: NORMAL
PLATELET # BLD AUTO: 252 10*3/MM3 (ref 140–450)
PMV BLD AUTO: 9.3 FL (ref 6–12)
RBC # BLD AUTO: 3.32 10*6/MM3 (ref 3.77–5.28)
RBC MORPH BLD: NORMAL
SCAN SLIDE: NORMAL
TOXIC GRANULATION: ABNORMAL
VARIANT LYMPHS NFR BLD MANUAL: 25 % (ref 19.6–45.3)
WBC NRBC COR # BLD: 15.37 10*3/MM3 (ref 3.4–10.8)

## 2022-05-19 PROCEDURE — 85025 COMPLETE CBC W/AUTO DIFF WBC: CPT | Performed by: STUDENT IN AN ORGANIZED HEALTH CARE EDUCATION/TRAINING PROGRAM

## 2022-05-19 PROCEDURE — 94799 UNLISTED PULMONARY SVC/PX: CPT

## 2022-05-19 PROCEDURE — 85007 BL SMEAR W/DIFF WBC COUNT: CPT | Performed by: STUDENT IN AN ORGANIZED HEALTH CARE EDUCATION/TRAINING PROGRAM

## 2022-05-19 PROCEDURE — 94761 N-INVAS EAR/PLS OXIMETRY MLT: CPT

## 2022-05-19 PROCEDURE — 99239 HOSP IP/OBS DSCHRG MGMT >30: CPT | Performed by: STUDENT IN AN ORGANIZED HEALTH CARE EDUCATION/TRAINING PROGRAM

## 2022-05-19 PROCEDURE — 63710000001 PREDNISONE PER 1 MG: Performed by: STUDENT IN AN ORGANIZED HEALTH CARE EDUCATION/TRAINING PROGRAM

## 2022-05-19 RX ORDER — AMOXICILLIN AND CLAVULANATE POTASSIUM 875; 125 MG/1; MG/1
1 TABLET, FILM COATED ORAL EVERY 12 HOURS SCHEDULED
Qty: 6 TABLET | Refills: 0 | Status: SHIPPED | OUTPATIENT
Start: 2022-05-19 | End: 2022-05-19 | Stop reason: SDUPTHER

## 2022-05-19 RX ORDER — ATORVASTATIN CALCIUM 40 MG/1
40 TABLET, FILM COATED ORAL DAILY
Qty: 30 TABLET | Refills: 0 | Status: SHIPPED | OUTPATIENT
Start: 2022-05-19 | End: 2022-05-19 | Stop reason: SDUPTHER

## 2022-05-19 RX ORDER — AMOXICILLIN AND CLAVULANATE POTASSIUM 875; 125 MG/1; MG/1
1 TABLET, FILM COATED ORAL EVERY 12 HOURS SCHEDULED
Qty: 6 TABLET | Refills: 0 | Status: SHIPPED | OUTPATIENT
Start: 2022-05-19 | End: 2022-05-22

## 2022-05-19 RX ORDER — PREDNISONE 20 MG/1
40 TABLET ORAL
Qty: 6 TABLET | Refills: 0 | Status: SHIPPED | OUTPATIENT
Start: 2022-05-20 | End: 2022-05-19 | Stop reason: SDUPTHER

## 2022-05-19 RX ORDER — GUAIFENESIN/DEXTROMETHORPHAN 100-10MG/5
5 SYRUP ORAL EVERY 4 HOURS PRN
Qty: 354 ML | Refills: 0 | Status: SHIPPED | OUTPATIENT
Start: 2022-05-19 | End: 2022-05-19 | Stop reason: SDUPTHER

## 2022-05-19 RX ORDER — IPRATROPIUM BROMIDE AND ALBUTEROL SULFATE 2.5; .5 MG/3ML; MG/3ML
3 SOLUTION RESPIRATORY (INHALATION)
Qty: 360 ML | Refills: 1 | Status: SHIPPED | OUTPATIENT
Start: 2022-05-19

## 2022-05-19 RX ORDER — GUAIFENESIN 600 MG/1
1200 TABLET, EXTENDED RELEASE ORAL EVERY 12 HOURS SCHEDULED
Qty: 40 TABLET | Refills: 0 | Status: SHIPPED | OUTPATIENT
Start: 2022-05-19 | End: 2022-06-13

## 2022-05-19 RX ORDER — GUAIFENESIN/DEXTROMETHORPHAN 100-10MG/5
5 SYRUP ORAL EVERY 4 HOURS PRN
Qty: 354 ML | Refills: 0 | Status: SHIPPED | OUTPATIENT
Start: 2022-05-19 | End: 2022-06-13

## 2022-05-19 RX ORDER — ATORVASTATIN CALCIUM 40 MG/1
40 TABLET, FILM COATED ORAL DAILY
Qty: 30 TABLET | Refills: 0 | Status: SHIPPED | OUTPATIENT
Start: 2022-05-19 | End: 2022-06-18

## 2022-05-19 RX ORDER — PREDNISONE 20 MG/1
40 TABLET ORAL
Qty: 6 TABLET | Refills: 0 | Status: SHIPPED | OUTPATIENT
Start: 2022-05-20 | End: 2022-05-23

## 2022-05-19 RX ORDER — IPRATROPIUM BROMIDE AND ALBUTEROL SULFATE 2.5; .5 MG/3ML; MG/3ML
3 SOLUTION RESPIRATORY (INHALATION)
Qty: 360 ML | Refills: 1 | Status: SHIPPED | OUTPATIENT
Start: 2022-05-19 | End: 2022-05-19 | Stop reason: SDUPTHER

## 2022-05-19 RX ORDER — BENZONATATE 100 MG/1
100 CAPSULE ORAL 3 TIMES DAILY PRN
Qty: 20 CAPSULE | Refills: 0 | Status: SHIPPED | OUTPATIENT
Start: 2022-05-19 | End: 2022-05-19 | Stop reason: SDUPTHER

## 2022-05-19 RX ORDER — GUAIFENESIN 600 MG/1
1200 TABLET, EXTENDED RELEASE ORAL EVERY 12 HOURS SCHEDULED
Qty: 40 TABLET | Refills: 0 | Status: SHIPPED | OUTPATIENT
Start: 2022-05-19 | End: 2022-05-19 | Stop reason: SDUPTHER

## 2022-05-19 RX ORDER — DOXYCYCLINE 100 MG/1
100 CAPSULE ORAL EVERY 12 HOURS SCHEDULED
Qty: 5 CAPSULE | Refills: 0 | Status: SHIPPED | OUTPATIENT
Start: 2022-05-19 | End: 2022-05-19 | Stop reason: SDUPTHER

## 2022-05-19 RX ORDER — DOXYCYCLINE 100 MG/1
100 CAPSULE ORAL EVERY 12 HOURS SCHEDULED
Qty: 5 CAPSULE | Refills: 0 | Status: SHIPPED | OUTPATIENT
Start: 2022-05-19 | End: 2022-05-22

## 2022-05-19 RX ORDER — BENZONATATE 100 MG/1
100 CAPSULE ORAL 3 TIMES DAILY PRN
Qty: 20 CAPSULE | Refills: 0 | Status: SHIPPED | OUTPATIENT
Start: 2022-05-19 | End: 2022-09-02

## 2022-05-19 RX ADMIN — ASPIRIN 81 MG: 81 TABLET, COATED ORAL at 09:30

## 2022-05-19 RX ADMIN — FOLIC ACID 1 MG: 1 TABLET ORAL at 09:30

## 2022-05-19 RX ADMIN — FAMOTIDINE 20 MG: 20 TABLET, FILM COATED ORAL at 09:30

## 2022-05-19 RX ADMIN — IPRATROPIUM BROMIDE AND ALBUTEROL SULFATE 3 ML: .5; 3 SOLUTION RESPIRATORY (INHALATION) at 07:31

## 2022-05-19 RX ADMIN — Medication 10 ML: at 09:31

## 2022-05-19 RX ADMIN — BENZONATATE 100 MG: 100 CAPSULE, LIQUID FILLED ORAL at 09:31

## 2022-05-19 RX ADMIN — BUDESONIDE AND FORMOTEROL FUMARATE DIHYDRATE 2 PUFF: 160; 4.5 AEROSOL RESPIRATORY (INHALATION) at 07:30

## 2022-05-19 RX ADMIN — IPRATROPIUM BROMIDE AND ALBUTEROL SULFATE 3 ML: .5; 3 SOLUTION RESPIRATORY (INHALATION) at 00:53

## 2022-05-19 RX ADMIN — GUAIFENESIN 1200 MG: 600 TABLET, EXTENDED RELEASE ORAL at 09:31

## 2022-05-19 RX ADMIN — AMOXICILLIN AND CLAVULANATE POTASSIUM 1 TABLET: 875; 125 TABLET, FILM COATED ORAL at 09:31

## 2022-05-19 RX ADMIN — CLOPIDOGREL BISULFATE 75 MG: 75 TABLET, FILM COATED ORAL at 09:30

## 2022-05-19 RX ADMIN — PREDNISONE 40 MG: 20 TABLET ORAL at 09:30

## 2022-05-19 RX ADMIN — Medication 1 TABLET: at 09:30

## 2022-05-19 RX ADMIN — DOXYCYCLINE 100 MG: 100 CAPSULE ORAL at 09:31

## 2022-05-19 NOTE — OUTREACH NOTE
AMBULATORY CASE MANAGEMENT NOTE    Social Work Assessment  Questions/Answers    Flowsheet Row Most Recent Value   Plan HRCM Program created: Discharge report indicates patient discharged from [facility] to [facility].           SNF Follow-up    Questions/Answers    Flowsheet Row Responses   Acute Facility Discharged From [facility]   Acute Discharge Date 05/19/22   Name of the Skilled Nursing Facility? [facility]   Purpose of SNF Admission PT, SN, OT   Estimated length of stay for the patient? Undetermined   Who is the insurance provider or payor of patient stay? Medicare   Progression of Patient? New SNF admission following an acute admission (pneumonia)          NANDINI FRIEDMAN  Ambulatory Case Management    5/19/2022, 15:42 EDT

## 2022-05-20 ENCOUNTER — LAB (OUTPATIENT)
Dept: LAB | Facility: HOSPITAL | Age: 77
End: 2022-05-20

## 2022-05-20 DIAGNOSIS — Z01.818 PREOPERATIVE TESTING: ICD-10-CM

## 2022-05-20 DIAGNOSIS — M17.11 PRIMARY OSTEOARTHRITIS OF RIGHT KNEE: ICD-10-CM

## 2022-05-23 ENCOUNTER — LAB REQUISITION (OUTPATIENT)
Dept: LAB | Facility: HOSPITAL | Age: 77
End: 2022-05-23

## 2022-05-23 DIAGNOSIS — I10 ESSENTIAL (PRIMARY) HYPERTENSION: ICD-10-CM

## 2022-05-23 LAB
ANION GAP SERPL CALCULATED.3IONS-SCNC: 11.9 MMOL/L (ref 5–15)
BUN SERPL-MCNC: 12 MG/DL (ref 8–23)
BUN/CREAT SERPL: 14.3 (ref 7–25)
CALCIUM SPEC-SCNC: 9.3 MG/DL (ref 8.6–10.5)
CHLORIDE SERPL-SCNC: 99 MMOL/L (ref 98–107)
CO2 SERPL-SCNC: 24.1 MMOL/L (ref 22–29)
CREAT SERPL-MCNC: 0.84 MG/DL (ref 0.57–1)
EGFRCR SERPLBLD CKD-EPI 2021: 71.2 ML/MIN/1.73
GLUCOSE SERPL-MCNC: 92 MG/DL (ref 65–99)
POTASSIUM SERPL-SCNC: 5.2 MMOL/L (ref 3.5–5.2)
SODIUM SERPL-SCNC: 135 MMOL/L (ref 136–145)

## 2022-05-23 PROCEDURE — 80048 BASIC METABOLIC PNL TOTAL CA: CPT | Performed by: INTERNAL MEDICINE

## 2022-05-31 ENCOUNTER — OFFICE VISIT (OUTPATIENT)
Dept: SURGERY | Facility: CLINIC | Age: 77
End: 2022-05-31

## 2022-05-31 VITALS
HEIGHT: 65 IN | SYSTOLIC BLOOD PRESSURE: 140 MMHG | OXYGEN SATURATION: 97 % | BODY MASS INDEX: 33.99 KG/M2 | DIASTOLIC BLOOD PRESSURE: 66 MMHG | WEIGHT: 204 LBS | HEART RATE: 90 BPM

## 2022-05-31 DIAGNOSIS — R13.19 OTHER DYSPHAGIA: Primary | ICD-10-CM

## 2022-05-31 DIAGNOSIS — K21.9 GASTROESOPHAGEAL REFLUX DISEASE WITHOUT ESOPHAGITIS: ICD-10-CM

## 2022-05-31 PROCEDURE — 99203 OFFICE O/P NEW LOW 30 MIN: CPT

## 2022-05-31 NOTE — PROGRESS NOTES
Subjective   Raisa Hernández is a 77 y.o. female who presents today for Initial Evaluation    Chief Complaint:    Chief Complaint   Patient presents with   • EGD     CONSULT EGD/ NO PREF.         History of Present Illness:    History of Present Illness Raisa is a 77-year-old female who presents for an evaluation for an EGD.  She is currently at the [facility] for rehabilitation.  She reports that she was admitted to the hospital for pneumonia and strep recently.  She reports that over the past 2 weeks she has been unable to speak, today is the first day that she has been able to speak.  She also reports that she has had difficulty swallowing.  She reports that she has not been choked however she has fear that she would choke on food.  She does monitor what she eats.  She also reports an issue with reflux.  She states that she has been having to sleep with the head of her bed propped up.  She denies any vomiting, however, she has had nausea.  She does take aspirin and Plavix for stroke that she had in 2009.    The following portions of the patient's history were reviewed and updated as appropriate: allergies, current medications, past family history, past medical history, past social history, past surgical history and problem list.    Past Medical History:  Past Medical History:   Diagnosis Date   • Hyperlipidemia    • Hypertension 5/16/2016   • Stroke (HCC) 5/16/2016 2009 2015       Social History:  Social History     Socioeconomic History   • Marital status:    Tobacco Use   • Smoking status: Never Smoker   • Smokeless tobacco: Never Used   Vaping Use   • Vaping Use: Never used   Substance and Sexual Activity   • Alcohol use: No   • Drug use: No   • Sexual activity: Never       Family History:  Family History   Problem Relation Age of Onset   • Heart disease Mother    • Heart disease Father    • Cancer Brother    • Breast cancer Paternal Aunt    • Breast cancer Paternal Aunt        Past Surgical  History:  Past Surgical History:   Procedure Laterality Date   • CATARACT EXTRACTION     • CHOLECYSTECTOMY     • COLON SURGERY     • KNEE SURGERY Left        Problem List:  Patient Active Problem List   Diagnosis   • Hypertensive disorder   • Thrombotic stroke (HCC)   • Hyperlipidemia   • Osteoarthritis of knee   • Sleep apnea   • Status post total left knee replacement   • Angina pectoris (HCC)   • Premature atrial contraction   • Diplopia   • Cushingoid side effect of steroids (HCC)   • Leukocytosis   • Neuropathy   • Postmenopausal osteoporosis   • Pulmonary HTN (HCC)   • Squamous cell carcinoma of hand   • Vertical nystagmus   • Essential hypertension   • Pneumonia of right lung due to infectious organism, unspecified part of lung   • Hyponatremia   • Hypomagnesemia   • Chronic anemia   • Elevated CK   • Sepsis with acute respiratory failure without septic shock (HCC)   • Gastroesophageal reflux disease without esophagitis       Allergy:   Allergies   Allergen Reactions   • Morphine And Related Delirium        Current Medications:   Current Outpatient Medications   Medication Sig Dispense Refill   • acetaminophen (TYLENOL) 650 MG 8 hr tablet Take 650 mg by mouth 4 (Four) Times a Day.     • aspirin 81 MG EC tablet Take 1 tablet by mouth.     • atorvastatin (LIPITOR) 40 MG tablet Take 1 tablet by mouth Daily. 30 tablet 0   • benzonatate (TESSALON) 100 MG capsule Take 1 capsule by mouth 3 (Three) Times a Day As Needed for Cough. 20 capsule 0   • clopidogrel (PLAVIX) 75 MG tablet Take 75 mg by mouth Daily.     • cyanocobalamin 1000 MCG/ML injection Inject 1,000 mcg under the skin into the appropriate area as directed Every 30 (Thirty) Days. Middle of the month.     • Diclofenac Sodium (Pennsaid) 2 % solution Apply 1 application topically 2 (Two) Times a Day As Needed (pain).     • Diclofenac Sodium (VOLTAREN) 1 % gel gel Apply 4 g topically to the appropriate area as directed 2 (Two) Times a Day As Needed (Pain).      • famotidine (PEPCID) 20 MG tablet Take 20 mg by mouth 2 (Two) Times a Day.     • folic acid (FOLVITE) 1 MG tablet Take 1 mg by mouth daily.     • Glucosamine-Chondroitin (OSTEO BI-FLEX REGULAR STRENGTH PO) Take 1 tablet by mouth 2 (Two) Times a Day.     • guaiFENesin (MUCINEX) 600 MG 12 hr tablet Take 2 tablets by mouth Every 12 (Twelve) Hours. 40 tablet 0   • guaiFENesin-dextromethorphan (ROBITUSSIN DM) 100-10 MG/5ML syrup Take 5 mL by mouth Every 4 (Four) Hours As Needed for Cough. 354 mL 0   • ipratropium-albuterol (DUO-NEB) 0.5-2.5 mg/3 ml nebulizer Inhale 3 mL by nebulization 4 (Four) Times a Day. 360 mL 1   • lisinopril (PRINIVIL,ZESTRIL) 5 MG tablet Take 5 mg by mouth Daily.     • Lutein-Zeaxanthin 25-5 MG capsule Take 1 capsule by mouth 2 (Two) Times a Day.     • meclizine (ANTIVERT) 12.5 MG tablet Take 12.5 mg by mouth 3 (Three) Times a Day As Needed for Dizziness.     • multivitamin (THERAGRAN) tablet tablet Take 1 tablet by mouth 2 (Two) Times a Day.     • vitamin D (ERGOCALCIFEROL) 1.25 MG (61940 UT) capsule capsule Take 50,000 Units by mouth Every 7 (Seven) Days. Sundays.       No current facility-administered medications for this visit.       Review of Systems:    Review of Systems   Constitutional: Negative for activity change.   HENT: Positive for trouble swallowing.    Eyes: Negative for blurred vision.   Respiratory: Negative for shortness of breath.    Cardiovascular: Negative for chest pain.   Gastrointestinal: Positive for nausea and GERD.   Endocrine: Negative for cold intolerance.   Genitourinary: Negative for flank pain.   Musculoskeletal: Negative for arthralgias.   Skin: Negative for bruise.   Allergic/Immunologic: Negative for environmental allergies.   Neurological: Negative for speech difficulty.   Hematological: Negative for adenopathy.   Psychiatric/Behavioral: Negative for agitation.         Physical Exam:   Physical Exam  Constitutional:       Appearance: Normal appearance.  "  HENT:      Head: Normocephalic.      Right Ear: External ear normal.      Left Ear: External ear normal.      Nose: Nose normal.      Mouth/Throat:      Pharynx: Oropharynx is clear.   Eyes:      Pupils: Pupils are equal, round, and reactive to light.   Cardiovascular:      Rate and Rhythm: Normal rate and regular rhythm.      Pulses: Normal pulses.   Pulmonary:      Effort: Pulmonary effort is normal.   Abdominal:      General: Abdomen is flat. Bowel sounds are normal.      Palpations: Abdomen is soft.   Musculoskeletal:         General: Normal range of motion.      Cervical back: Normal range of motion.   Skin:     General: Skin is warm.      Capillary Refill: Capillary refill takes less than 2 seconds.   Neurological:      General: No focal deficit present.      Mental Status: She is alert and oriented to person, place, and time.   Psychiatric:         Mood and Affect: Mood normal.         Behavior: Behavior normal.         Vitals:  Blood pressure 140/66, pulse 90, height 165.1 cm (65\"), weight 92.5 kg (204 lb), SpO2 97 %.   Body mass index is 33.95 kg/m².          Assessment & Plan   Diagnoses and all orders for this visit:    1. Other dysphagia (Primary)  -     Case Request; Standing  -     COVID PRE-OP / PRE-PROCEDURE SCREENING ORDER (NO ISOLATION) - Swab, Nasopharynx; Future  -     Case Request    2. Gastroesophageal reflux disease without esophagitis    Other orders  -     Follow Anesthesia Guidelines / Protocol; Future  -     Provide NPO Instructions to Patient; Future  -     Chlorhexidine Skin Prep; Future    Patient will undergo an EGD with [facility] with Dr. Winters.  We will call with procedure date and time.  Patient verbalized understanding.  If unable to reach patient, her sister-in-law Nikole can be contacted at [phone].    Visit Diagnoses:    ICD-10-CM ICD-9-CM   1. Other dysphagia  R13.19 787.29   2. Gastroesophageal reflux disease without esophagitis  K21.9 530.81         MEDS ORDERED DURING " VISIT:  No orders of the defined types were placed in this encounter.      No follow-ups on file.             This document has been electronically signed by LACI Corea  May 31, 2022 12:01 EDT    Please note that portions of this note were completed with a voice recognition program.

## 2022-06-01 ENCOUNTER — PATIENT ROUNDING (BHMG ONLY) (OUTPATIENT)
Dept: SURGERY | Facility: CLINIC | Age: 77
End: 2022-06-01

## 2022-06-01 NOTE — PROGRESS NOTES
June 1, 2022    Hello, may I speak with Raisa Hernández?    My name is Nevaeh       I am  with [facility]  [address] [place] [address] [phone].    Before we get started may I verify your date of birth? 1945    I am calling to officially welcome you to our practice and ask about your recent visit. Is this a good time to talk? yes    Tell me about your visit with us. What things went well?  Yes, Matheus is very sweet        We're always looking for ways to make our patients' experiences even better. Do you have recommendations on ways we may improve?  no, not at this time     Overall were you satisfied with your first visit to our practice? yes       I appreciate you taking the time to speak with me today. Is there anything else I can do for you? no      Thank you, and have a great day.

## 2022-06-02 ENCOUNTER — LAB REQUISITION (OUTPATIENT)
Dept: LAB | Facility: HOSPITAL | Age: 77
End: 2022-06-02

## 2022-06-02 DIAGNOSIS — E87.1 HYPO-OSMOLALITY AND HYPONATREMIA: ICD-10-CM

## 2022-06-03 ENCOUNTER — LAB REQUISITION (OUTPATIENT)
Dept: LAB | Facility: HOSPITAL | Age: 77
End: 2022-06-03

## 2022-06-03 DIAGNOSIS — I10 ESSENTIAL (PRIMARY) HYPERTENSION: ICD-10-CM

## 2022-06-03 LAB
ANION GAP SERPL CALCULATED.3IONS-SCNC: 10.1 MMOL/L (ref 5–15)
BUN SERPL-MCNC: 7 MG/DL (ref 8–23)
BUN/CREAT SERPL: 8.8 (ref 7–25)
CALCIUM SPEC-SCNC: 8.8 MG/DL (ref 8.6–10.5)
CHLORIDE SERPL-SCNC: 107 MMOL/L (ref 98–107)
CO2 SERPL-SCNC: 21.9 MMOL/L (ref 22–29)
CREAT SERPL-MCNC: 0.8 MG/DL (ref 0.57–1)
EGFRCR SERPLBLD CKD-EPI 2021: 75.5 ML/MIN/1.73
GLUCOSE SERPL-MCNC: 86 MG/DL (ref 65–99)
POTASSIUM SERPL-SCNC: 4.9 MMOL/L (ref 3.5–5.2)
SODIUM SERPL-SCNC: 139 MMOL/L (ref 136–145)

## 2022-06-03 PROCEDURE — 80048 BASIC METABOLIC PNL TOTAL CA: CPT | Performed by: INTERNAL MEDICINE

## 2022-06-08 ENCOUNTER — TELEPHONE (OUTPATIENT)
Dept: SURGERY | Facility: CLINIC | Age: 77
End: 2022-06-08

## 2022-06-08 NOTE — TELEPHONE ENCOUNTER
PATIENT IS AWARE TO ARRIVE AT THE HOSPITAL FOR PROCEDURE ON June 14 @ 8:00 AND TO GET COVID TEST ON Urs[address] 10. SHE STATED SHE STOPPED TAKING HER PPI AND PLAVIX ON June 7.

## 2022-06-10 ENCOUNTER — LAB (OUTPATIENT)
Dept: LAB | Facility: HOSPITAL | Age: 77
End: 2022-06-10

## 2022-06-10 DIAGNOSIS — R13.19 OTHER DYSPHAGIA: ICD-10-CM

## 2022-06-10 LAB — SARS-COV-2 RNA PNL SPEC NAA+PROBE: NOT DETECTED

## 2022-06-10 PROCEDURE — U0004 COV-19 TEST NON-CDC HGH THRU: HCPCS

## 2022-06-10 PROCEDURE — U0005 INFEC AGEN DETEC AMPLI PROBE: HCPCS

## 2022-06-13 ENCOUNTER — ANESTHESIA EVENT (OUTPATIENT)
Dept: PERIOP | Facility: HOSPITAL | Age: 77
End: 2022-06-13

## 2022-06-14 ENCOUNTER — ANESTHESIA (OUTPATIENT)
Dept: PERIOP | Facility: HOSPITAL | Age: 77
End: 2022-06-14

## 2022-06-14 ENCOUNTER — HOSPITAL ENCOUNTER (OUTPATIENT)
Facility: HOSPITAL | Age: 77
Setting detail: HOSPITAL OUTPATIENT SURGERY
Discharge: HOME | End: 2022-06-14
Attending: SURGERY | Admitting: SURGERY

## 2022-06-14 VITALS
DIASTOLIC BLOOD PRESSURE: 70 MMHG | OXYGEN SATURATION: 94 % | HEIGHT: 66 IN | BODY MASS INDEX: 31.34 KG/M2 | HEART RATE: 81 BPM | RESPIRATION RATE: 18 BRPM | SYSTOLIC BLOOD PRESSURE: 119 MMHG | WEIGHT: 195 LBS | TEMPERATURE: 98.3 F

## 2022-06-14 DIAGNOSIS — K21.9 GASTROESOPHAGEAL REFLUX DISEASE WITHOUT ESOPHAGITIS: Primary | ICD-10-CM

## 2022-06-14 DIAGNOSIS — R13.19 OTHER DYSPHAGIA: ICD-10-CM

## 2022-06-14 PROBLEM — K22.2 LOWER ESOPHAGEAL RING (SCHATZKI): Status: ACTIVE | Noted: 2022-06-14

## 2022-06-14 PROBLEM — K44.9 HIATAL HERNIA: Status: ACTIVE | Noted: 2022-06-14

## 2022-06-14 PROCEDURE — C1726 CATH, BAL DIL, NON-VASCULAR: HCPCS | Performed by: SURGERY

## 2022-06-14 PROCEDURE — 25010000002 PROPOFOL 10 MG/ML EMULSION: Performed by: NURSE ANESTHETIST, CERTIFIED REGISTERED

## 2022-06-14 PROCEDURE — 88305 TISSUE EXAM BY PATHOLOGIST: CPT

## 2022-06-14 RX ORDER — SODIUM CHLORIDE, SODIUM LACTATE, POTASSIUM CHLORIDE, CALCIUM CHLORIDE 600; 310; 30; 20 MG/100ML; MG/100ML; MG/100ML; MG/100ML
125 INJECTION, SOLUTION INTRAVENOUS ONCE
Status: COMPLETED | OUTPATIENT
Start: 2022-06-14 | End: 2022-06-14

## 2022-06-14 RX ORDER — SODIUM CHLORIDE, SODIUM LACTATE, POTASSIUM CHLORIDE, CALCIUM CHLORIDE 600; 310; 30; 20 MG/100ML; MG/100ML; MG/100ML; MG/100ML
100 INJECTION, SOLUTION INTRAVENOUS ONCE AS NEEDED
Status: DISCONTINUED | OUTPATIENT
Start: 2022-06-14 | End: 2022-06-14 | Stop reason: HOSPADM

## 2022-06-14 RX ORDER — SODIUM CHLORIDE 0.9 % (FLUSH) 0.9 %
10 SYRINGE (ML) INJECTION EVERY 12 HOURS SCHEDULED
Status: DISCONTINUED | OUTPATIENT
Start: 2022-06-14 | End: 2022-06-14 | Stop reason: HOSPADM

## 2022-06-14 RX ORDER — PROPOFOL 10 MG/ML
VIAL (ML) INTRAVENOUS AS NEEDED
Status: DISCONTINUED | OUTPATIENT
Start: 2022-06-14 | End: 2022-06-14 | Stop reason: SURG

## 2022-06-14 RX ORDER — ONDANSETRON 2 MG/ML
4 INJECTION INTRAMUSCULAR; INTRAVENOUS AS NEEDED
Status: DISCONTINUED | OUTPATIENT
Start: 2022-06-14 | End: 2022-06-14 | Stop reason: HOSPADM

## 2022-06-14 RX ORDER — IPRATROPIUM BROMIDE AND ALBUTEROL SULFATE 2.5; .5 MG/3ML; MG/3ML
3 SOLUTION RESPIRATORY (INHALATION) ONCE AS NEEDED
Status: DISCONTINUED | OUTPATIENT
Start: 2022-06-14 | End: 2022-06-14 | Stop reason: HOSPADM

## 2022-06-14 RX ORDER — FENTANYL CITRATE 50 UG/ML
50 INJECTION, SOLUTION INTRAMUSCULAR; INTRAVENOUS
Status: DISCONTINUED | OUTPATIENT
Start: 2022-06-14 | End: 2022-06-14 | Stop reason: HOSPADM

## 2022-06-14 RX ORDER — OMEPRAZOLE 20 MG/1
20 CAPSULE, DELAYED RELEASE ORAL 2 TIMES DAILY
Qty: 60 CAPSULE | Refills: 1 | Status: SHIPPED | OUTPATIENT
Start: 2022-06-14

## 2022-06-14 RX ORDER — LIDOCAINE HYDROCHLORIDE 20 MG/ML
INJECTION, SOLUTION INFILTRATION; PERINEURAL AS NEEDED
Status: DISCONTINUED | OUTPATIENT
Start: 2022-06-14 | End: 2022-06-14 | Stop reason: SURG

## 2022-06-14 RX ORDER — SODIUM CHLORIDE 0.9 % (FLUSH) 0.9 %
10 SYRINGE (ML) INJECTION AS NEEDED
Status: DISCONTINUED | OUTPATIENT
Start: 2022-06-14 | End: 2022-06-14 | Stop reason: HOSPADM

## 2022-06-14 RX ORDER — MIDAZOLAM HYDROCHLORIDE 1 MG/ML
0.5 INJECTION INTRAMUSCULAR; INTRAVENOUS
Status: DISCONTINUED | OUTPATIENT
Start: 2022-06-14 | End: 2022-06-14 | Stop reason: HOSPADM

## 2022-06-14 RX ADMIN — SODIUM CHLORIDE, POTASSIUM CHLORIDE, SODIUM LACTATE AND CALCIUM CHLORIDE: 600; 310; 30; 20 INJECTION, SOLUTION INTRAVENOUS at 07:36

## 2022-06-14 RX ADMIN — PROPOFOL 100 MG: 10 INJECTION, EMULSION INTRAVENOUS at 07:40

## 2022-06-14 RX ADMIN — LIDOCAINE HYDROCHLORIDE 100 MG: 20 INJECTION, SOLUTION INFILTRATION; PERINEURAL at 07:40

## 2022-06-14 RX ADMIN — PROPOFOL 100 MG: 10 INJECTION, EMULSION INTRAVENOUS at 07:50

## 2022-06-14 RX ADMIN — PROPOFOL 100 MG: 10 INJECTION, EMULSION INTRAVENOUS at 07:45

## 2022-06-14 NOTE — ANESTHESIA POSTPROCEDURE EVALUATION
Patient: Raisa Hernández    Procedure Summary     Date: 06/14/22 Room / Location: [facility] OR  /  [facility] OR    Anesthesia Start: 0736 Anesthesia Stop: 0754    Procedure: ESOPHAGOGASTRODUODENOSCOPY WITH DILITATION  AND BRAVO (N/A Esophagus) Diagnosis:       Other dysphagia      (Other dysphagia [R13.19])    Surgeons: Robbie Winters MD Provider: Remington Pack MD    Anesthesia Type: general ASA Status: 3          Anesthesia Type: general    Vitals  Vitals Value Taken Time   /70 06/14/22 0825   Temp 98.3 °F (36.8 °C) 06/14/22 0755   Pulse 81 06/14/22 0825   Resp 18 06/14/22 0825   SpO2 94 % 06/14/22 0825           Post Anesthesia Care and Evaluation    Patient location during evaluation: PACU  Patient participation: complete - patient participated  Level of consciousness: awake  Pain score: 1  Pain management: adequate    Airway patency: patent  Anesthetic complications: No anesthetic complications  PONV Status: controlled  Cardiovascular status: acceptable and blood pressure returned to baseline  Respiratory status: acceptable and room air  Hydration status: acceptable    Comments: Patient comfortable with discharge at this time.

## 2022-06-14 NOTE — INTERVAL H&P NOTE
H&P reviewed.  The patient was examined and there are no changes to the H&P.  Patient has had acid reflux symptoms for the past year with associated dysphagia over the past month.  She is on Pepcid at baseline.  She has held her Plavix and Pepcid for this procedure.  She is yet to do her upper GI study  To endoscopy for EGD with Bravo pH probe

## 2022-06-14 NOTE — ANESTHESIA PREPROCEDURE EVALUATION
Anesthesia Evaluation     Patient summary reviewed and Nursing notes reviewed   no history of anesthetic complications:  NPO Solid Status: > 8 hours  NPO Liquid Status: > 8 hours           Airway   Mallampati: II  TM distance: >3 FB  Neck ROM: full  No difficulty expected  Dental - normal exam     Pulmonary - normal exam    breath sounds clear to auscultation  (+) pneumonia improving , asthma,sleep apnea,   Cardiovascular - normal exam    ECG reviewed  PT is on anticoagulation therapy  Rhythm: regular  Rate: normal    (+) hypertension, angina with exertion, hyperlipidemia,       Neuro/Psych  (+) CVA,    GI/Hepatic/Renal/Endo    (+)  GERD,      Musculoskeletal     Abdominal  - normal exam   Substance History - negative use     OB/GYN negative ob/gyn ROS         Other   arthritis,    history of cancer                  Anesthesia Plan    ASA 3     general   total IV anesthesia  intravenous induction     Anesthetic plan, risks, benefits, and alternatives have been provided, discussed and informed consent has been obtained with: patient.  Use of blood products discussed with patient  Consented to blood products.       CODE STATUS:

## 2022-06-15 LAB — REF LAB TEST METHOD: NORMAL

## 2022-06-24 ENCOUNTER — TELEPHONE (OUTPATIENT)
Dept: SURGERY | Facility: CLINIC | Age: 77
End: 2022-06-24

## 2022-06-30 ENCOUNTER — HOSPITAL ENCOUNTER (OUTPATIENT)
Dept: GENERAL RADIOLOGY | Facility: HOSPITAL | Age: 77
Discharge: HOME | End: 2022-06-30
Admitting: SURGERY

## 2022-06-30 DIAGNOSIS — R13.19 OTHER DYSPHAGIA: ICD-10-CM

## 2022-06-30 DIAGNOSIS — K21.9 GASTROESOPHAGEAL REFLUX DISEASE WITHOUT ESOPHAGITIS: ICD-10-CM

## 2022-06-30 PROCEDURE — 74246 X-RAY XM UPR GI TRC 2CNTRST: CPT

## 2022-06-30 PROCEDURE — 74246 X-RAY XM UPR GI TRC 2CNTRST: CPT | Performed by: RADIOLOGY

## 2022-07-05 ENCOUNTER — OFFICE VISIT (OUTPATIENT)
Dept: SURGERY | Facility: CLINIC | Age: 77
End: 2022-07-05

## 2022-07-05 VITALS — WEIGHT: 195 LBS | BODY MASS INDEX: 31.34 KG/M2 | HEIGHT: 66 IN

## 2022-07-05 DIAGNOSIS — K44.9 HIATAL HERNIA: Primary | ICD-10-CM

## 2022-07-05 DIAGNOSIS — K21.9 GASTROESOPHAGEAL REFLUX DISEASE WITHOUT ESOPHAGITIS: ICD-10-CM

## 2022-07-05 PROCEDURE — 99212 OFFICE O/P EST SF 10 MIN: CPT

## 2022-07-05 RX ORDER — OMEPRAZOLE 20 MG/1
20 CAPSULE, DELAYED RELEASE ORAL 2 TIMES DAILY PRN
Qty: 60 CAPSULE | Refills: 2 | Status: SHIPPED | OUTPATIENT
Start: 2022-07-05 | End: 2022-09-02

## 2022-07-05 NOTE — PROGRESS NOTES
Subjective   Raisa Hernández is a 77 y.o. female who presents today for Follow Up    Chief Complaint:    Chief Complaint   Patient presents with   • Follow-up        History of Present Illness:    History of Present Illness Raisa is a 77-year-old female who presents status post EGD.  Dr. Winters performed EGD on 6/14/2022.  Patient states that she has been doing well.  She reports that she sleeps with a wedge pillow several hours of the night and as needed that helps with her reflux.  She also states that she believes she does take Prilosec at home.  She denies any needs or concerns at this time.    The following portions of the patient's history were reviewed and updated as appropriate: allergies, current medications, past family history, past medical history, past social history, past surgical history and problem list.    Past Medical History:  Past Medical History:   Diagnosis Date   • Arthritis    • Asthma    • Cancer (HCC)    • Elevated cholesterol    • GERD (gastroesophageal reflux disease)    • Hyperlipidemia    • Hypertension 05/16/2016   • Sleep apnea    • Stroke (HCC) 05/16/2016 2009 2015       Social History:  Social History     Socioeconomic History   • Marital status:    Tobacco Use   • Smoking status: Never Smoker   • Smokeless tobacco: Never Used   Vaping Use   • Vaping Use: Never used   Substance and Sexual Activity   • Alcohol use: No   • Drug use: No   • Sexual activity: Never       Family History:  Family History   Problem Relation Age of Onset   • Heart disease Mother    • Heart disease Father    • Cancer Brother    • Breast cancer Paternal Aunt    • Breast cancer Paternal Aunt        Past Surgical History:  Past Surgical History:   Procedure Laterality Date   • BRAVO PROCEDURE N/A 6/14/2022    Procedure: ESOPHAGOGASTRODUODENOSCOPY WITH DILITATION  AND WHEELER;  Surgeon: Robbie Winters MD;  Location: [facility];  Service: Gastroenterology;  Laterality: N/A;  GE JUNCTION 37CM  BRAVO PLACED AT  31CM   • CATARACT EXTRACTION     • CHOLECYSTECTOMY     • COLON SURGERY     • KNEE SURGERY Left        Problem List:  Patient Active Problem List   Diagnosis   • Hypertensive disorder   • Thrombotic stroke (HCC)   • Hyperlipidemia   • Osteoarthritis of knee   • Sleep apnea   • Status post total left knee replacement   • Angina pectoris (HCC)   • Premature atrial contraction   • Diplopia   • Cushingoid side effect of steroids (HCC)   • Leukocytosis   • Neuropathy   • Postmenopausal osteoporosis   • Pulmonary HTN (HCC)   • Squamous cell carcinoma of hand   • Vertical nystagmus   • Essential hypertension   • Pneumonia of right lung due to infectious organism, unspecified part of lung   • Hyponatremia   • Hypomagnesemia   • Chronic anemia   • Elevated CK   • Sepsis with acute respiratory failure without septic shock (HCC)   • Gastroesophageal reflux disease without esophagitis   • Other dysphagia   • Hiatal hernia   • Lower esophageal ring ([place])       Allergy:   Allergies   Allergen Reactions   • Morphine And Related Delirium        Current Medications:   Current Outpatient Medications   Medication Sig Dispense Refill   • acetaminophen (TYLENOL) 650 MG 8 hr tablet Take 650 mg by mouth 4 (Four) Times a Day.     • aspirin 81 MG EC tablet Take 1 tablet by mouth.     • benzonatate (TESSALON) 100 MG capsule Take 1 capsule by mouth 3 (Three) Times a Day As Needed for Cough. 20 capsule 0   • clopidogrel (PLAVIX) 75 MG tablet Take 75 mg by mouth Daily.     • cyanocobalamin 1000 MCG/ML injection Inject 1,000 mcg under the skin into the appropriate area as directed Every 30 (Thirty) Days. Middle of the month.     • Diclofenac Sodium (Pennsaid) 2 % solution Apply 1 application topically 2 (Two) Times a Day As Needed (pain).     • Diclofenac Sodium (VOLTAREN) 1 % gel gel Apply 4 g topically to the appropriate area as directed 2 (Two) Times a Day As Needed (Pain).     • folic acid (FOLVITE) 1 MG tablet Take 1 mg by mouth daily.      • ipratropium-albuterol (DUO-NEB) 0.5-2.5 mg/3 ml nebulizer Inhale 3 mL by nebulization 4 (Four) Times a Day. 360 mL 1   • lisinopril (PRINIVIL,ZESTRIL) 5 MG tablet Take 5 mg by mouth Daily.     • Lutein-Zeaxanthin 25-5 MG capsule Take 1 capsule by mouth 2 (Two) Times a Day.     • meclizine (ANTIVERT) 12.5 MG tablet Take 12.5 mg by mouth 3 (Three) Times a Day As Needed for Dizziness.     • multivitamin (THERAGRAN) tablet tablet Take 1 tablet by mouth 2 (Two) Times a Day.     • omeprazole (priLOSEC) 20 MG capsule Take 1 capsule by mouth 2 (Two) Times a Day. 60 capsule 1   • vitamin D (ERGOCALCIFEROL) 1.25 MG (58843 UT) capsule capsule Take 50,000 Units by mouth Every 7 (Seven) Days. Sundays.     • omeprazole (priLOSEC) 20 MG capsule Take 1 capsule by mouth 2 (Two) Times a Day As Needed (as needed for reflux). 60 capsule 2     No current facility-administered medications for this visit.       Review of Systems:    Review of Systems   All other systems reviewed and are negative.      Physical Exam:   Physical Exam  Constitutional:       Appearance: Normal appearance.   HENT:      Head: Normocephalic.      Right Ear: External ear normal.      Left Ear: External ear normal.      Nose: Nose normal.      Mouth/Throat:      Mouth: Mucous membranes are moist.   Eyes:      Extraocular Movements: Extraocular movements intact.      Pupils: Pupils are equal, round, and reactive to light.   Pulmonary:      Effort: Pulmonary effort is normal.   Abdominal:      General: Abdomen is flat.   Musculoskeletal:         General: Normal range of motion.      Cervical back: Normal range of motion.   Skin:     General: Skin is warm.      Capillary Refill: Capillary refill takes less than 2 seconds.   Neurological:      General: No focal deficit present.      Mental Status: She is alert and oriented to person, place, and time.   Psychiatric:         Mood and Affect: Mood normal.         Behavior: Behavior normal.      well-nourished female  "in no acute distress.    Vitals:  Height 167.6 cm (66\"), weight 88.5 kg (195 lb).   Body mass index is 31.47 kg/m².     Lab Results:   Admission on 2022, Discharged on 2022   Component Date Value Ref Range Status   • Reference Lab Report 2022    Final                    Value:[facility]  [address] [place] [address]  Phone: [phone] or [phone]  Fax: [phone]  Remington Shah M.D., Medical Director    PATIENT NAME                                     LABORATORY NO.  [id number]   MAURICE, FAARACELI                                       [id number]  [id number]                                 AGE                    SEX   SSN              CLIENT REF #  [facility] JOVANNY                      77        1945      F     [id number]      [id number]    [address] [place] [address]                             REQUESTING M.D.           ATTENDING [place] [address]                           JULIA HARRIS  DATE COLLECTED            DATE RECEIVED          DATE REPORTED  2022                2022             06/15/2022    DIAGNOSIS:  ANTRUM, BIOPSY:  Minimal chronic gastritis  No intestinal metaplasia or dysplasia identified  No Helicobacter pylori-like organisms identified on routine histologic                           sections    CAM    CLINICAL HISTORY:  Other dysphagia    SPECIMENS RECEIVED:  ANTRUM, BIOPSY    MICROSCOPIC DESCRIPTION:  Tissue blocks are prepared and slides are examined microscopically. See  diagnosis for details.    Professional interpretation rendered by Chloe Molina M.D., F.C.A.P. at  [facility], [address] [place] [address].    GROSS DESCRIPTION:  Pieces: 1.  Aggregate dimensions: 0.7 x 0.2 x 0.2 cm.  Cassettes:  One, entirely  submitted.      REVIEWED, DIAGNOSED AND ELECTRONICALLY  SIGNED BY:    Chloe Molina M.D., F.C.A.P.  CPT CODES:  23807     Lab on 06/10/2022   Component Date Value Ref Range " Status   • COVID19 06/10/2022 Not Detected  Not Detected - Ref. Range Final   Lab Requisition on 06/03/2022   Component Date Value Ref Range Status   • Glucose 06/03/2022 86  65 - 99 mg/dL Final   • BUN 06/03/2022 7 (A) 8 - 23 mg/dL Final   • Creatinine 06/03/2022 0.80  0.57 - 1.00 mg/dL Final   • Sodium 06/03/2022 139  136 - 145 mmol/L Final   • Potassium 06/03/2022 4.9  3.5 - 5.2 mmol/L Final   • Chloride 06/03/2022 107  98 - 107 mmol/L Final   • CO2 06/03/2022 21.9 (A) 22.0 - 29.0 mmol/L Final   • Calcium 06/03/2022 8.8  8.6 - 10.5 mg/dL Final   • BUN/Creatinine Ratio 06/03/2022 8.8  7.0 - 25.0 Final   • Anion Gap 06/03/2022 10.1  5.0 - 15.0 mmol/L Final   • eGFR 06/03/2022 75.5  >60.0 mL/min/1.73 Final    National Kidney Foundation and American Society of Nephrology (ASN) Task Force recommended calculation based on the Chronic Kidney Disease Epidemiology Collaboration (CKD-EPI) equation refit without adjustment for race.   Lab Requisition on 05/23/2022   Component Date Value Ref Range Status   • Glucose 05/23/2022 92  65 - 99 mg/dL Final   • BUN 05/23/2022 12  8 - 23 mg/dL Final   • Creatinine 05/23/2022 0.84  0.57 - 1.00 mg/dL Final   • Sodium 05/23/2022 135 (A) 136 - 145 mmol/L Final   • Potassium 05/23/2022 5.2  3.5 - 5.2 mmol/L Final   • Chloride 05/23/2022 99  98 - 107 mmol/L Final   • CO2 05/23/2022 24.1  22.0 - 29.0 mmol/L Final   • Calcium 05/23/2022 9.3  8.6 - 10.5 mg/dL Final   • BUN/Creatinine Ratio 05/23/2022 14.3  7.0 - 25.0 Final   • Anion Gap 05/23/2022 11.9  5.0 - 15.0 mmol/L Final   • eGFR 05/23/2022 71.2  >60.0 mL/min/1.73 Final    National Kidney Foundation and American Society of Nephrology (ASN) Task Force recommended calculation based on the Chronic Kidney Disease Epidemiology Collaboration (CKD-EPI) equation refit without adjustment for race.   No results displayed because visit has over 200 results.          Imaging:   FL Upper GI Double Contrast  Narrative: FL UPPER GI DOUBLE-CONTRAST-      REASON FOR EXAM:  GERD,dysphagia, hiatal hernia; K21.9-Lnmurb-jtaroguumh  reflux disease without esophagitis; R13.19-Other dysphagia     Comparison: None.     One minute of fluoroscopic time was utilized and 14 images acquired.     The initial  film shows multiple vascular clips in the upper  abdomen and there are GI staples in the right upper quadrant as well.  The patient was able to swallow the barium mixture without difficulty.  There was no penetration or aspiration into the tracheobronchial tree.  The esophagus was normal in caliber; no esophageal strictures or mucosal  abnormalities were identified. The patient was noted to have tertiary  contractions in the distal half of the esophagus. There was an  intermittent sliding-type hiatal hernia that measured up to 4 cm. The  stomach revealed no evidence of mass or ulceration. The barium passed  freely through the pylorus into the duodenal. The duodenal bulb and  mucosal pattern to the duodenal C-loop were remarkable for a small  diverticulum near the ampulla.     Impression: Intermittent sliding-type hiatal hernia with  gastroesophageal reflux. This was associated with tertiary contractions  in the distal half of the esophagus. No esophageal strictures were  demonstrated. A small diverticulum is noted near the ampulla in the  descending duodenal C-loop.     This report was finalized on 6/30/2022 10:20 AM by Dr. Cholo Dueñas II, MD.           Assessment & Plan   Diagnoses and all orders for this visit:    1. Hiatal hernia (Primary)    2. Gastroesophageal reflux disease without esophagitis    Other orders  -     omeprazole (priLOSEC) 20 MG capsule; Take 1 capsule by mouth 2 (Two) Times a Day As Needed (as needed for reflux).  Dispense: 60 capsule; Refill: 2    Raisa is a 77-year-old female who presents status post EGD.  EGD revealed a medium size hiatal hernia.  Patient also had an esophagram which showed reflux.  We discussed pathology in office.  Patient  was prescribed Prilosec, she believes that she takes this at home but is unsure.  I have prescribed Prilosec to ensure that she has an active prescription.  Patient reports that she is doing well and will return on an as needed basis.    Visit Diagnoses:  No diagnosis found.      MEDS ORDERED DURING VISIT:  New Medications Ordered This Visit   Medications   • omeprazole (priLOSEC) 20 MG capsule     Sig: Take 1 capsule by mouth 2 (Two) Times a Day As Needed (as needed for reflux).     Dispense:  60 capsule     Refill:  2       No follow-ups on file.             This document has been electronically signed by LACI Corea  July 5, 2022 13:59 EDT    Please note that portions of this note were completed with a voice recognition program.

## 2022-07-07 ENCOUNTER — LAB (OUTPATIENT)
Dept: LAB | Facility: HOSPITAL | Age: 77
End: 2022-07-07

## 2022-07-07 ENCOUNTER — TRANSCRIBE ORDERS (OUTPATIENT)
Dept: ADMINISTRATIVE | Facility: HOSPITAL | Age: 77
End: 2022-07-07

## 2022-07-07 DIAGNOSIS — I10 HYPERTENSION, UNSPECIFIED TYPE: ICD-10-CM

## 2022-07-07 DIAGNOSIS — E87.1 HYPONATREMIA: ICD-10-CM

## 2022-07-07 DIAGNOSIS — E87.1 HYPONATREMIA: Primary | ICD-10-CM

## 2022-07-07 LAB
ALBUMIN SERPL-MCNC: 4.3 G/DL (ref 3.5–5.2)
ALBUMIN/GLOB SERPL: 1.9 G/DL
ALP SERPL-CCNC: 61 U/L (ref 39–117)
ALT SERPL W P-5'-P-CCNC: 12 U/L (ref 1–33)
ANION GAP SERPL CALCULATED.3IONS-SCNC: 12.1 MMOL/L (ref 5–15)
AST SERPL-CCNC: 23 U/L (ref 1–32)
BACTERIA UR QL AUTO: NORMAL /HPF
BILIRUB SERPL-MCNC: 0.6 MG/DL (ref 0–1.2)
BILIRUB UR QL STRIP: NEGATIVE
BUN SERPL-MCNC: 9 MG/DL (ref 8–23)
BUN/CREAT SERPL: 11.1 (ref 7–25)
CALCIUM SPEC-SCNC: 9.4 MG/DL (ref 8.6–10.5)
CHLORIDE SERPL-SCNC: 103 MMOL/L (ref 98–107)
CLARITY UR: CLEAR
CO2 SERPL-SCNC: 22.9 MMOL/L (ref 22–29)
COLOR UR: YELLOW
CREAT SERPL-MCNC: 0.81 MG/DL (ref 0.57–1)
CREAT UR-MCNC: 84.8 MG/DL
EGFRCR SERPLBLD CKD-EPI 2021: 74.4 ML/MIN/1.73
GLOBULIN UR ELPH-MCNC: 2.3 GM/DL
GLUCOSE SERPL-MCNC: 90 MG/DL (ref 65–99)
GLUCOSE UR STRIP-MCNC: NEGATIVE MG/DL
HGB UR QL STRIP.AUTO: NEGATIVE
HYALINE CASTS UR QL AUTO: NORMAL /LPF
KETONES UR QL STRIP: NEGATIVE
LEUKOCYTE ESTERASE UR QL STRIP.AUTO: ABNORMAL
NITRITE UR QL STRIP: NEGATIVE
PH UR STRIP.AUTO: 6.5 [PH] (ref 5–8)
POTASSIUM SERPL-SCNC: 4.1 MMOL/L (ref 3.5–5.2)
PROT ?TM UR-MCNC: 7.5 MG/DL
PROT SERPL-MCNC: 6.6 G/DL (ref 6–8.5)
PROT UR QL STRIP: NEGATIVE
PROT/CREAT UR: 88.4 MG/G CREA (ref 0–200)
RBC # UR STRIP: NORMAL /HPF
REF LAB TEST METHOD: NORMAL
SODIUM SERPL-SCNC: 138 MMOL/L (ref 136–145)
SP GR UR STRIP: 1.01 (ref 1–1.03)
SQUAMOUS #/AREA URNS HPF: NORMAL /HPF
UROBILINOGEN UR QL STRIP: ABNORMAL
WBC # UR STRIP: NORMAL /HPF

## 2022-07-07 PROCEDURE — 80053 COMPREHEN METABOLIC PANEL: CPT

## 2022-07-07 PROCEDURE — 82570 ASSAY OF URINE CREATININE: CPT

## 2022-07-07 PROCEDURE — 36415 COLL VENOUS BLD VENIPUNCTURE: CPT

## 2022-07-07 PROCEDURE — 84156 ASSAY OF PROTEIN URINE: CPT

## 2022-07-07 PROCEDURE — 81001 URINALYSIS AUTO W/SCOPE: CPT

## 2022-08-17 ENCOUNTER — PREP FOR SURGERY (OUTPATIENT)
Dept: OTHER | Facility: HOSPITAL | Age: 77
End: 2022-08-17

## 2022-08-17 ENCOUNTER — OFFICE VISIT (OUTPATIENT)
Dept: ORTHOPEDIC SURGERY | Facility: CLINIC | Age: 77
End: 2022-08-17

## 2022-08-17 VITALS
WEIGHT: 190.2 LBS | BODY MASS INDEX: 30.57 KG/M2 | HEIGHT: 66 IN | SYSTOLIC BLOOD PRESSURE: 146 MMHG | DIASTOLIC BLOOD PRESSURE: 69 MMHG | HEART RATE: 67 BPM

## 2022-08-17 DIAGNOSIS — Z01.818 PREOPERATIVE TESTING: ICD-10-CM

## 2022-08-17 DIAGNOSIS — M17.11 PRIMARY OSTEOARTHRITIS OF RIGHT KNEE: Primary | ICD-10-CM

## 2022-08-17 PROCEDURE — 99214 OFFICE O/P EST MOD 30 MIN: CPT | Performed by: ORTHOPAEDIC SURGERY

## 2022-08-17 ASSESSMENT — KOOS JR
KOOS JR SCORE: 39.625
KOOS JR SCORE: 19

## 2022-08-17 NOTE — PROGRESS NOTES
History and Physical      Patient: Raisa Hernández  YOB: 1945  Date of Encounter: 08/17/2022      Chief Complaint:   Chief Complaint   Patient presents with   • Right Knee - Osteoarthritis, Follow-up     Surgery update for a right total knee arthroplasty           HPI:   Raisa Hernández, 77 y.o. female, presents in follow-up with continued right knee pain and known advanced osteoarthritis.  She has been considering surgical treatment for her right knee however she recently was admitted to the hospital for pneumonia and was then referred to rehabilitation unit.  She is now home doing much better her breathing is much better.  She wishes to consider total knee replacement.  Has moderate to severe pain with simple ambulation.  She has had a stroke in the past she is anticoagulated with Plavix and aspirin.  She has previously been cleared for surgery..  Her medical history includes pulmonary hypertension, thrombotic stroke and pneumonia.      Active Problem List:  Patient Active Problem List   Diagnosis   • Hypertensive disorder   • Thrombotic stroke (HCC)   • Hyperlipidemia   • Osteoarthritis of knee   • Sleep apnea   • Status post total left knee replacement   • Angina pectoris ([facility])   • Premature atrial contraction   • Diplopia   • Cushingoid side effect of steroids (HCC)   • Leukocytosis   • Neuropathy   • Postmenopausal osteoporosis   • Pulmonary HTN (HCC)   • Squamous cell carcinoma of hand   • Vertical nystagmus   • Essential hypertension   • Pneumonia of right lung due to infectious organism, unspecified part of lung   • Hyponatremia   • Hypomagnesemia   • Chronic anemia   • Elevated CK   • Sepsis with acute respiratory failure without septic shock (HCC)   • Gastroesophageal reflux disease without esophagitis   • Other dysphagia   • Hiatal hernia   • Lower esophageal ring ([place]ki)           Past Medical History:  Past Medical History:   Diagnosis Date   • Arthritis    • Asthma    • Cancer (HCC)    • Elevated  cholesterol    • GERD (gastroesophageal reflux disease)    • Hyperlipidemia    • Hypertension 05/16/2016   • Sleep apnea    • Stroke (HCC) 05/16/2016 2009 2015           Past Surgical History:  Past Surgical History:   Procedure Laterality Date   • BRAVO PROCEDURE N/A 6/14/2022    Procedure: ESOPHAGOGASTRODUODENOSCOPY WITH DILITATION  AND WHEELER;  Surgeon: Robbie Winters MD;  Location: [facility];  Service: Gastroenterology;  Laterality: N/A;  GE JUNCTION 37CM  WHEELER PLACED AT 31CM   • CATARACT EXTRACTION     • CHOLECYSTECTOMY     • COLON SURGERY     • KNEE SURGERY Left            Family History:  Family History   Problem Relation Age of Onset   • Heart disease Mother    • Heart disease Father    • Cancer Brother    • Breast cancer Paternal Aunt    • Breast cancer Paternal Aunt            Social History:  Social History     Socioeconomic History   • Marital status:    Tobacco Use   • Smoking status: Never Smoker   • Smokeless tobacco: Never Used   Vaping Use   • Vaping Use: Never used   Substance and Sexual Activity   • Alcohol use: No   • Drug use: No   • Sexual activity: Never     Body mass index is 30.71 kg/m².        Medications:  Current Outpatient Medications   Medication Sig Dispense Refill   • acetaminophen (TYLENOL) 650 MG 8 hr tablet Take 650 mg by mouth 4 (Four) Times a Day.     • aspirin 81 MG EC tablet Take 1 tablet by mouth.     • clopidogrel (PLAVIX) 75 MG tablet Take 75 mg by mouth Daily.     • cyanocobalamin 1000 MCG/ML injection Inject 1,000 mcg under the skin into the appropriate area as directed Every 30 (Thirty) Days. Middle of the month.     • Diclofenac Sodium (Pennsaid) 2 % solution Apply 1 application topically 2 (Two) Times a Day As Needed (pain).     • Diclofenac Sodium (VOLTAREN) 1 % gel gel Apply 4 g topically to the appropriate area as directed 2 (Two) Times a Day As Needed (Pain).     • folic acid (FOLVITE) 1 MG tablet Take 1 mg by mouth daily.     • ipratropium-albuterol  (DUO-NEB) 0.5-2.5 mg/3 ml nebulizer Inhale 3 mL by nebulization 4 (Four) Times a Day. 360 mL 1   • lisinopril (PRINIVIL,ZESTRIL) 5 MG tablet Take 5 mg by mouth Daily.     • Lutein-Zeaxanthin 25-5 MG capsule Take 1 capsule by mouth 2 (Two) Times a Day.     • meclizine (ANTIVERT) 12.5 MG tablet Take 12.5 mg by mouth 3 (Three) Times a Day As Needed for Dizziness.     • multivitamin (THERAGRAN) tablet tablet Take 1 tablet by mouth 2 (Two) Times a Day.     • omeprazole (priLOSEC) 20 MG capsule Take 1 capsule by mouth 2 (Two) Times a Day As Needed (as needed for reflux). 60 capsule 2   • vitamin D (ERGOCALCIFEROL) 1.25 MG (32042 UT) capsule capsule Take 50,000 Units by mouth Every 7 (Seven) Days. Sundays.     • benzonatate (TESSALON) 100 MG capsule Take 1 capsule by mouth 3 (Three) Times a Day As Needed for Cough. 20 capsule 0   • omeprazole (priLOSEC) 20 MG capsule Take 1 capsule by mouth 2 (Two) Times a Day. 60 capsule 1     No current facility-administered medications for this visit.           Allergies:  Allergies   Allergen Reactions   • Morphine And Related Delirium           Review of Systems:   Review of Systems   Constitutional: Negative.    HENT: Negative.    Eyes: Negative.    Respiratory: Positive for shortness of breath.    Cardiovascular: Negative.    Gastrointestinal: Negative.    Endocrine: Negative.    Genitourinary: Negative.    Musculoskeletal: Positive for arthralgias, joint swelling and neck pain.   Skin: Negative.    Allergic/Immunologic: Negative.    Neurological: Negative.    Hematological: Bruises/bleeds easily.   Psychiatric/Behavioral: Negative.            Physical Exam:   Physical Exam  Vitals and nursing note reviewed.   Constitutional:       General: She is not in acute distress.     Appearance: She is not diaphoretic.   HENT:      Head: Normocephalic and atraumatic.      Right Ear: External ear normal.      Left Ear: External ear normal.   Eyes:      General:         Right eye: No discharge.  "        Left eye: No discharge.      Conjunctiva/sclera: Conjunctivae normal.   Cardiovascular:      Rate and Rhythm: Normal rate and regular rhythm.      Heart sounds: Normal heart sounds. No murmur heard.  Pulmonary:      Effort: Pulmonary effort is normal. No respiratory distress.      Breath sounds: Normal breath sounds. No wheezing.   Abdominal:      General: There is no distension.      Palpations: Abdomen is soft.      Tenderness: There is no guarding.   Musculoskeletal:      Cervical back: Normal range of motion and neck supple.   Skin:     General: Skin is warm and dry.      Capillary Refill: Capillary refill takes less than 2 seconds.   Neurological:      Mental Status: She is alert and oriented to person, place, and time.   Psychiatric:         Behavior: Behavior normal.         Thought Content: Thought content normal.         Judgment: Judgment normal.       GENERAL: 77 y.o. female, alert and oriented X 3 in no acute distress.   Visit Vitals  /69   Pulse 67   Ht 167.6 cm (65.98\")   Wt 86.3 kg (190 lb 3.2 oz)   BMI 30.71 kg/m²         Musculoskeletal:   Examination right knee reveals mild effusion and moderate medial joint line tenderness she demonstrates full extension with flexion to 130 degrees with no instability.  Neurovascular exam grossly intact.        Radiology/Labs:    INDICATION:   Right knee pain after a fall     COMPARISON:   5/11/2022     FINDINGS:  AP, lateral, view(s) of the right knee.  No fracture, dislocation, or effusion. Osteophyte formation is seen at the upper and lower patellar poles. There is moderately severe medial compartment narrowing with medial and lateral joint line osteophytes.  There is varus angulation across the knee. No osteochondral fragments are seen.  No foreign body.     IMPRESSION:  Moderately severe osteoarthritis especially in the medial compartment. No fracture.     Signer Name: Jose Butt MD   Signed: 5/15/2022 9:55 AM   Workstation Name: [id number] "    [facility]    Radiographs         Assessment & Plan:   77 y.o. female presents in follow-up with worsening right knee pain she wishes to proceed with proposed total knee arthroplasty.  We will hold aspirin 1 day preop hold Plavix 7 days preop continue her on aspirin postoperatively and begin Eliquis postoperatively.  Surgery is scheduled [facility] September 6, 2022.      ICD-10-CM ICD-9-CM   1. Primary osteoarthritis of right knee  M17.11 715.16           Cc:   Rodrigo Howe, DO              This document has been electronically signed by Howard Estrada MD   August 19, 2022 12:12 EDT

## 2022-09-02 ENCOUNTER — PRE-ADMISSION TESTING (OUTPATIENT)
Dept: PREADMISSION TESTING | Facility: HOSPITAL | Age: 77
End: 2022-09-02

## 2022-09-02 ENCOUNTER — LAB (OUTPATIENT)
Dept: LAB | Facility: HOSPITAL | Age: 77
End: 2022-09-02

## 2022-09-02 VITALS — HEIGHT: 66 IN | BODY MASS INDEX: 30.53 KG/M2 | WEIGHT: 190 LBS

## 2022-09-02 DIAGNOSIS — M17.11 PRIMARY OSTEOARTHRITIS OF RIGHT KNEE: ICD-10-CM

## 2022-09-02 DIAGNOSIS — Z01.818 PREOPERATIVE TESTING: ICD-10-CM

## 2022-09-02 LAB
MRSA DNA SPEC QL NAA+PROBE: NEGATIVE
S AUREUS DNA SPEC QL NAA+PROBE: NEGATIVE

## 2022-09-02 PROCEDURE — 87641 MR-STAPH DNA AMP PROBE: CPT

## 2022-09-02 PROCEDURE — 87640 STAPH A DNA AMP PROBE: CPT

## 2022-09-02 PROCEDURE — U0005 INFEC AGEN DETEC AMPLI PROBE: HCPCS

## 2022-09-02 PROCEDURE — U0004 COV-19 TEST NON-CDC HGH THRU: HCPCS

## 2022-09-02 PROCEDURE — C9803 HOPD COVID-19 SPEC COLLECT: HCPCS

## 2022-09-02 RX ORDER — ATORVASTATIN CALCIUM 20 MG/1
20 TABLET, FILM COATED ORAL NIGHTLY
COMMUNITY

## 2022-09-02 ASSESSMENT — KOOS JR
KOOS JR SCORE: 39.625
KOOS JR SCORE: 19

## 2022-09-04 ENCOUNTER — APPOINTMENT (OUTPATIENT)
Dept: LAB | Facility: HOSPITAL | Age: 77
End: 2022-09-04

## 2022-09-04 ENCOUNTER — LAB (OUTPATIENT)
Dept: LAB | Facility: HOSPITAL | Age: 77
End: 2022-09-04

## 2022-09-04 DIAGNOSIS — M17.11 PRIMARY OSTEOARTHRITIS OF RIGHT KNEE: ICD-10-CM

## 2022-09-04 DIAGNOSIS — Z01.818 PREOPERATIVE TESTING: ICD-10-CM

## 2022-09-04 LAB
ABO GROUP BLD: NORMAL
ANION GAP SERPL CALCULATED.3IONS-SCNC: 13.2 MMOL/L (ref 5–15)
BASOPHILS # BLD AUTO: 0.03 10*3/MM3 (ref 0–0.2)
BASOPHILS NFR BLD AUTO: 0.6 % (ref 0–1.5)
BLD GP AB SCN SERPL QL: NEGATIVE
BUN SERPL-MCNC: 11 MG/DL (ref 8–23)
BUN/CREAT SERPL: 11.1 (ref 7–25)
CALCIUM SPEC-SCNC: 10.2 MG/DL (ref 8.6–10.5)
CHLORIDE SERPL-SCNC: 102 MMOL/L (ref 98–107)
CO2 SERPL-SCNC: 25.8 MMOL/L (ref 22–29)
CREAT SERPL-MCNC: 0.99 MG/DL (ref 0.57–1)
DEPRECATED RDW RBC AUTO: 46.6 FL (ref 37–54)
EGFRCR SERPLBLD CKD-EPI 2021: 58.5 ML/MIN/1.73
EOSINOPHIL # BLD AUTO: 0.25 10*3/MM3 (ref 0–0.4)
EOSINOPHIL NFR BLD AUTO: 5.1 % (ref 0.3–6.2)
ERYTHROCYTE [DISTWIDTH] IN BLOOD BY AUTOMATED COUNT: 13.1 % (ref 12.3–15.4)
GLUCOSE SERPL-MCNC: 94 MG/DL (ref 65–99)
HCT VFR BLD AUTO: 39 % (ref 34–46.6)
HGB BLD-MCNC: 12.9 G/DL (ref 12–15.9)
IMM GRANULOCYTES # BLD AUTO: 0 10*3/MM3 (ref 0–0.05)
IMM GRANULOCYTES NFR BLD AUTO: 0 % (ref 0–0.5)
LYMPHOCYTES # BLD AUTO: 1.78 10*3/MM3 (ref 0.7–3.1)
LYMPHOCYTES NFR BLD AUTO: 36 % (ref 19.6–45.3)
MCH RBC QN AUTO: 32.3 PG (ref 26.6–33)
MCHC RBC AUTO-ENTMCNC: 33.1 G/DL (ref 31.5–35.7)
MCV RBC AUTO: 97.5 FL (ref 79–97)
MONOCYTES # BLD AUTO: 0.68 10*3/MM3 (ref 0.1–0.9)
MONOCYTES NFR BLD AUTO: 13.8 % (ref 5–12)
NEUTROPHILS NFR BLD AUTO: 2.2 10*3/MM3 (ref 1.7–7)
NEUTROPHILS NFR BLD AUTO: 44.5 % (ref 42.7–76)
NRBC BLD AUTO-RTO: 0 /100 WBC (ref 0–0.2)
PLATELET # BLD AUTO: 221 10*3/MM3 (ref 140–450)
PMV BLD AUTO: 10.1 FL (ref 6–12)
POTASSIUM SERPL-SCNC: 4.3 MMOL/L (ref 3.5–5.2)
RBC # BLD AUTO: 4 10*6/MM3 (ref 3.77–5.28)
RH BLD: NEGATIVE
SODIUM SERPL-SCNC: 141 MMOL/L (ref 136–145)
T&S EXPIRATION DATE: NORMAL
WBC NRBC COR # BLD: 4.94 10*3/MM3 (ref 3.4–10.8)

## 2022-09-04 PROCEDURE — 80048 BASIC METABOLIC PNL TOTAL CA: CPT

## 2022-09-04 PROCEDURE — C9803 HOPD COVID-19 SPEC COLLECT: HCPCS

## 2022-09-04 PROCEDURE — 86901 BLOOD TYPING SEROLOGIC RH(D): CPT

## 2022-09-04 PROCEDURE — 85025 COMPLETE CBC W/AUTO DIFF WBC: CPT

## 2022-09-04 PROCEDURE — 36415 COLL VENOUS BLD VENIPUNCTURE: CPT

## 2022-09-04 PROCEDURE — 86850 RBC ANTIBODY SCREEN: CPT

## 2022-09-04 PROCEDURE — 86900 BLOOD TYPING SEROLOGIC ABO: CPT

## 2022-09-05 LAB — SARS-COV-2 RNA PNL SPEC NAA+PROBE: NOT DETECTED

## 2022-09-06 ENCOUNTER — APPOINTMENT (OUTPATIENT)
Dept: GENERAL RADIOLOGY | Facility: HOSPITAL | Age: 77
End: 2022-09-06

## 2022-09-06 ENCOUNTER — ANESTHESIA EVENT (OUTPATIENT)
Dept: PERIOP | Facility: HOSPITAL | Age: 77
End: 2022-09-06

## 2022-09-06 ENCOUNTER — HOSPITAL ENCOUNTER (OUTPATIENT)
Facility: HOSPITAL | Age: 77
Discharge: NURSING, REHAB OR LONG-TERM CARE | End: 2022-09-08
Attending: ORTHOPAEDIC SURGERY | Admitting: ORTHOPAEDIC SURGERY

## 2022-09-06 ENCOUNTER — ANESTHESIA (OUTPATIENT)
Dept: PERIOP | Facility: HOSPITAL | Age: 77
End: 2022-09-06

## 2022-09-06 DIAGNOSIS — M17.11 PRIMARY OSTEOARTHRITIS OF RIGHT KNEE: ICD-10-CM

## 2022-09-06 DIAGNOSIS — Z96.651 S/P TOTAL KNEE ARTHROPLASTY, RIGHT: Primary | ICD-10-CM

## 2022-09-06 LAB
ABO GROUP BLD: NORMAL
ALBUMIN SERPL-MCNC: 3.31 G/DL (ref 3.5–5.2)
ALBUMIN/GLOB SERPL: 1.3 G/DL
ALP SERPL-CCNC: 59 U/L (ref 39–117)
ALT SERPL W P-5'-P-CCNC: 15 U/L (ref 1–33)
ANION GAP SERPL CALCULATED.3IONS-SCNC: 11.1 MMOL/L (ref 5–15)
AST SERPL-CCNC: 21 U/L (ref 1–32)
BILIRUB SERPL-MCNC: 0.3 MG/DL (ref 0–1.2)
BUN SERPL-MCNC: 9 MG/DL (ref 8–23)
BUN/CREAT SERPL: 9.9 (ref 7–25)
CALCIUM SPEC-SCNC: 9 MG/DL (ref 8.6–10.5)
CHLORIDE SERPL-SCNC: 105 MMOL/L (ref 98–107)
CO2 SERPL-SCNC: 20.9 MMOL/L (ref 22–29)
CREAT SERPL-MCNC: 0.91 MG/DL (ref 0.57–1)
EGFRCR SERPLBLD CKD-EPI 2021: 64.7 ML/MIN/1.73
GLOBULIN UR ELPH-MCNC: 2.5 GM/DL
GLUCOSE SERPL-MCNC: 120 MG/DL (ref 65–99)
MAGNESIUM SERPL-MCNC: 1.9 MG/DL (ref 1.6–2.4)
POTASSIUM SERPL-SCNC: 3.8 MMOL/L (ref 3.5–5.2)
PROT SERPL-MCNC: 5.8 G/DL (ref 6–8.5)
RH BLD: NEGATIVE
SODIUM SERPL-SCNC: 137 MMOL/L (ref 136–145)

## 2022-09-06 PROCEDURE — 86901 BLOOD TYPING SEROLOGIC RH(D): CPT

## 2022-09-06 PROCEDURE — 63710000001: Performed by: INTERNAL MEDICINE

## 2022-09-06 PROCEDURE — 25010000002 CEFAZOLIN PER 500 MG: Performed by: ORTHOPAEDIC SURGERY

## 2022-09-06 PROCEDURE — 94640 AIRWAY INHALATION TREATMENT: CPT

## 2022-09-06 PROCEDURE — 25010000002 MIDAZOLAM PER 1 MG: Performed by: ANESTHESIOLOGY

## 2022-09-06 PROCEDURE — A9270 NON-COVERED ITEM OR SERVICE: HCPCS

## 2022-09-06 PROCEDURE — 94799 UNLISTED PULMONARY SVC/PX: CPT

## 2022-09-06 PROCEDURE — 63710000001 MULTIVITAMIN TABLET

## 2022-09-06 PROCEDURE — 63710000001 ACETAMINOPHEN 500 MG TABLET: Performed by: INTERNAL MEDICINE

## 2022-09-06 PROCEDURE — 27447 TOTAL KNEE ARTHROPLASTY: CPT | Performed by: ORTHOPAEDIC SURGERY

## 2022-09-06 PROCEDURE — A9270 NON-COVERED ITEM OR SERVICE: HCPCS | Performed by: ORTHOPAEDIC SURGERY

## 2022-09-06 PROCEDURE — 25010000002 FENTANYL CITRATE (PF) 50 MCG/ML SOLUTION: Performed by: NURSE ANESTHETIST, CERTIFIED REGISTERED

## 2022-09-06 PROCEDURE — 73560 X-RAY EXAM OF KNEE 1 OR 2: CPT

## 2022-09-06 PROCEDURE — 63710000001 OXYCODONE-ACETAMINOPHEN 5-325 MG TABLET: Performed by: ORTHOPAEDIC SURGERY

## 2022-09-06 PROCEDURE — A9270 NON-COVERED ITEM OR SERVICE: HCPCS | Performed by: INTERNAL MEDICINE

## 2022-09-06 PROCEDURE — 25010000002 ONDANSETRON PER 1 MG: Performed by: NURSE ANESTHETIST, CERTIFIED REGISTERED

## 2022-09-06 PROCEDURE — G0378 HOSPITAL OBSERVATION PER HR: HCPCS

## 2022-09-06 PROCEDURE — 63710000001 ASPIRIN 81 MG TABLET DELAYED-RELEASE

## 2022-09-06 PROCEDURE — 25010000002 ROPIVACAINE PER 1 MG: Performed by: ANESTHESIOLOGY

## 2022-09-06 PROCEDURE — 73560 X-RAY EXAM OF KNEE 1 OR 2: CPT | Performed by: RADIOLOGY

## 2022-09-06 PROCEDURE — 63710000001 LISINOPRIL 2.5 MG TABLET: Performed by: INTERNAL MEDICINE

## 2022-09-06 PROCEDURE — L1830 KO IMMOB CANVAS LONG PRE OTS: HCPCS | Performed by: ORTHOPAEDIC SURGERY

## 2022-09-06 PROCEDURE — 63710000001 FOLIC ACID 1 MG TABLET

## 2022-09-06 PROCEDURE — 25010000002 KETOROLAC TROMETHAMINE PER 15 MG: Performed by: NURSE ANESTHETIST, CERTIFIED REGISTERED

## 2022-09-06 PROCEDURE — 25010000002 VANCOMYCIN 1 G RECONSTITUTED SOLUTION 1 EACH VIAL: Performed by: ORTHOPAEDIC SURGERY

## 2022-09-06 PROCEDURE — 25010000002 PROPOFOL 10 MG/ML EMULSION: Performed by: NURSE ANESTHETIST, CERTIFIED REGISTERED

## 2022-09-06 PROCEDURE — C1776 JOINT DEVICE (IMPLANTABLE): HCPCS | Performed by: ORTHOPAEDIC SURGERY

## 2022-09-06 PROCEDURE — 80053 COMPREHEN METABOLIC PANEL: CPT

## 2022-09-06 PROCEDURE — 25010000002 FENTANYL CITRATE (PF) 50 MCG/ML SOLUTION: Performed by: ANESTHESIOLOGY

## 2022-09-06 PROCEDURE — 99213 OFFICE O/P EST LOW 20 MIN: CPT

## 2022-09-06 PROCEDURE — 83735 ASSAY OF MAGNESIUM: CPT

## 2022-09-06 PROCEDURE — 25010000002 NEOSTIGMINE 10 MG/10ML SOLUTION: Performed by: NURSE ANESTHETIST, CERTIFIED REGISTERED

## 2022-09-06 PROCEDURE — 63710000001 ATORVASTATIN 20 MG TABLET

## 2022-09-06 PROCEDURE — C1713 ANCHOR/SCREW BN/BN,TIS/BN: HCPCS | Performed by: ORTHOPAEDIC SURGERY

## 2022-09-06 PROCEDURE — 86900 BLOOD TYPING SEROLOGIC ABO: CPT

## 2022-09-06 PROCEDURE — 0 BUPIVACAINE LIPOSOME 1.3 % SUSPENSION: Performed by: ORTHOPAEDIC SURGERY

## 2022-09-06 PROCEDURE — C9290 INJ, BUPIVACAINE LIPOSOME: HCPCS | Performed by: ORTHOPAEDIC SURGERY

## 2022-09-06 DEVICE — VIOLET ANTIBACTERIAL POLYDIOXANONE, KNOTLESS TISSUE CONTROL DEVICE
Type: IMPLANTABLE DEVICE | Site: KNEE | Status: FUNCTIONAL
Brand: STRATAFIX

## 2022-09-06 DEVICE — ATTUNE KNEE SYSTEM FEMORAL POSTERIOR STABILIZED SIZE 4 RIGHT CEMENTED
Type: IMPLANTABLE DEVICE | Site: KNEE | Status: FUNCTIONAL
Brand: ATTUNE

## 2022-09-06 DEVICE — ATTUNE KNEE SYSTEM TIBIAL BASE ROTATING PLATFORM SIZE 4 CEMENTED
Type: IMPLANTABLE DEVICE | Site: KNEE | Status: FUNCTIONAL
Brand: ATTUNE

## 2022-09-06 DEVICE — ATTUNE PATELLA MEDIALIZED DOME 35MM CEMENTED AOX
Type: IMPLANTABLE DEVICE | Site: KNEE | Status: FUNCTIONAL
Brand: ATTUNE

## 2022-09-06 DEVICE — TOTL KN ATTUNE DEPUY 9527038: Type: IMPLANTABLE DEVICE | Site: KNEE | Status: FUNCTIONAL

## 2022-09-06 DEVICE — ATTUNE KNEE SYSTEM TIBIAL INSERT ROTATING PLATFORM POSTERIOR STABILIZED 4 5MM AOX
Type: IMPLANTABLE DEVICE | Site: KNEE | Status: FUNCTIONAL
Brand: ATTUNE

## 2022-09-06 DEVICE — DEV CONTRL TISS STRATAFIX SPIRAL MNCRYL UD 3/0 PLS 60CM: Type: IMPLANTABLE DEVICE | Site: KNEE | Status: FUNCTIONAL

## 2022-09-06 DEVICE — SMARTSET HIGH PERFORMANCE MV MEDIUM VISCOSITY BONE CEMENT 40G
Type: IMPLANTABLE DEVICE | Site: KNEE | Status: FUNCTIONAL
Brand: SMARTSET

## 2022-09-06 RX ORDER — NEOSTIGMINE METHYLSULFATE 1 MG/ML
INJECTION, SOLUTION INTRAVENOUS AS NEEDED
Status: DISCONTINUED | OUTPATIENT
Start: 2022-09-06 | End: 2022-09-06 | Stop reason: SURG

## 2022-09-06 RX ORDER — PANTOPRAZOLE SODIUM 40 MG/1
40 TABLET, DELAYED RELEASE ORAL
Status: DISCONTINUED | OUTPATIENT
Start: 2022-09-07 | End: 2022-09-08 | Stop reason: HOSPADM

## 2022-09-06 RX ORDER — FOLIC ACID 1 MG/1
1 TABLET ORAL DAILY
Status: DISCONTINUED | OUTPATIENT
Start: 2022-09-06 | End: 2022-09-06 | Stop reason: SDUPTHER

## 2022-09-06 RX ORDER — DOCUSATE SODIUM 100 MG/1
100 CAPSULE, LIQUID FILLED ORAL 2 TIMES DAILY PRN
Status: DISCONTINUED | OUTPATIENT
Start: 2022-09-06 | End: 2022-09-08 | Stop reason: SDUPTHER

## 2022-09-06 RX ORDER — ASPIRIN 81 MG/1
81 TABLET ORAL DAILY
Status: DISCONTINUED | OUTPATIENT
Start: 2022-09-06 | End: 2022-09-08 | Stop reason: HOSPADM

## 2022-09-06 RX ORDER — ASPIRIN 81 MG/1
81 TABLET ORAL DAILY
Status: CANCELLED | OUTPATIENT
Start: 2022-09-06

## 2022-09-06 RX ORDER — SODIUM CHLORIDE, SODIUM LACTATE, POTASSIUM CHLORIDE, CALCIUM CHLORIDE 600; 310; 30; 20 MG/100ML; MG/100ML; MG/100ML; MG/100ML
125 INJECTION, SOLUTION INTRAVENOUS ONCE
Status: COMPLETED | OUTPATIENT
Start: 2022-09-06 | End: 2022-09-06

## 2022-09-06 RX ORDER — ATORVASTATIN CALCIUM 20 MG/1
20 TABLET, FILM COATED ORAL NIGHTLY
Status: DISCONTINUED | OUTPATIENT
Start: 2022-09-06 | End: 2022-09-08 | Stop reason: HOSPADM

## 2022-09-06 RX ORDER — SODIUM CHLORIDE 0.9 % (FLUSH) 0.9 %
10 SYRINGE (ML) INJECTION EVERY 12 HOURS SCHEDULED
Status: DISCONTINUED | OUTPATIENT
Start: 2022-09-06 | End: 2022-09-06 | Stop reason: HOSPADM

## 2022-09-06 RX ORDER — ROPIVACAINE HYDROCHLORIDE 5 MG/ML
INJECTION, SOLUTION EPIDURAL; INFILTRATION; PERINEURAL
Status: COMPLETED | OUTPATIENT
Start: 2022-09-06 | End: 2022-09-06

## 2022-09-06 RX ORDER — SODIUM CHLORIDE 0.9 % (FLUSH) 0.9 %
10 SYRINGE (ML) INJECTION AS NEEDED
Status: DISCONTINUED | OUTPATIENT
Start: 2022-09-06 | End: 2022-09-06 | Stop reason: HOSPADM

## 2022-09-06 RX ORDER — HYDROMORPHONE HYDROCHLORIDE 1 MG/ML
0.5 INJECTION, SOLUTION INTRAMUSCULAR; INTRAVENOUS; SUBCUTANEOUS
Status: DISCONTINUED | OUTPATIENT
Start: 2022-09-06 | End: 2022-09-08 | Stop reason: HOSPADM

## 2022-09-06 RX ORDER — ATORVASTATIN CALCIUM 20 MG/1
20 TABLET, FILM COATED ORAL NIGHTLY
Status: DISCONTINUED | OUTPATIENT
Start: 2022-09-06 | End: 2022-09-06 | Stop reason: SDUPTHER

## 2022-09-06 RX ORDER — ATORVASTATIN CALCIUM 20 MG/1
20 TABLET, FILM COATED ORAL NIGHTLY
Status: CANCELLED | OUTPATIENT
Start: 2022-09-06

## 2022-09-06 RX ORDER — OXYCODONE HYDROCHLORIDE AND ACETAMINOPHEN 5; 325 MG/1; MG/1
1 TABLET ORAL ONCE AS NEEDED
Status: DISCONTINUED | OUTPATIENT
Start: 2022-09-06 | End: 2022-09-06 | Stop reason: HOSPADM

## 2022-09-06 RX ORDER — SODIUM CHLORIDE 9 MG/ML
INJECTION, SOLUTION INTRAVENOUS AS NEEDED
Status: DISCONTINUED | OUTPATIENT
Start: 2022-09-06 | End: 2022-09-06 | Stop reason: HOSPADM

## 2022-09-06 RX ORDER — IPRATROPIUM BROMIDE AND ALBUTEROL SULFATE 2.5; .5 MG/3ML; MG/3ML
3 SOLUTION RESPIRATORY (INHALATION)
Status: CANCELLED | OUTPATIENT
Start: 2022-09-06

## 2022-09-06 RX ORDER — PANTOPRAZOLE SODIUM 40 MG/1
40 TABLET, DELAYED RELEASE ORAL EVERY MORNING
Refills: 1 | Status: CANCELLED | OUTPATIENT
Start: 2022-09-06

## 2022-09-06 RX ORDER — ONDANSETRON 4 MG/1
4 TABLET, FILM COATED ORAL EVERY 6 HOURS PRN
Status: DISCONTINUED | OUTPATIENT
Start: 2022-09-06 | End: 2022-09-08 | Stop reason: HOSPADM

## 2022-09-06 RX ORDER — FOLIC ACID 1 MG/1
1 TABLET ORAL DAILY
Status: CANCELLED | OUTPATIENT
Start: 2022-09-06

## 2022-09-06 RX ORDER — GLYCOPYRROLATE 0.2 MG/ML
INJECTION INTRAMUSCULAR; INTRAVENOUS AS NEEDED
Status: DISCONTINUED | OUTPATIENT
Start: 2022-09-06 | End: 2022-09-06 | Stop reason: SURG

## 2022-09-06 RX ORDER — IPRATROPIUM BROMIDE AND ALBUTEROL SULFATE 2.5; .5 MG/3ML; MG/3ML
3 SOLUTION RESPIRATORY (INHALATION)
Status: DISCONTINUED | OUTPATIENT
Start: 2022-09-06 | End: 2022-09-08 | Stop reason: HOSPADM

## 2022-09-06 RX ORDER — ROCURONIUM BROMIDE 10 MG/ML
INJECTION, SOLUTION INTRAVENOUS AS NEEDED
Status: DISCONTINUED | OUTPATIENT
Start: 2022-09-06 | End: 2022-09-06 | Stop reason: SURG

## 2022-09-06 RX ORDER — LISINOPRIL 2.5 MG/1
5 TABLET ORAL DAILY
Status: DISCONTINUED | OUTPATIENT
Start: 2022-09-06 | End: 2022-09-08 | Stop reason: HOSPADM

## 2022-09-06 RX ORDER — NALOXONE HCL 0.4 MG/ML
0.1 VIAL (ML) INJECTION
Status: DISCONTINUED | OUTPATIENT
Start: 2022-09-06 | End: 2022-09-08 | Stop reason: HOSPADM

## 2022-09-06 RX ORDER — DIPHENOXYLATE HYDROCHLORIDE AND ATROPINE SULFATE 2.5; .025 MG/1; MG/1
1 TABLET ORAL DAILY
Status: DISCONTINUED | OUTPATIENT
Start: 2022-09-06 | End: 2022-09-08 | Stop reason: HOSPADM

## 2022-09-06 RX ORDER — SODIUM CHLORIDE 0.9 % (FLUSH) 0.9 %
10 SYRINGE (ML) INJECTION EVERY 12 HOURS SCHEDULED
Status: DISCONTINUED | OUTPATIENT
Start: 2022-09-06 | End: 2022-09-08 | Stop reason: HOSPADM

## 2022-09-06 RX ORDER — MIDAZOLAM HYDROCHLORIDE 1 MG/ML
0.5 INJECTION INTRAMUSCULAR; INTRAVENOUS
Status: DISCONTINUED | OUTPATIENT
Start: 2022-09-06 | End: 2022-09-06 | Stop reason: HOSPADM

## 2022-09-06 RX ORDER — CEFAZOLIN SODIUM 2 G/50ML
2 SOLUTION INTRAVENOUS ONCE
Status: DISCONTINUED | OUTPATIENT
Start: 2022-09-06 | End: 2022-09-06

## 2022-09-06 RX ORDER — MECLIZINE HCL 12.5 MG/1
12.5 TABLET ORAL 3 TIMES DAILY PRN
Status: DISCONTINUED | OUTPATIENT
Start: 2022-09-06 | End: 2022-09-08 | Stop reason: HOSPADM

## 2022-09-06 RX ORDER — CARBOXYMETHYLCELLULOSE SODIUM 5 MG/ML
2 SOLUTION/ DROPS OPHTHALMIC 3 TIMES DAILY PRN
Status: DISCONTINUED | OUTPATIENT
Start: 2022-09-06 | End: 2022-09-08 | Stop reason: HOSPADM

## 2022-09-06 RX ORDER — FAMOTIDINE 10 MG/ML
INJECTION, SOLUTION INTRAVENOUS AS NEEDED
Status: DISCONTINUED | OUTPATIENT
Start: 2022-09-06 | End: 2022-09-06 | Stop reason: SURG

## 2022-09-06 RX ORDER — MIDAZOLAM HYDROCHLORIDE 1 MG/ML
0.5 INJECTION INTRAMUSCULAR; INTRAVENOUS
Status: DISCONTINUED | OUTPATIENT
Start: 2022-09-06 | End: 2022-09-08 | Stop reason: HOSPADM

## 2022-09-06 RX ORDER — PROPOFOL 10 MG/ML
VIAL (ML) INTRAVENOUS AS NEEDED
Status: DISCONTINUED | OUTPATIENT
Start: 2022-09-06 | End: 2022-09-06 | Stop reason: SURG

## 2022-09-06 RX ORDER — ONDANSETRON 2 MG/ML
INJECTION INTRAMUSCULAR; INTRAVENOUS AS NEEDED
Status: DISCONTINUED | OUTPATIENT
Start: 2022-09-06 | End: 2022-09-06 | Stop reason: SURG

## 2022-09-06 RX ORDER — CLOPIDOGREL BISULFATE 75 MG/1
75 TABLET ORAL DAILY
Status: CANCELLED | OUTPATIENT
Start: 2022-09-07

## 2022-09-06 RX ORDER — FENTANYL CITRATE 50 UG/ML
INJECTION, SOLUTION INTRAMUSCULAR; INTRAVENOUS AS NEEDED
Status: DISCONTINUED | OUTPATIENT
Start: 2022-09-06 | End: 2022-09-06 | Stop reason: SURG

## 2022-09-06 RX ORDER — DIPHENOXYLATE HYDROCHLORIDE AND ATROPINE SULFATE 2.5; .025 MG/1; MG/1
1 TABLET ORAL DAILY
Status: DISCONTINUED | OUTPATIENT
Start: 2022-09-06 | End: 2022-09-06 | Stop reason: SDUPTHER

## 2022-09-06 RX ORDER — SODIUM CHLORIDE, SODIUM LACTATE, POTASSIUM CHLORIDE, CALCIUM CHLORIDE 600; 310; 30; 20 MG/100ML; MG/100ML; MG/100ML; MG/100ML
100 INJECTION, SOLUTION INTRAVENOUS CONTINUOUS
Status: DISCONTINUED | OUTPATIENT
Start: 2022-09-06 | End: 2022-09-08

## 2022-09-06 RX ORDER — MECLIZINE HCL 12.5 MG/1
12.5 TABLET ORAL 3 TIMES DAILY PRN
Status: CANCELLED | OUTPATIENT
Start: 2022-09-06

## 2022-09-06 RX ORDER — LISINOPRIL 2.5 MG/1
5 TABLET ORAL DAILY
Status: CANCELLED | OUTPATIENT
Start: 2022-09-06

## 2022-09-06 RX ORDER — CARBOXYMETHYLCELLULOSE SODIUM 5 MG/ML
1 SOLUTION/ DROPS OPHTHALMIC 3 TIMES DAILY
Status: DISCONTINUED | OUTPATIENT
Start: 2022-09-06 | End: 2022-09-08 | Stop reason: HOSPADM

## 2022-09-06 RX ORDER — SODIUM CHLORIDE, SODIUM LACTATE, POTASSIUM CHLORIDE, CALCIUM CHLORIDE 600; 310; 30; 20 MG/100ML; MG/100ML; MG/100ML; MG/100ML
INJECTION, SOLUTION INTRAVENOUS CONTINUOUS PRN
Status: DISCONTINUED | OUTPATIENT
Start: 2022-09-06 | End: 2022-09-06 | Stop reason: SURG

## 2022-09-06 RX ORDER — CLOPIDOGREL BISULFATE 75 MG/1
75 TABLET ORAL DAILY
Status: CANCELLED | OUTPATIENT
Start: 2022-09-06

## 2022-09-06 RX ORDER — OXYCODONE HYDROCHLORIDE AND ACETAMINOPHEN 5; 325 MG/1; MG/1
2 TABLET ORAL EVERY 4 HOURS PRN
Status: DISCONTINUED | OUTPATIENT
Start: 2022-09-06 | End: 2022-09-08 | Stop reason: HOSPADM

## 2022-09-06 RX ORDER — SODIUM CHLORIDE, SODIUM LACTATE, POTASSIUM CHLORIDE, CALCIUM CHLORIDE 600; 310; 30; 20 MG/100ML; MG/100ML; MG/100ML; MG/100ML
125 INJECTION, SOLUTION INTRAVENOUS ONCE
Status: DISCONTINUED | OUTPATIENT
Start: 2022-09-06 | End: 2022-09-06 | Stop reason: HOSPADM

## 2022-09-06 RX ORDER — ACETAMINOPHEN 500 MG
1000 TABLET ORAL 2 TIMES DAILY PRN
Status: DISCONTINUED | OUTPATIENT
Start: 2022-09-06 | End: 2022-09-08 | Stop reason: HOSPADM

## 2022-09-06 RX ORDER — IPRATROPIUM BROMIDE AND ALBUTEROL SULFATE 2.5; .5 MG/3ML; MG/3ML
3 SOLUTION RESPIRATORY (INHALATION) ONCE AS NEEDED
Status: DISCONTINUED | OUTPATIENT
Start: 2022-09-06 | End: 2022-09-06 | Stop reason: HOSPADM

## 2022-09-06 RX ORDER — ONDANSETRON 2 MG/ML
4 INJECTION INTRAMUSCULAR; INTRAVENOUS EVERY 6 HOURS PRN
Status: DISCONTINUED | OUTPATIENT
Start: 2022-09-06 | End: 2022-09-08 | Stop reason: HOSPADM

## 2022-09-06 RX ORDER — SODIUM CHLORIDE 9 MG/ML
INJECTION INTRAVENOUS AS NEEDED
Status: DISCONTINUED | OUTPATIENT
Start: 2022-09-06 | End: 2022-09-06 | Stop reason: HOSPADM

## 2022-09-06 RX ORDER — LIDOCAINE HYDROCHLORIDE 20 MG/ML
INJECTION, SOLUTION INFILTRATION; PERINEURAL AS NEEDED
Status: DISCONTINUED | OUTPATIENT
Start: 2022-09-06 | End: 2022-09-06 | Stop reason: SURG

## 2022-09-06 RX ORDER — KETOROLAC TROMETHAMINE 30 MG/ML
15 INJECTION, SOLUTION INTRAMUSCULAR; INTRAVENOUS EVERY 6 HOURS PRN
Status: COMPLETED | OUTPATIENT
Start: 2022-09-06 | End: 2022-09-06

## 2022-09-06 RX ORDER — FOLIC ACID 1 MG/1
1 TABLET ORAL DAILY
Status: DISCONTINUED | OUTPATIENT
Start: 2022-09-06 | End: 2022-09-08 | Stop reason: HOSPADM

## 2022-09-06 RX ORDER — SODIUM CHLORIDE 0.9 % (FLUSH) 0.9 %
10 SYRINGE (ML) INJECTION AS NEEDED
Status: DISCONTINUED | OUTPATIENT
Start: 2022-09-06 | End: 2022-09-08 | Stop reason: HOSPADM

## 2022-09-06 RX ORDER — ONDANSETRON 2 MG/ML
4 INJECTION INTRAMUSCULAR; INTRAVENOUS AS NEEDED
Status: DISCONTINUED | OUTPATIENT
Start: 2022-09-06 | End: 2022-09-06 | Stop reason: HOSPADM

## 2022-09-06 RX ORDER — SODIUM CHLORIDE, SODIUM LACTATE, POTASSIUM CHLORIDE, CALCIUM CHLORIDE 600; 310; 30; 20 MG/100ML; MG/100ML; MG/100ML; MG/100ML
100 INJECTION, SOLUTION INTRAVENOUS ONCE AS NEEDED
Status: DISCONTINUED | OUTPATIENT
Start: 2022-09-06 | End: 2022-09-06 | Stop reason: HOSPADM

## 2022-09-06 RX ORDER — FENTANYL CITRATE 50 UG/ML
50 INJECTION, SOLUTION INTRAMUSCULAR; INTRAVENOUS
Status: DISCONTINUED | OUTPATIENT
Start: 2022-09-06 | End: 2022-09-06 | Stop reason: HOSPADM

## 2022-09-06 RX ORDER — DIPHENOXYLATE HYDROCHLORIDE AND ATROPINE SULFATE 2.5; .025 MG/1; MG/1
1 TABLET ORAL DAILY
Status: CANCELLED | OUTPATIENT
Start: 2022-09-06

## 2022-09-06 RX ADMIN — SODIUM CHLORIDE, POTASSIUM CHLORIDE, SODIUM LACTATE AND CALCIUM CHLORIDE 100 ML/HR: 600; 310; 30; 20 INJECTION, SOLUTION INTRAVENOUS at 14:17

## 2022-09-06 RX ADMIN — KETOROLAC TROMETHAMINE 15 MG: 30 INJECTION, SOLUTION INTRAMUSCULAR; INTRAVENOUS at 11:10

## 2022-09-06 RX ADMIN — GLYCOPYRROLATE 0.4 MG: 0.2 INJECTION, SOLUTION INTRAMUSCULAR; INTRAVENOUS at 10:00

## 2022-09-06 RX ADMIN — OXYCODONE HYDROCHLORIDE AND ACETAMINOPHEN 2 TABLET: 5; 325 TABLET ORAL at 16:46

## 2022-09-06 RX ADMIN — ACETAMINOPHEN 1000 MG: 500 TABLET ORAL at 20:53

## 2022-09-06 RX ADMIN — SODIUM CHLORIDE, POTASSIUM CHLORIDE, SODIUM LACTATE AND CALCIUM CHLORIDE 125 ML/HR: 600; 310; 30; 20 INJECTION, SOLUTION INTRAVENOUS at 13:26

## 2022-09-06 RX ADMIN — ROPIVACAINE HYDROCHLORIDE 20 MG: 5 INJECTION, SOLUTION EPIDURAL; INFILTRATION; PERINEURAL at 07:25

## 2022-09-06 RX ADMIN — ROCURONIUM BROMIDE 20 MG: 10 SOLUTION INTRAVENOUS at 07:36

## 2022-09-06 RX ADMIN — CEFAZOLIN 2 G: 2 INJECTION, POWDER, FOR SOLUTION INTRAMUSCULAR; INTRAVENOUS at 14:17

## 2022-09-06 RX ADMIN — CEFAZOLIN 2 G: 2 INJECTION, POWDER, FOR SOLUTION INTRAMUSCULAR; INTRAVENOUS at 07:31

## 2022-09-06 RX ADMIN — FENTANYL CITRATE 50 MCG: 50 INJECTION, SOLUTION INTRAMUSCULAR; INTRAVENOUS at 10:45

## 2022-09-06 RX ADMIN — NEOSTIGMINE 3 MG: 1 INJECTION INTRAVENOUS at 10:00

## 2022-09-06 RX ADMIN — FAMOTIDINE 20 MG: 10 INJECTION INTRAVENOUS at 07:36

## 2022-09-06 RX ADMIN — FENTANYL CITRATE 50 MCG: 50 INJECTION INTRAMUSCULAR; INTRAVENOUS at 07:58

## 2022-09-06 RX ADMIN — ATORVASTATIN CALCIUM 20 MG: 20 TABLET, FILM COATED ORAL at 20:51

## 2022-09-06 RX ADMIN — FENTANYL CITRATE 50 MCG: 50 INJECTION, SOLUTION INTRAMUSCULAR; INTRAVENOUS at 10:30

## 2022-09-06 RX ADMIN — FENTANYL CITRATE 50 MCG: 50 INJECTION INTRAMUSCULAR; INTRAVENOUS at 08:54

## 2022-09-06 RX ADMIN — TRANEXAMIC ACID 1000 MG: 100 INJECTION, SOLUTION INTRAVENOUS at 07:31

## 2022-09-06 RX ADMIN — SODIUM CHLORIDE, POTASSIUM CHLORIDE, SODIUM LACTATE AND CALCIUM CHLORIDE 100 ML/HR: 600; 310; 30; 20 INJECTION, SOLUTION INTRAVENOUS at 23:14

## 2022-09-06 RX ADMIN — TRANEXAMIC ACID 1000 MG: 100 INJECTION, SOLUTION INTRAVENOUS at 10:10

## 2022-09-06 RX ADMIN — Medication 1 MG: at 15:11

## 2022-09-06 RX ADMIN — ONDANSETRON 4 MG: 2 INJECTION INTRAMUSCULAR; INTRAVENOUS at 07:36

## 2022-09-06 RX ADMIN — FENTANYL CITRATE 50 MCG: 50 INJECTION INTRAMUSCULAR; INTRAVENOUS at 08:34

## 2022-09-06 RX ADMIN — LIDOCAINE HYDROCHLORIDE 60 MG: 20 INJECTION, SOLUTION INFILTRATION; PERINEURAL at 07:36

## 2022-09-06 RX ADMIN — CARBOXYMETHYLCELLULOSE SODIUM 1 DROP: 5 SOLUTION/ DROPS OPHTHALMIC at 20:51

## 2022-09-06 RX ADMIN — Medication 1 TABLET: at 15:11

## 2022-09-06 RX ADMIN — FENTANYL CITRATE 100 MCG: 50 INJECTION INTRAMUSCULAR; INTRAVENOUS at 07:24

## 2022-09-06 RX ADMIN — SODIUM CHLORIDE, POTASSIUM CHLORIDE, SODIUM LACTATE AND CALCIUM CHLORIDE: 600; 310; 30; 20 INJECTION, SOLUTION INTRAVENOUS at 08:57

## 2022-09-06 RX ADMIN — PROPOFOL 120 MG: 10 INJECTION, EMULSION INTRAVENOUS at 07:36

## 2022-09-06 RX ADMIN — Medication 10 ML: at 14:17

## 2022-09-06 RX ADMIN — FENTANYL CITRATE 50 MCG: 50 INJECTION INTRAMUSCULAR; INTRAVENOUS at 07:36

## 2022-09-06 RX ADMIN — CEFAZOLIN 2 G: 2 INJECTION, POWDER, FOR SOLUTION INTRAMUSCULAR; INTRAVENOUS at 23:14

## 2022-09-06 RX ADMIN — LISINOPRIL 5 MG: 2.5 TABLET ORAL at 20:51

## 2022-09-06 RX ADMIN — SODIUM CHLORIDE, POTASSIUM CHLORIDE, SODIUM LACTATE AND CALCIUM CHLORIDE: 600; 310; 30; 20 INJECTION, SOLUTION INTRAVENOUS at 07:31

## 2022-09-06 RX ADMIN — IPRATROPIUM BROMIDE AND ALBUTEROL SULFATE 3 ML: .5; 3 SOLUTION RESPIRATORY (INHALATION) at 20:13

## 2022-09-06 RX ADMIN — ASPIRIN 81 MG: 81 TABLET, COATED ORAL at 15:12

## 2022-09-06 RX ADMIN — MIDAZOLAM 2 MG: 1 INJECTION INTRAMUSCULAR; INTRAVENOUS at 07:22

## 2022-09-06 NOTE — CONSULTS
[facility] Medicine Services  CONSULT NOTE    Inpatient Hospitalist Consult  Consult performed by: Alejandra Gonzalez APRN  Consult ordered by: Howard Estrada MD          Patient Identification:  Name:  Raisa Hernández  Age:  77 y.o.  Sex:  female  :  1945  MRN:  [id number]  Visit Number:  [id number]  Primary care provider:  Rodrigo Howe DO    Subjective       History of presenting illness:    Raisa Hernández is a 77 y.o. female admitted by Orthopedic Surgery on 2022 s/p right TKA with known medical history of arthritis, previous colon cancer, GERD, hyperlipidemia, essential hypertension, obstructive sleep apnea, obesity by BMI, and previous CVAs (, ).  Raisa Hernández is being evaluated by the Hospital Medicine Service at the request of Howard Spears MD for medical management.     Procedures/Imaging:   · Right TKA by Dr. Estrada, 22  · Xray, right knee    On exam, patient is awake, alert, oriented, and pleasant. Denies any pain in the RLE at this time. Denies chest pain, palpitations, abdominal pain, nausea, vomiting, dysuria, flank pain, and chills. Does report some irritation in left eye postoperatively. Sclera appears reddened without drainage, hemorrhage, or obvious laceration. Patient denies loss of vision in left eye. Reports that she uses 2 L nasal cannula as needed at home; currently stable on 2 L. States that she has had previous left TKA with Dr. Estrada approximately 5 years ago. Denies numbness or tingling in RLE. Knee brace intact. Cap refill < 3 seconds. SCD in place to left leg. Denies any further concerns or questions at the moment.     [facility]  Discussed with Mariah Lemos DO.  ---------------------------------------------------------------------------------------------------------------------  Review of Systems   Constitutional: Negative for appetite change, chills, fatigue and fever.   HENT:  Negative for congestion and trouble swallowing.    Respiratory: Negative for choking, shortness of breath and wheezing.    Cardiovascular: Negative for chest pain, palpitations and leg swelling.   Gastrointestinal: Negative for abdominal distention and abdominal pain.   Genitourinary: Negative for difficulty urinating, dysuria and flank pain.   Musculoskeletal: Positive for arthralgias and gait problem. Negative for neck pain and neck stiffness.   Skin: Negative for rash and wound.   Neurological: Negative for dizziness, tremors, seizures, syncope, facial asymmetry, speech difficulty, light-headedness, numbness and headaches.      Otherwise 10-system ROS reviewed and is negative except as mentioned in the HPI.  ---------------------------------------------------------------------------------------------------------------------   Past History:  Family History   Problem Relation Age of Onset   • Heart disease Mother    • Heart disease Father    • Cancer Brother    • Breast cancer Paternal Aunt    • Breast cancer Paternal Aunt      Past Medical History:   Diagnosis Date   • Arthritis    • Asthma    • Cancer (HCC)     colon   • Elevated cholesterol    • GERD (gastroesophageal reflux disease)    • Hyperlipidemia    • Hypertension 05/16/2016   • Sleep apnea    • Stroke (HCC) 05/16/2016 2009 2015     Past Surgical History:   Procedure Laterality Date   • BRAVO PROCEDURE N/A 06/14/2022    Procedure: ESOPHAGOGASTRODUODENOSCOPY WITH DILITATION  AND WHEELER;  Surgeon: Robbie Winters MD;  Location: [facility];  Service: Gastroenterology;  Laterality: N/A;  GE JUNCTION 37CM  WHEELER PLACED AT 31CM   • CATARACT EXTRACTION Bilateral    • CHOLECYSTECTOMY     • COLON SURGERY     • KNEE SURGERY Left     arthroplasty     Social History     Socioeconomic History   • Marital status:    Tobacco Use   • Smoking status: Never Smoker   • Smokeless tobacco: Never Used   Vaping Use   • Vaping Use: Never used   Substance and Sexual  Activity   • Alcohol use: No   • Drug use: No   • Sexual activity: Never     ---------------------------------------------------------------------------------------------------------------------   Allergies:  Morphine and related  ---------------------------------------------------------------------------------------------------------------------   Prior to Admission Medications     Prescriptions Last Dose Informant Patient Reported? Taking?    acetaminophen (TYLENOL) 650 MG 8 hr tablet 9/5/2022 Self Yes Yes    Take 1,300 mg by mouth 2 (Two) Times a Day As Needed for Mild Pain or Moderate Pain.    aspirin 81 MG EC tablet Past Week Self Yes Yes    Take 81 mg by mouth Daily.    atorvastatin (LIPITOR) 20 MG tablet 9/5/2022 Self Yes Yes    Take 20 mg by mouth Every Night.    clopidogrel (PLAVIX) 75 MG tablet Past Week Self Yes Yes    Take 75 mg by mouth Daily.    folic acid (FOLVITE) 1 MG tablet 9/5/2022 Self Yes Yes    Take 1 mg by mouth daily.    ipratropium-albuterol (DUO-NEB) 0.5-2.5 mg/3 ml nebulizer Past Week Self No Yes    Inhale 3 mL by nebulization 4 (Four) Times a Day.    lisinopril (PRINIVIL,ZESTRIL) 5 MG tablet 9/5/2022 Self Yes Yes    Take 5 mg by mouth Daily.    meclizine (ANTIVERT) 12.5 MG tablet 9/5/2022 Self Yes Yes    Take 12.5 mg by mouth 3 (Three) Times a Day As Needed for Dizziness.    multivitamin (THERAGRAN) tablet tablet 9/5/2022 Self Yes Yes    Take 1 tablet by mouth Daily.    omeprazole (priLOSEC) 20 MG capsule 9/5/2022 Self No Yes    Take 1 capsule by mouth 2 (Two) Times a Day.    cyanocobalamin 1000 MCG/ML injection 8/15/2022 Self Yes No    Inject 1,000 mcg under the skin into the appropriate area as directed Every 30 (Thirty) Days.    vitamin D (ERGOCALCIFEROL) 1.25 MG (73838 UT) capsule capsule 8/28/2022 Self Yes No    Take 50,000 Units by mouth Every 7 (Seven) Days. Sundays.        Hospital Meds:  [START ON 9/7/2022] apixaban, 2.5 mg, Oral, Q12H  ceFAZolin, 2 g, Intravenous, Q8H  lactated  ringers, 125 mL/hr, Intravenous, Once  sodium chloride, 10 mL, Intravenous, Q12H      lactated ringers, 100 mL/hr      ---------------------------------------------------------------------------------------------------------------------     Objective     Vital Signs:  Temp:  [97.3 °F (36.3 °C)-98.5 °F (36.9 °C)] 97.3 °F (36.3 °C)  Heart Rate:  [53-79] 68  Resp:  [12-20] 14  BP: (132-169)/(69-80) 168/79      09/06/22  0644   Weight: 84.8 kg (187 lb)     Body mass index is 30.18 kg/m².  ---------------------------------------------------------------------------------------------------------------------     Physical Exam  Vitals reviewed.   Constitutional:       General: She is awake.      Appearance: She is obese.      Interventions: Nasal cannula in place.      Comments: Currently on 2 L nasal cannula.    HENT:      Head: Normocephalic and atraumatic.   Eyes:      Extraocular Movements: Extraocular movements intact.      Conjunctiva/sclera:      Left eye: Left conjunctiva is injected.      Comments: Left eye with scleral redness; patient reports irritation and pruritis. No drainage, hemorrhage, or other physical abnormality seen on exam.   Cardiovascular:      Rate and Rhythm: Normal rate and regular rhythm.      Pulses: Normal pulses.           Radial pulses are 2+ on the right side and 2+ on the left side.        Dorsalis pedis pulses are 2+ on the right side and 2+ on the left side.      Heart sounds: Normal heart sounds. No murmur heard.    No friction rub.   Pulmonary:      Effort: Pulmonary effort is normal. No accessory muscle usage, respiratory distress or retractions.      Breath sounds: Normal breath sounds. No wheezing or rhonchi.   Abdominal:      General: Bowel sounds are normal. There is no distension.      Palpations: Abdomen is soft.      Tenderness: There is no abdominal tenderness. There is no guarding.   Musculoskeletal:      Cervical back: Normal range of motion and neck supple. No rigidity.       Right lower leg: No edema.      Left lower leg: No edema.   Skin:     General: Skin is warm and dry.      Capillary Refill: Capillary refill takes less than 2 seconds.   Neurological:      Mental Status: She is alert and oriented to person, place, and time.   Psychiatric:         Mood and Affect: Mood normal.         Behavior: Behavior is cooperative.         ---------------------------------------------------------------------------------------------------------------------   EKG: Will order EKG for baseline.   ---------------------------------------------------------------------------------------------------------------------   Results from last 7 days   Lab Units 09/04/22  0942   WBC 10*3/mm3 4.94   HEMOGLOBIN g/dL 12.9   HEMATOCRIT % 39.0   MCV fL 97.5*   MCHC g/dL 33.1   PLATELETS 10*3/mm3 221         Results from last 7 days   Lab Units 09/04/22  0942   SODIUM mmol/L 141   POTASSIUM mmol/L 4.3   CHLORIDE mmol/L 102   CO2 mmol/L 25.8   BUN mg/dL 11   CREATININE mg/dL 0.99   CALCIUM mg/dL 10.2   GLUCOSE mg/dL 94   Estimated Creatinine Clearance: 51.4 mL/min (by C-G formula based on SCr of 0.99 mg/dL).  No results found for: AMMONIA          Lab Results   Component Value Date    HGBA1C 5.4 06/01/2016     Lab Results   Component Value Date    TSH 0.114 (L) 05/15/2022    FREET4 0.93 05/15/2022     No results found for: PREGTESTUR, PREGSERUM, HCG, HCGQUANT  Pain Management Panel     Pain Management Panel Latest Ref Rng & Units 7/7/2022    CREATININE UR mg/dL 84.8            ---------------------------------------------------------------------------------------------------------------------   Imaging Results (Last 7 Days)     Procedure Component Value Units Date/Time    XR Knee 1 or 2 View Right [[id number]] Collected: 09/06/22 1136     Updated: 09/06/22 1138    Narrative:      EXAM:    XR Right Knee, 1 or 2 Views     EXAM DATE:    9/6/2022 11:11 AM     CLINICAL HISTORY:    Post-Op Knee Arthoplasty; M17.11-Unilateral  primary osteoarthritis,  right knee     TECHNIQUE:    Frontal and/or lateral views of the right knee.     COMPARISON:    No relevant prior studies available.     FINDINGS:    Bones/joints:  Total right knee arthroplasty is noted.  No acute  fracture.  No dislocation.    Soft tissues:  Postsurgical changes within the soft tissues.    Other findings:  Anatomic alignment.       Impression:        Status post total right knee arthroplasty. Anatomic alignment noted.     This report was finalized on 9/6/2022 11:36 AM by Dr. Wilbert Hidalgo MD.             I have personally reviewed the radiology images and read the final radiology report.        Assessment / Plan     Assessment and Plan:  -Osteoarthritis s/p right TKA (9/6/2022)  -Previous left TKA   -Management and plan per primary.  -Eliquis initiated postoperatively for VTE prophylaxis.   -Continuous IV hydration.   -IV and PO analgesics as needed for pain; continuous pulse oximetry.  -PT consult placed.   -Currently WBAT.   -Incentive spirometer use encouraged.    -Essential hypertension  -BP appears moderately controlled with SBP ~150-160's.   -Plan to resume home antihypertensive regimen once reconciled per pharmacy with appropriate holding parameters to prevent hypotension and/or bradycardia.   -Closely monitor BP per hospital protocol, titrate medications as necessary.    -Hyperlipidemia  -Obtain fasting lipid panel in the a.m.  -Resume home statin.     -Chronic normocytic anemia  -Currently stable.  -Hgb 12.9/Hct 39.0.   -Expect some decrease in Hgb postoperatively with continuous IV fluids as well.   -Closely monitor with repeat CBC in the a.m.  -Plan to transfuse if Hgb < 7.   -Continue daily multivitamin and folic acid supplementation.     -Previous strokes (2009, 2016) with chronic right-sided weakness, mild; currently on Eliquis for VTE prophylaxis s/p right TKA, closely monitor. Fall precautions. Supportive care. Continue daily aspirin and statin.   -GERD;  aspiration precautions, PPI.   -Obstructive sleep apnea; continuous pulse oximetry.   -Obesity by BMI, Body mass index is 30.18 kg/m².  -Affecting all aspects of care.  -Encourage lifestyle modifications.    *Have added CMP and Mag STAT to monitor electrolytes, renal function, and liver enzymes. Will follow with final results*      Thank you for the opportunity to participate in the care of your patient. Please do not hesitate to call with any questions or concerns.     Alejandra GonzalezUC [facility]  09/06/22  13:17 EDT  Pager #[phone]  ---------------------------------------------------------------------------------------------------------------------

## 2022-09-06 NOTE — DISCHARGE PLACEMENT REQUEST
"Vladimir Richards (77 y.o. Female)             Date of Birth   1945    Social Security Number       Address   [address] [place] [address]    Home Phone   [phone]    MRN   [id number]       Religious   Religion    Marital Status                               Admission Date   9/6/22    Admission Type   Elective    Admitting Provider   Howard Estrada MD    Attending Provider   Howard Estrada MD    Department, Room/Bed   [address], 3351/1S       Discharge Date       Discharge Disposition       Discharge Destination                               Attending Provider: Howard Estrada MD    Allergies: Morphine And Related    Isolation: None   Infection: None   Code Status: CPR   Advance Care Planning Activity    Ht: 167.6 cm (66\")   Wt: 84.8 kg (187 lb)    Admission Cmt: None   Principal Problem: Osteoarthritis of knee [M17.10]                 Active Insurance as of 9/6/2022     Primary Coverage     Payor Plan Insurance Group Employer/Plan Group    MEDICARE MEDICARE A & B      Payor Plan Address Payor Plan Phone Number Payor Plan Fax Number Effective Dates    [address] [phone]  4/1/2009 - None Entered    [facility] [id number]       Subscriber Name Subscriber Birth Date Member ID       VLADIMIR RICHARDS 1945 [id number]           Secondary Coverage     Payor Plan Insurance Group Employer/Plan Group    AARP MC SUP AARP HEALTH CARE OPTIONS      Payor Plan Address Payor Plan Phone Number Payor Plan Fax Number Effective Dates    [facility] [phone]  1/1/2016 - None Entered    [address]       [facility] [address]       Subscriber Name Subscriber Birth Date Member ID       VLADIMIR RICHARDS 1945 [id number]                 Emergency Contacts      (Rel.) Home Phone Work Phone Mobile Phone    Everardo Hill (Brother) [phone] -- --    DEANNA HILL (Relative) [phone] -- [phone]               History & Physical      Natalie, " Howard Pitts MD at 09/06/22 0700        H&P reviewed. The patient was examined and there are no changes to the H&P.    Electronically signed by Howard Estrada MD at 09/06/22 0756   Source Note          History and Physical      Patient: Raisa Hernández  YOB: 1945  Date of Encounter: 08/17/2022      Chief Complaint:   Chief Complaint   Patient presents with   • Right Knee - Osteoarthritis, Follow-up     Surgery update for a right total knee arthroplasty           HPI:   Raisa Hernández, 77 y.o. female, presents in follow-up with continued right knee pain and known advanced osteoarthritis.  She has been considering surgical treatment for her right knee however she recently was admitted to the hospital for pneumonia and was then referred to rehabilitation unit.  She is now home doing much better her breathing is much better.  She wishes to consider total knee replacement.  Has moderate to severe pain with simple ambulation.  She has had a stroke in the past she is anticoagulated with Plavix and aspirin.  She has previously been cleared for surgery..  Her medical history includes pulmonary hypertension, thrombotic stroke and pneumonia.      Active Problem List:  Patient Active Problem List   Diagnosis   • Hypertensive disorder   • Thrombotic stroke (HCC)   • Hyperlipidemia   • Osteoarthritis of knee   • Sleep apnea   • Status post total left knee replacement   • Angina pectoris ([facility])   • Premature atrial contraction   • Diplopia   • Cushingoid side effect of steroids ([facility])   • Leukocytosis   • Neuropathy   • Postmenopausal osteoporosis   • Pulmonary HTN ([facility])   • Squamous cell carcinoma of hand   • Vertical nystagmus   • Essential hypertension   • Pneumonia of right lung due to infectious organism, unspecified part of lung   • Hyponatremia   • Hypomagnesemia   • Chronic anemia   • Elevated CK   • Sepsis with acute respiratory failure without septic shock ([facility])   • Gastroesophageal reflux disease without  esophagitis   • Other dysphagia   • Hiatal hernia   • Lower esophageal ring ([facility])           Past Medical History:  Past Medical History:   Diagnosis Date   • Arthritis    • Asthma    • Cancer (HCC)    • Elevated cholesterol    • GERD (gastroesophageal reflux disease)    • Hyperlipidemia    • Hypertension 05/16/2016   • Sleep apnea    • Stroke (HCC) 05/16/2016 2009 2015           Past Surgical History:  Past Surgical History:   Procedure Laterality Date   • BRAVO PROCEDURE N/A 6/14/2022    Procedure: ESOPHAGOGASTRODUODENOSCOPY WITH DILITATION  AND WHEELER;  Surgeon: Robbie Winters MD;  Location: [facility];  Service: Gastroenterology;  Laterality: N/A;  GE JUNCTION 37CM  WHEELER PLACED AT 31CM   • CATARACT EXTRACTION     • CHOLECYSTECTOMY     • COLON SURGERY     • KNEE SURGERY Left            Family History:  Family History   Problem Relation Age of Onset   • Heart disease Mother    • Heart disease Father    • Cancer Brother    • Breast cancer Paternal Aunt    • Breast cancer Paternal Aunt            Social History:  Social History     Socioeconomic History   • Marital status:    Tobacco Use   • Smoking status: Never Smoker   • Smokeless tobacco: Never Used   Vaping Use   • Vaping Use: Never used   Substance and Sexual Activity   • Alcohol use: No   • Drug use: No   • Sexual activity: Never     Body mass index is 30.71 kg/m².        Medications:  Current Outpatient Medications   Medication Sig Dispense Refill   • acetaminophen (TYLENOL) 650 MG 8 hr tablet Take 650 mg by mouth 4 (Four) Times a Day.     • aspirin 81 MG EC tablet Take 1 tablet by mouth.     • clopidogrel (PLAVIX) 75 MG tablet Take 75 mg by mouth Daily.     • cyanocobalamin 1000 MCG/ML injection Inject 1,000 mcg under the skin into the appropriate area as directed Every 30 (Thirty) Days. Middle of the month.     • Diclofenac Sodium (Pennsaid) 2 % solution Apply 1 application topically 2 (Two) Times a Day As Needed (pain).     • Diclofenac  Sodium (VOLTAREN) 1 % gel gel Apply 4 g topically to the appropriate area as directed 2 (Two) Times a Day As Needed (Pain).     • folic acid (FOLVITE) 1 MG tablet Take 1 mg by mouth daily.     • ipratropium-albuterol (DUO-NEB) 0.5-2.5 mg/3 ml nebulizer Inhale 3 mL by nebulization 4 (Four) Times a Day. 360 mL 1   • lisinopril (PRINIVIL,ZESTRIL) 5 MG tablet Take 5 mg by mouth Daily.     • Lutein-Zeaxanthin 25-5 MG capsule Take 1 capsule by mouth 2 (Two) Times a Day.     • meclizine (ANTIVERT) 12.5 MG tablet Take 12.5 mg by mouth 3 (Three) Times a Day As Needed for Dizziness.     • multivitamin (THERAGRAN) tablet tablet Take 1 tablet by mouth 2 (Two) Times a Day.     • omeprazole (priLOSEC) 20 MG capsule Take 1 capsule by mouth 2 (Two) Times a Day As Needed (as needed for reflux). 60 capsule 2   • vitamin D (ERGOCALCIFEROL) 1.25 MG (71342 UT) capsule capsule Take 50,000 Units by mouth Every 7 (Seven) Days. Sundays.     • benzonatate (TESSALON) 100 MG capsule Take 1 capsule by mouth 3 (Three) Times a Day As Needed for Cough. 20 capsule 0   • omeprazole (priLOSEC) 20 MG capsule Take 1 capsule by mouth 2 (Two) Times a Day. 60 capsule 1     No current facility-administered medications for this visit.           Allergies:  Allergies   Allergen Reactions   • Morphine And Related Delirium           Review of Systems:   Review of Systems   Constitutional: Negative.    HENT: Negative.    Eyes: Negative.    Respiratory: Positive for shortness of breath.    Cardiovascular: Negative.    Gastrointestinal: Negative.    Endocrine: Negative.    Genitourinary: Negative.    Musculoskeletal: Positive for arthralgias, joint swelling and neck pain.   Skin: Negative.    Allergic/Immunologic: Negative.    Neurological: Negative.    Hematological: Bruises/bleeds easily.   Psychiatric/Behavioral: Negative.            Physical Exam:   Physical Exam  Vitals and nursing note reviewed.   Constitutional:       General: She is not in acute  "distress.     Appearance: She is not diaphoretic.   HENT:      Head: Normocephalic and atraumatic.      Right Ear: External ear normal.      Left Ear: External ear normal.   Eyes:      General:         Right eye: No discharge.         Left eye: No discharge.      Conjunctiva/sclera: Conjunctivae normal.   Cardiovascular:      Rate and Rhythm: Normal rate and regular rhythm.      Heart sounds: Normal heart sounds. No murmur heard.  Pulmonary:      Effort: Pulmonary effort is normal. No respiratory distress.      Breath sounds: Normal breath sounds. No wheezing.   Abdominal:      General: There is no distension.      Palpations: Abdomen is soft.      Tenderness: There is no guarding.   Musculoskeletal:      Cervical back: Normal range of motion and neck supple.   Skin:     General: Skin is warm and dry.      Capillary Refill: Capillary refill takes less than 2 seconds.   Neurological:      Mental Status: She is alert and oriented to person, place, and time.   Psychiatric:         Behavior: Behavior normal.         Thought Content: Thought content normal.         Judgment: Judgment normal.       GENERAL: 77 y.o. female, alert and oriented X 3 in no acute distress.   Visit Vitals  /69   Pulse 67   Ht 167.6 cm (65.98\")   Wt 86.3 kg (190 lb 3.2 oz)   BMI 30.71 kg/m²         Musculoskeletal:   Examination right knee reveals mild effusion and moderate medial joint line tenderness she demonstrates full extension with flexion to 130 degrees with no instability.  Neurovascular exam grossly intact.        Radiology/Labs:    INDICATION:   Right knee pain after a fall     COMPARISON:   5/11/2022     FINDINGS:  AP, lateral, view(s) of the right knee.  No fracture, dislocation, or effusion. Osteophyte formation is seen at the upper and lower patellar poles. There is moderately severe medial compartment narrowing with medial and lateral joint line osteophytes.  There is varus angulation across the knee. No osteochondral " fragments are seen.  No foreign body.     IMPRESSION:  Moderately severe osteoarthritis especially in the medial compartment. No fracture.     Signer Name: Jose Butt MD   Signed: 5/15/2022 9:55 AM   Workstation Name: [id number]    [facility]    Radiographs         Assessment & Plan:   77 y.o. female presents in follow-up with worsening right knee pain she wishes to proceed with proposed total knee arthroplasty.  We will hold aspirin 1 day preop hold Plavix 7 days preop continue her on aspirin postoperatively and begin Eliquis postoperatively.  Surgery is scheduled [facility] September 6, 2022.      ICD-10-CM ICD-9-CM   1. Primary osteoarthritis of right knee  M17.11 715.16           Cc:   Rodrigo Howe,               This document has been electronically signed by Howard Estrada MD   August 19, 2022 12:12 EDT                  Electronically signed by Howard Estrada MD at 08/22/22 0841             Howard Estrada MD at 08/17/22 0930          History and Physical      Patient: Raisa Hernández  YOB: 1945  Date of Encounter: 08/17/2022      Chief Complaint:   Chief Complaint   Patient presents with   • Right Knee - Osteoarthritis, Follow-up     Surgery update for a right total knee arthroplasty           HPI:   Raisa Hernández, 77 y.o. female, presents in follow-up with continued right knee pain and known advanced osteoarthritis.  She has been considering surgical treatment for her right knee however she recently was admitted to the hospital for pneumonia and was then referred to rehabilitation unit.  She is now home doing much better her breathing is much better.  She wishes to consider total knee replacement.  Has moderate to severe pain with simple ambulation.  She has had a stroke in the past she is anticoagulated with Plavix and aspirin.  She has previously been cleared for surgery..  Her medical history includes pulmonary hypertension, thrombotic  stroke and pneumonia.      Active Problem List:  Patient Active Problem List   Diagnosis   • Hypertensive disorder   • Thrombotic stroke (HCC)   • Hyperlipidemia   • Osteoarthritis of knee   • Sleep apnea   • Status post total left knee replacement   • Angina pectoris (HCC)   • Premature atrial contraction   • Diplopia   • Cushingoid side effect of steroids (HCC)   • Leukocytosis   • Neuropathy   • Postmenopausal osteoporosis   • Pulmonary HTN (HCC)   • Squamous cell carcinoma of hand   • Vertical nystagmus   • Essential hypertension   • Pneumonia of right lung due to infectious organism, unspecified part of lung   • Hyponatremia   • Hypomagnesemia   • Chronic anemia   • Elevated CK   • Sepsis with acute respiratory failure without septic shock (HCC)   • Gastroesophageal reflux disease without esophagitis   • Other dysphagia   • Hiatal hernia   • Lower esophageal ring ([facility])           Past Medical History:  Past Medical History:   Diagnosis Date   • Arthritis    • Asthma    • Cancer (HCC)    • Elevated cholesterol    • GERD (gastroesophageal reflux disease)    • Hyperlipidemia    • Hypertension 05/16/2016   • Sleep apnea    • Stroke (HCC) 05/16/2016 2009 2015           Past Surgical History:  Past Surgical History:   Procedure Laterality Date   • BRAVO PROCEDURE N/A 6/14/2022    Procedure: ESOPHAGOGASTRODUODENOSCOPY WITH DILITATION  AND WHEELER;  Surgeon: Robbie Winters MD;  Location: [facility];  Service: Gastroenterology;  Laterality: N/A;  GE JUNCTION 37CM  WHEELER PLACED AT 31CM   • CATARACT EXTRACTION     • CHOLECYSTECTOMY     • COLON SURGERY     • KNEE SURGERY Left            Family History:  Family History   Problem Relation Age of Onset   • Heart disease Mother    • Heart disease Father    • Cancer Brother    • Breast cancer Paternal Aunt    • Breast cancer Paternal Aunt            Social History:  Social History     Socioeconomic History   • Marital status:    Tobacco Use   • Smoking status:  Never Smoker   • Smokeless tobacco: Never Used   Vaping Use   • Vaping Use: Never used   Substance and Sexual Activity   • Alcohol use: No   • Drug use: No   • Sexual activity: Never     Body mass index is 30.71 kg/m².        Medications:  Current Outpatient Medications   Medication Sig Dispense Refill   • acetaminophen (TYLENOL) 650 MG 8 hr tablet Take 650 mg by mouth 4 (Four) Times a Day.     • aspirin 81 MG EC tablet Take 1 tablet by mouth.     • clopidogrel (PLAVIX) 75 MG tablet Take 75 mg by mouth Daily.     • cyanocobalamin 1000 MCG/ML injection Inject 1,000 mcg under the skin into the appropriate area as directed Every 30 (Thirty) Days. Middle of the month.     • Diclofenac Sodium (Pennsaid) 2 % solution Apply 1 application topically 2 (Two) Times a Day As Needed (pain).     • Diclofenac Sodium (VOLTAREN) 1 % gel gel Apply 4 g topically to the appropriate area as directed 2 (Two) Times a Day As Needed (Pain).     • folic acid (FOLVITE) 1 MG tablet Take 1 mg by mouth daily.     • ipratropium-albuterol (DUO-NEB) 0.5-2.5 mg/3 ml nebulizer Inhale 3 mL by nebulization 4 (Four) Times a Day. 360 mL 1   • lisinopril (PRINIVIL,ZESTRIL) 5 MG tablet Take 5 mg by mouth Daily.     • Lutein-Zeaxanthin 25-5 MG capsule Take 1 capsule by mouth 2 (Two) Times a Day.     • meclizine (ANTIVERT) 12.5 MG tablet Take 12.5 mg by mouth 3 (Three) Times a Day As Needed for Dizziness.     • multivitamin (THERAGRAN) tablet tablet Take 1 tablet by mouth 2 (Two) Times a Day.     • omeprazole (priLOSEC) 20 MG capsule Take 1 capsule by mouth 2 (Two) Times a Day As Needed (as needed for reflux). 60 capsule 2   • vitamin D (ERGOCALCIFEROL) 1.25 MG (19783 UT) capsule capsule Take 50,000 Units by mouth Every 7 (Seven) Days. Sundays.     • benzonatate (TESSALON) 100 MG capsule Take 1 capsule by mouth 3 (Three) Times a Day As Needed for Cough. 20 capsule 0   • omeprazole (priLOSEC) 20 MG capsule Take 1 capsule by mouth 2 (Two) Times a Day. 60  capsule 1     No current facility-administered medications for this visit.           Allergies:  Allergies   Allergen Reactions   • Morphine And Related Delirium           Review of Systems:   Review of Systems   Constitutional: Negative.    HENT: Negative.    Eyes: Negative.    Respiratory: Positive for shortness of breath.    Cardiovascular: Negative.    Gastrointestinal: Negative.    Endocrine: Negative.    Genitourinary: Negative.    Musculoskeletal: Positive for arthralgias, joint swelling and neck pain.   Skin: Negative.    Allergic/Immunologic: Negative.    Neurological: Negative.    Hematological: Bruises/bleeds easily.   Psychiatric/Behavioral: Negative.            Physical Exam:   Physical Exam  Vitals and nursing note reviewed.   Constitutional:       General: She is not in acute distress.     Appearance: She is not diaphoretic.   HENT:      Head: Normocephalic and atraumatic.      Right Ear: External ear normal.      Left Ear: External ear normal.   Eyes:      General:         Right eye: No discharge.         Left eye: No discharge.      Conjunctiva/sclera: Conjunctivae normal.   Cardiovascular:      Rate and Rhythm: Normal rate and regular rhythm.      Heart sounds: Normal heart sounds. No murmur heard.  Pulmonary:      Effort: Pulmonary effort is normal. No respiratory distress.      Breath sounds: Normal breath sounds. No wheezing.   Abdominal:      General: There is no distension.      Palpations: Abdomen is soft.      Tenderness: There is no guarding.   Musculoskeletal:      Cervical back: Normal range of motion and neck supple.   Skin:     General: Skin is warm and dry.      Capillary Refill: Capillary refill takes less than 2 seconds.   Neurological:      Mental Status: She is alert and oriented to person, place, and time.   Psychiatric:         Behavior: Behavior normal.         Thought Content: Thought content normal.         Judgment: Judgment normal.       GENERAL: 77 y.o. female, alert and  "oriented X 3 in no acute distress.   Visit Vitals  /69   Pulse 67   Ht 167.6 cm (65.98\")   Wt 86.3 kg (190 lb 3.2 oz)   BMI 30.71 kg/m²         Musculoskeletal:   Examination right knee reveals mild effusion and moderate medial joint line tenderness she demonstrates full extension with flexion to 130 degrees with no instability.  Neurovascular exam grossly intact.        Radiology/Labs:    INDICATION:   Right knee pain after a fall     COMPARISON:   5/11/2022     FINDINGS:  AP, lateral, view(s) of the right knee.  No fracture, dislocation, or effusion. Osteophyte formation is seen at the upper and lower patellar poles. There is moderately severe medial compartment narrowing with medial and lateral joint line osteophytes.  There is varus angulation across the knee. No osteochondral fragments are seen.  No foreign body.     IMPRESSION:  Moderately severe osteoarthritis especially in the medial compartment. No fracture.     Signer Name: Jose Butt MD   Signed: 5/15/2022 9:55 AM   Workstation Name: [id number]    [facility]         Assessment & Plan:   77 y.o. female presents in follow-up with worsening right knee pain she wishes to proceed with proposed total knee arthroplasty.  We will hold aspirin 1 day preop hold Plavix 7 days preop continue her on aspirin postoperatively and begin Eliquis postoperatively.  Surgery is scheduled [facility] September 6, 2022.      ICD-10-CM ICD-9-CM   1. Primary osteoarthritis of right knee  M17.11 715.16           Cc:   Rodrigo Howe DO              This document has been electronically signed by Howard Estrada MD   August 19, 2022 12:12 EDT                  Electronically signed by Howard Estrada MD at 08/22/22 0841       Lines, Drains & Airways     Active LDAs     Name Placement date Placement time Site Days    Peripheral IV 09/06/22 0650 Left;Posterior Hand 09/06/22  0650  Hand  less than 1          "         Current Facility-Administered Medications   Medication Dose Route Frequency Provider Last Rate Last Admin   • [START ON 9/7/2022] apixaban (ELIQUIS) tablet 2.5 mg  2.5 mg Oral Q12H Howard Estrada MD       • ceFAZolin 2 gm IVPB in 100 mL NS (VTB)  2 g Intravenous Q8H Howard Estrada MD       • docusate sodium (COLACE) capsule 100 mg  100 mg Oral BID PRN Howard Estrada MD       • folic acid (FOLVITE) tablet 1 mg  1 mg Oral Daily Alejandra Gonzalez, APRN       • HYDROmorphone (DILAUDID) injection 0.5 mg  0.5 mg Intravenous Q2H PRN Howard Estrada MD        And   • naloxone (NARCAN) injection 0.1 mg  0.1 mg Intravenous Q5 Min PRN Howard Estrada MD       • lactated ringers infusion  100 mL/hr Intravenous Continuous Howard Estrada MD       • midazolam (VERSED) injection 0.5 mg  0.5 mg Intravenous Q10 Min PRN Ravin Jamil MD       • multivitamin (THERAGRAN) tablet 1 tablet  1 tablet Oral Daily Alejanrda Gonzalez, APRN       • ondansetron (ZOFRAN) tablet 4 mg  4 mg Oral Q6H PRN Howard Estrada MD        Or   • ondansetron (ZOFRAN) injection 4 mg  4 mg Intravenous Q6H PRN Howard Estrada MD       • oxyCODONE-acetaminophen (PERCOCET) 5-325 MG per tablet 2 tablet  2 tablet Oral Q4H PRN Howard Estrada MD       • [START ON 9/7/2022] pantoprazole (PROTONIX) EC tablet 40 mg  40 mg Oral Q AM Alejandra Gonzalez, APRN       • sodium chloride 0.9 % flush 10 mL  10 mL Intravenous Q12H Ravin Jamil MD       • sodium chloride 0.9 % flush 10 mL  10 mL Intravenous PRN Ravin Jamil MD            Operative/Procedure Notes (most recent note)      Howard Estrada MD at 09/06/22 0756        TOTAL KNEE ARTHROPLASTY  Procedure Note    Raisa Hernández  9/6/2022    Pre-op Diagnosis:   Primary osteoarthritis of right knee [M17.11]    Post-op Diagnosis:     Post-Op Diagnosis Codes:     * Primary osteoarthritis of right knee [M17.11]    Procedure(s):  RIGHT  TOTAL KNEE ARTHROPLASTY    Surgeon(s):  Howard Estrada MD    Anesthesia: General/femoral nerve block/periarticular block  Operative technique: With patient in the operating theatre under general anesthesia with femoral nerve block administered right leg in the preoperative area 2 g of Ancef administered immediately preoperatively with 1 g of TXA.  With right lower extremity sterilely prepped and draped in usual manner midline incision was created medial parapatellar arthrotomy carried out patella everted the knee flexed both menisci excised with anterior cruciate ligament.  Distal femur was cannulated with intramedullary guide position distal femoral cut was made at 9 mm.  With second jig applied anterior posterior and chamfer cuts completed third jig applied intercondylar notch was osteotomized and lugs were drilled.  With external tibial alignment device position proximal tibial osteotomy carried out removing 4 mm medial side 10 mm lateral side.  Flexion extension gaps were checked and were appropriate.  Proximal tibia was prepared for implant creating keel and broach and with trial tibial tray in place femoral component was repositioned.  Variety of inserts were then placed determining appropriate thickness.  With the knee held in full extension extremity was exsanguinated tourniquet inflated to 250 mmHg.  Posterior aspect the patella was osteotomized and drilled for 3 peg patellar button.  Joint was copiously irrigated periarticular block provided with 20 cc of Exparel.  With cement prepared and all 3 components precoated the femoral and tibial components were impacted into place excess cement removed trial tibial insert position as the knee was held in full extension patella was clamped in place excess cement removed.  Once cement had hardened again the knee was taken through full motion confirming flexion extension with good medial lateral stability.  With the joint copiously irrigated rotating  platform tibial insert was position again confirmed to be appropriate with good medial lateral stability full extension and flexion.  Quadriceps mechanism was reapproximated with #1 strata fix running.  Skin was reapproximated in layers with 3 oh strata fix running for final closure with Dermabond skin system position and sterile dressing with knee immobilizer she was taken to recovery room in stable condition.    Staff:   Circulator: Christin Ellsworth RN  Scrub Person: Devika Dawson  Assistant: Pola Ly    Estimated Blood Loss: none    Specimens:   none               Implants/Grafts: Attune rotating platform cemented posterior stabilized femur size 4 rotating platform tibial base size 4 cutting platform posterior stabilized tibial insert size 4 x 5 mm medialized patella 35 mm      Drains: None    Complications: none    Tourniquet time: 54  min    Howard Estrada MD     Date: 2022  Time: 10:08 EDT    Cc: Rodrigo Howe DO       Electronically signed by Howard Estrada MD at 22 1014       Physician Progress Notes (most recent note)    No notes of this type exist for this encounter.            Consult Notes (most recent note)      Alejandra oGnzalez APRN at 22 1317      Consult Orders    1. Inpatient Consult to Hospitalist [[id number]] ordered by Howard Estrada MD                   [facility]  CONSULT NOTE    Inpatient Hospitalist Consult  Consult performed by: Alejandra Gonzalez APRN  Consult ordered by: Howard Estrada MD          Patient Identification:  Name:  Raisa Hernández  Age:  77 y.o.  Sex:  female  :  1945  MRN:  [id number]  Visit Number:  [id number]  Primary care provider:  Rodrigo Howe DO    Subjective       History of presenting illness:    Raisa Hernández is a 77 y.o. female admitted by Orthopedic Surgery on 2022 s/p right TKA with known medical history of arthritis, previous colon cancer, GERD,  hyperlipidemia, essential hypertension, obstructive sleep apnea, obesity by BMI, and previous CVAs (2009, 2015).  Raisa Hernández is being evaluated by the Hospital Medicine Service at the request of Dr. Estrada, Howard RAWLS MD for medical management.     Procedures/Imaging:   · Right TKA by Dr. Estrada, 9/6/22  · Xray, right knee    On exam, patient is awake, alert, oriented, and pleasant. Denies any pain in the RLE at this time. Denies chest pain, palpitations, abdominal pain, nausea, vomiting, dysuria, flank pain, and chills. Does report some irritation in left eye postoperatively. Sclera appears reddened without drainage, hemorrhage, or obvious laceration. Patient denies loss of vision in left eye. Reports that she uses 2 L nasal cannula as needed at home; currently stable on 2 L. States that she has had previous left TKA with Dr. Estrada approximately 5 years ago. Denies numbness or tingling in RLE. Knee brace intact. Cap refill < 3 seconds. SCD in place to left leg. Denies any further concerns or questions at the moment.     [facility]  Discussed with Mariah Lemos DO.  ---------------------------------------------------------------------------------------------------------------------  Review of Systems   Constitutional: Negative for appetite change, chills, fatigue and fever.   HENT: Negative for congestion and trouble swallowing.    Respiratory: Negative for choking, shortness of breath and wheezing.    Cardiovascular: Negative for chest pain, palpitations and leg swelling.   Gastrointestinal: Negative for abdominal distention and abdominal pain.   Genitourinary: Negative for difficulty urinating, dysuria and flank pain.   Musculoskeletal: Positive for arthralgias and gait problem. Negative for neck pain and neck stiffness.   Skin: Negative for rash and wound.   Neurological: Negative for dizziness, tremors, seizures, syncope, facial asymmetry, speech difficulty,  light-headedness, numbness and headaches.      Otherwise 10-system ROS reviewed and is negative except as mentioned in the HPI.  ---------------------------------------------------------------------------------------------------------------------   Past History:  Family History   Problem Relation Age of Onset   • Heart disease Mother    • Heart disease Father    • Cancer Brother    • Breast cancer Paternal Aunt    • Breast cancer Paternal Aunt      Past Medical History:   Diagnosis Date   • Arthritis    • Asthma    • Cancer (HCC)     colon   • Elevated cholesterol    • GERD (gastroesophageal reflux disease)    • Hyperlipidemia    • Hypertension 05/16/2016   • Sleep apnea    • Stroke (HCC) 05/16/2016 2009 2015     Past Surgical History:   Procedure Laterality Date   • BRAVO PROCEDURE N/A 06/14/2022    Procedure: ESOPHAGOGASTRODUODENOSCOPY WITH DILITATION  AND WHEELER;  Surgeon: Robbie Winters MD;  Location: [facility];  Service: Gastroenterology;  Laterality: N/A;  GE JUNCTION 37CM  WHEELER PLACED AT 31CM   • CATARACT EXTRACTION Bilateral    • CHOLECYSTECTOMY     • COLON SURGERY     • KNEE SURGERY Left     arthroplasty     Social History     Socioeconomic History   • Marital status:    Tobacco Use   • Smoking status: Never Smoker   • Smokeless tobacco: Never Used   Vaping Use   • Vaping Use: Never used   Substance and Sexual Activity   • Alcohol use: No   • Drug use: No   • Sexual activity: Never     ---------------------------------------------------------------------------------------------------------------------   Allergies:  Morphine and related  ---------------------------------------------------------------------------------------------------------------------   Prior to Admission Medications     Prescriptions Last Dose Informant Patient Reported? Taking?    acetaminophen (TYLENOL) 650 MG 8 hr tablet 9/5/2022 Self Yes Yes    Take 1,300 mg by mouth 2 (Two) Times a Day As Needed for Mild Pain or  Moderate Pain.    aspirin 81 MG EC tablet Past Week Self Yes Yes    Take 81 mg by mouth Daily.    atorvastatin (LIPITOR) 20 MG tablet 9/5/2022 Self Yes Yes    Take 20 mg by mouth Every Night.    clopidogrel (PLAVIX) 75 MG tablet Past Week Self Yes Yes    Take 75 mg by mouth Daily.    folic acid (FOLVITE) 1 MG tablet 9/5/2022 Self Yes Yes    Take 1 mg by mouth daily.    ipratropium-albuterol (DUO-NEB) 0.5-2.5 mg/3 ml nebulizer Past Week Self No Yes    Inhale 3 mL by nebulization 4 (Four) Times a Day.    lisinopril (PRINIVIL,ZESTRIL) 5 MG tablet 9/5/2022 Self Yes Yes    Take 5 mg by mouth Daily.    meclizine (ANTIVERT) 12.5 MG tablet 9/5/2022 Self Yes Yes    Take 12.5 mg by mouth 3 (Three) Times a Day As Needed for Dizziness.    multivitamin (THERAGRAN) tablet tablet 9/5/2022 Self Yes Yes    Take 1 tablet by mouth Daily.    omeprazole (priLOSEC) 20 MG capsule 9/5/2022 Self No Yes    Take 1 capsule by mouth 2 (Two) Times a Day.    cyanocobalamin 1000 MCG/ML injection 8/15/2022 Self Yes No    Inject 1,000 mcg under the skin into the appropriate area as directed Every 30 (Thirty) Days.    vitamin D (ERGOCALCIFEROL) 1.25 MG (20156 UT) capsule capsule 8/28/2022 Self Yes No    Take 50,000 Units by mouth Every 7 (Seven) Days. Sundays.        Hospital Meds:  [START ON 9/7/2022] apixaban, 2.5 mg, Oral, Q12H  ceFAZolin, 2 g, Intravenous, Q8H  lactated ringers, 125 mL/hr, Intravenous, Once  sodium chloride, 10 mL, Intravenous, Q12H      lactated ringers, 100 mL/hr      ---------------------------------------------------------------------------------------------------------------------     Objective     Vital Signs:  Temp:  [97.3 °F (36.3 °C)-98.5 °F (36.9 °C)] 97.3 °F (36.3 °C)  Heart Rate:  [53-79] 68  Resp:  [12-20] 14  BP: (132-169)/(69-80) 168/79      09/06/22  0644   Weight: 84.8 kg (187 lb)     Body mass index is 30.18  kg/m².  ---------------------------------------------------------------------------------------------------------------------     Physical Exam  Vitals reviewed.   Constitutional:       General: She is awake.      Appearance: She is obese.      Interventions: Nasal cannula in place.      Comments: Currently on 2 L nasal cannula.    HENT:      Head: Normocephalic and atraumatic.   Eyes:      Extraocular Movements: Extraocular movements intact.      Conjunctiva/sclera:      Left eye: Left conjunctiva is injected.      Comments: Left eye with scleral redness; patient reports irritation and pruritis. No drainage, hemorrhage, or other physical abnormality seen on exam.   Cardiovascular:      Rate and Rhythm: Normal rate and regular rhythm.      Pulses: Normal pulses.           Radial pulses are 2+ on the right side and 2+ on the left side.        Dorsalis pedis pulses are 2+ on the right side and 2+ on the left side.      Heart sounds: Normal heart sounds. No murmur heard.    No friction rub.   Pulmonary:      Effort: Pulmonary effort is normal. No accessory muscle usage, respiratory distress or retractions.      Breath sounds: Normal breath sounds. No wheezing or rhonchi.   Abdominal:      General: Bowel sounds are normal. There is no distension.      Palpations: Abdomen is soft.      Tenderness: There is no abdominal tenderness. There is no guarding.   Musculoskeletal:      Cervical back: Normal range of motion and neck supple. No rigidity.      Right lower leg: No edema.      Left lower leg: No edema.   Skin:     General: Skin is warm and dry.      Capillary Refill: Capillary refill takes less than 2 seconds.   Neurological:      Mental Status: She is alert and oriented to person, place, and time.   Psychiatric:         Mood and Affect: Mood normal.         Behavior: Behavior is cooperative.         ---------------------------------------------------------------------------------------------------------------------    EKG: Will order EKG for baseline.   ---------------------------------------------------------------------------------------------------------------------   Results from last 7 days   Lab Units 09/04/22  0942   WBC 10*3/mm3 4.94   HEMOGLOBIN g/dL 12.9   HEMATOCRIT % 39.0   MCV fL 97.5*   MCHC g/dL 33.1   PLATELETS 10*3/mm3 221         Results from last 7 days   Lab Units 09/04/22  0942   SODIUM mmol/L 141   POTASSIUM mmol/L 4.3   CHLORIDE mmol/L 102   CO2 mmol/L 25.8   BUN mg/dL 11   CREATININE mg/dL 0.99   CALCIUM mg/dL 10.2   GLUCOSE mg/dL 94   Estimated Creatinine Clearance: 51.4 mL/min (by C-G formula based on SCr of 0.99 mg/dL).  No results found for: AMMONIA          Lab Results   Component Value Date    HGBA1C 5.4 06/01/2016     Lab Results   Component Value Date    TSH 0.114 (L) 05/15/2022    FREET4 0.93 05/15/2022     No results found for: PREGTESTUR, PREGSERUM, HCG, HCGQUANT  Pain Management Panel     Pain Management Panel Latest Ref Rng & Units 7/7/2022    CREATININE UR mg/dL 84.8            ---------------------------------------------------------------------------------------------------------------------   Imaging Results (Last 7 Days)     Procedure Component Value Units Date/Time    XR Knee 1 or 2 View Right [[id number]] Collected: 09/06/22 1136     Updated: 09/06/22 1138    Narrative:      EXAM:    XR Right Knee, 1 or 2 Views     EXAM DATE:    9/6/2022 11:11 AM     CLINICAL HISTORY:    Post-Op Knee Arthoplasty; M17.11-Unilateral primary osteoarthritis,  right knee     TECHNIQUE:    Frontal and/or lateral views of the right knee.     COMPARISON:    No relevant prior studies available.     FINDINGS:    Bones/joints:  Total right knee arthroplasty is noted.  No acute  fracture.  No dislocation.    Soft tissues:  Postsurgical changes within the soft tissues.    Other findings:  Anatomic alignment.       Impression:        Status post total right knee arthroplasty. Anatomic alignment noted.     This  report was finalized on 9/6/2022 11:36 AM by Dr. Wilbert Hidalgo MD.             I have personally reviewed the radiology images and read the final radiology report.        Assessment / Plan     Assessment and Plan:  -Osteoarthritis s/p right TKA (9/6/2022)  -Previous left TKA   -Management and plan per primary.  -Eliquis initiated postoperatively for VTE prophylaxis.   -Continuous IV hydration.   -IV and PO analgesics as needed for pain; continuous pulse oximetry.  -PT consult placed.   -Currently WBAT.   -Incentive spirometer use encouraged.    -Essential hypertension  -BP appears moderately controlled with SBP ~150-160's.   -Plan to resume home antihypertensive regimen once reconciled per pharmacy with appropriate holding parameters to prevent hypotension and/or bradycardia.   -Closely monitor BP per hospital protocol, titrate medications as necessary.    -Hyperlipidemia  -Obtain fasting lipid panel in the a.m.  -Resume home statin.     -Chronic normocytic anemia  -Currently stable.  -Hgb 12.9/Hct 39.0.   -Expect some decrease in Hgb postoperatively with continuous IV fluids as well.   -Closely monitor with repeat CBC in the a.m.  -Plan to transfuse if Hgb < 7.   -Continue daily multivitamin and folic acid supplementation.     -Previous strokes (2009, 2016) with chronic right-sided weakness, mild; currently on Eliquis for VTE prophylaxis s/p right TKA, closely monitor. Fall precautions. Supportive care. Continue daily aspirin and statin.   -GERD; aspiration precautions, PPI.   -Obstructive sleep apnea; continuous pulse oximetry.   -Obesity by BMI, Body mass index is 30.18 kg/m².  -Affecting all aspects of care.  -Encourage lifestyle modifications.    *Have added CMP and Mag STAT to monitor electrolytes, renal function, and liver enzymes. Will follow with final results*      Thank you for the opportunity to participate in the care of your patient. Please do not hesitate to call with any questions or concerns.      LACI Alva  [facility] Medicine Team  09/06/22  13:17 EDT  Pager #[phone]  ---------------------------------------------------------------------------------------------------------------------       Electronically signed by Alejandra Gonzalez APRN at 09/06/22 6939         ADL Documentation (most recent)    Flowsheet Row Most Recent Value   Transferring 3 - assistive equipment and person   Toileting 2 - assistive person   Bathing 2 - assistive person   Dressing 2 - assistive person   Eating 0 - independent   Communication 0 - understands/communicates without difficulty   Swallowing 0 - swallows foods/liquids without difficulty   Equipment Currently Used at Home walker, rolling, cane, straight

## 2022-09-06 NOTE — OP NOTE
Physical Therapy Daily Progress Note    Patient: Miranda Paredes   : 1992  Diagnosis/ICD-10 Code:  Pain, neck [M54.2]  Referring practitioner: Pam VIRGEN*  Date of Initial Visit: Type: THERAPY  Noted: 2019  Today's Date: 2019  Patient seen for 17 sessions         Miranda Paredes reports:  Cervical spine did not get worse last week after not getting treatment. Reports continued pain near inferior angle of left scapula and in cervical spine however.      Objective   See Exercise, Manual, and Modality Logs for complete treatment.       Assessment/Plan     Tolerates manual therapy well.  Patient demonstrates proper technique with serratus punches after tactile cueing.  Added to HEP.     Progress per Plan of Care           Manual Therapy:    15     mins  76057;  Therapeutic Exercise:    15     mins  58164;     Neuromuscular Teddy:        mins  36747;    Therapeutic Activity:          mins  43575;     Gait Training:           mins  71646;     Ultrasound:          mins  77826;    Electrical Stimulation:         mins  01181 ( );  Dry Needling          mins self-pay    Timed Treatment:   30   mins   Total Treatment:     30   mins    Toni Powell PT  Physical Therapist                     TOTAL KNEE ARTHROPLASTY  Procedure Note    Raisa Hernández  9/6/2022    Pre-op Diagnosis:   Primary osteoarthritis of right knee [M17.11]    Post-op Diagnosis:     Post-Op Diagnosis Codes:     * Primary osteoarthritis of right knee [M17.11]    Procedure(s):  RIGHT TOTAL KNEE ARTHROPLASTY    Surgeon(s):  Howard Estrada MD    Anesthesia: General/femoral nerve block/periarticular block  Operative technique: With patient in the operating theatre under general anesthesia with femoral nerve block administered right leg in the preoperative area 2 g of Ancef administered immediately preoperatively with 1 g of TXA.  With right lower extremity sterilely prepped and draped in usual manner midline incision was created medial parapatellar arthrotomy carried out patella everted the knee flexed both menisci excised with anterior cruciate ligament.  Distal femur was cannulated with intramedullary guide position distal femoral cut was made at 9 mm.  With second jig applied anterior posterior and chamfer cuts completed third jig applied intercondylar notch was osteotomized and lugs were drilled.  With external tibial alignment device position proximal tibial osteotomy carried out removing 4 mm medial side 10 mm lateral side.  Flexion extension gaps were checked and were appropriate.  Proximal tibia was prepared for implant creating keel and broach and with trial tibial tray in place femoral component was repositioned.  Variety of inserts were then placed determining appropriate thickness.  With the knee held in full extension extremity was exsanguinated tourniquet inflated to 250 mmHg.  Posterior aspect the patella was osteotomized and drilled for 3 peg patellar button.  Joint was copiously irrigated periarticular block provided with 20 cc of Exparel.  With cement prepared and all 3 components precoated the femoral and tibial components were impacted into place excess cement removed trial tibial insert position as the knee was  held in full extension patella was clamped in place excess cement removed.  Once cement had hardened again the knee was taken through full motion confirming flexion extension with good medial lateral stability.  With the joint copiously irrigated rotating platform tibial insert was position again confirmed to be appropriate with good medial lateral stability full extension and flexion.  Quadriceps mechanism was reapproximated with #1 strata fix running.  Skin was reapproximated in layers with 3 oh strata fix running for final closure with Dermabond skin system position and sterile dressing with knee immobilizer she was taken to recovery room in stable condition.    Staff:   Circulator: Christin Ellsworth RN  Scrub Person: Devika Dawson  Assistant: Pola Ly    Estimated Blood Loss: none    Specimens:   none               Implants/Grafts: Attune rotating platform cemented posterior stabilized femur size 4 rotating platform tibial base size 4 cutting platform posterior stabilized tibial insert size 4 x 5 mm medialized patella 35 mm      Drains: None    Complications: none    Tourniquet time: 54  min    Howard Estrada MD     Date: 9/6/2022  Time: 10:08 EDT    Cc: Rodrigo Howe DO

## 2022-09-06 NOTE — ANESTHESIA PROCEDURE NOTES
Airway  Urgency: elective    Date/Time: 9/6/2022 7:36 AM  Airway not difficult    General Information and Staff    Patient location during procedure: OR  Anesthesiologist: Ravin Jamil MD  CRNA/CAA: Joao Mandel CRNA    Indications and Patient Condition  Indications for airway management: airway protection    Preoxygenated: yes  MILS maintained throughout  Mask difficulty assessment: 0 - not attempted    Final Airway Details  Final airway type: endotracheal airway      Successful airway: ETT  Cuffed: yes   Successful intubation technique: direct laryngoscopy  Facilitating devices/methods: intubating stylet  Endotracheal tube insertion site: oral  Blade: Lboo  Blade size: 3  ETT size (mm): 7.0  Cormack-Lehane Classification: grade I - full view of glottis  Placement verified by: chest auscultation, capnometry and palpation of cuff   Cuff volume (mL): 10  Measured from: lips  ETT/EBT  to lips (cm): 21  Number of attempts at approach: 1  Assessment: lips, teeth, and gum same as pre-op and atraumatic intubation    Additional Comments  Dentition and lips same as preop

## 2022-09-06 NOTE — CASE MANAGEMENT/SOCIAL WORK
Discharge Planning Assessment   Siddhartha     Patient Name: Raisa Hernández  MRN: [id number]  Today's Date: 9/6/2022    Admit Date: 9/6/2022     Discharge Needs Assessment     Row Name 09/06/22 1837       Living Environment    People in Home alone    Primary Care Provided by self    Provides Primary Care For no one    Quality of Family Relationships helpful;involved;supportive    Able to Return to Prior Arrangements no       Transition Planning    Patient/Family Anticipates Transition to other (see comments)    Transportation Anticipated family or friend will provide  Brother to provide transportation at discharge.       Discharge Needs Assessment    Equipment Currently Used at Home cane, straight;walker, rolling  from family    Discharge Facility/Level of Care Needs nursing facility, skilled  Pt did not utilize home health services prior to admission. Pt is requesting short term rehab at [facility]. Pt has been to [facility] in the past.               Discharge Plan     Row Name 09/06/22 1841       Plan    Plan Pt lives alone. POA is brother and she voices having a living will (not on file). Pt did not utilize home health services prior to admission. Pt is requesting short term rehab at [facility]. Pt has been to [facility] in the past. SS contacted [facility] per Megha and sent referral. Pt has a straight cane and rolling walker. PCP is Rodrigo Howe. SS to follow and assist as needed with discharge planning.    Patient/Family in Agreement with Plan yes              Continued Care and Services - Admitted Since 9/6/2022     Destination     Service Provider Request Status Selected Services Address Phone Fax Patient Preferred    [facility]  Pending - Request Sent N/A [address] [place] [address] [phone] --                 Demographic Summary     Row Name 09/06/22 1836       General Information    Referral Source physician    Reason for Consult --  SS received consult for Wishes to go to  [facility]. SS spoke to pt at bedside.              TANGELA Altamirano

## 2022-09-06 NOTE — ANESTHESIA PROCEDURE NOTES
Peripheral Block    Pre-sedation assessment completed: 9/6/2022 7:23 AM    Patient reassessed immediately prior to procedure    Stop time: 9/6/2022 7:29 AM  Reason for block: at surgeon's request and post-op pain management  Performed by  Anesthesiologist: Ravin Jamil MD  Preanesthetic Checklist  Completed: patient identified, IV checked, site marked, risks and benefits discussed, surgical consent, monitors and equipment checked, pre-op evaluation and timeout performed  Prep:  Sterile barriers:cap, gloves and sterile barriers  Prep: ChloraPrep  Patient monitoring: blood pressure monitoring, continuous pulse oximetry and EKG  Procedure    Sedation: yes  Performed under: MAC  Guidance:nerve stimulator  Images:still images not obtained  Loss of twitch: 0.5 mA  Laterality:right  Block Type:supraclavicular  Injection Technique:single-shot  Needle Type:short-bevel  Needle Gauge:20 G  Resistance on Injection: less than 15 psi  Catheter Size:20 G    Medications Used: ropivacaine (NAROPIN) injection 0.5 %, 20 mg  Med administered at 9/6/2022 7:25 AM      Post Assessment  Patient Tolerance:comfortable throughout block  Complications:no

## 2022-09-06 NOTE — PLAN OF CARE
Goal Outcome Evaluation:  Plan of Care Reviewed With: patient        Progress: no change       Patient arrived to unit from PACU, total right knee replacement. Patient complaining of pain management, no other needs at this time. Family at bedside. VSS, will continue to monitor and follow plan of care.

## 2022-09-06 NOTE — ANESTHESIA POSTPROCEDURE EVALUATION
Patient: Raisa Hernández    Procedure Summary     Date: 09/06/22 Room / Location: [facility] OR  /  [facility] OR    Anesthesia Start: 0730 Anesthesia Stop: 1012    Procedure: RIGHT TOTAL KNEE ARTHROPLASTY (Right Knee) Diagnosis:       Primary osteoarthritis of right knee      (Primary osteoarthritis of right knee [M17.11])    Surgeons: Howard Estrada MD Provider: Ravin Jamil MD    Anesthesia Type: general ASA Status: 3          Anesthesia Type: general    Vitals  Vitals Value Taken Time   /79 09/06/22 1119   Temp 97.3 °F (36.3 °C) 09/06/22 1119   Pulse 68 09/06/22 1119   Resp 14 09/06/22 1119   SpO2 99 % 09/06/22 1119           Post Anesthesia Care and Evaluation    Patient location during evaluation: PHASE II  Patient participation: complete - patient participated  Level of consciousness: awake and alert  Pain score: 1  Pain management: adequate    Airway patency: patent  Anesthetic complications: No anesthetic complications  PONV Status: controlled  Cardiovascular status: acceptable  Respiratory status: acceptable  Hydration status: acceptable

## 2022-09-07 LAB
ANION GAP SERPL CALCULATED.3IONS-SCNC: 9.6 MMOL/L (ref 5–15)
BUN SERPL-MCNC: 8 MG/DL (ref 8–23)
BUN/CREAT SERPL: 7.9 (ref 7–25)
CALCIUM SPEC-SCNC: 8.8 MG/DL (ref 8.6–10.5)
CHLORIDE SERPL-SCNC: 103 MMOL/L (ref 98–107)
CO2 SERPL-SCNC: 25.4 MMOL/L (ref 22–29)
CREAT SERPL-MCNC: 1.01 MG/DL (ref 0.57–1)
DEPRECATED RDW RBC AUTO: 49.4 FL (ref 37–54)
EGFRCR SERPLBLD CKD-EPI 2021: 57.1 ML/MIN/1.73
ERYTHROCYTE [DISTWIDTH] IN BLOOD BY AUTOMATED COUNT: 13.5 % (ref 12.3–15.4)
GLUCOSE SERPL-MCNC: 130 MG/DL (ref 65–99)
HCT VFR BLD AUTO: 31.5 % (ref 34–46.6)
HGB BLD-MCNC: 10.2 G/DL (ref 12–15.9)
MCH RBC QN AUTO: 32.6 PG (ref 26.6–33)
MCHC RBC AUTO-ENTMCNC: 32.4 G/DL (ref 31.5–35.7)
MCV RBC AUTO: 100.6 FL (ref 79–97)
PLATELET # BLD AUTO: 165 10*3/MM3 (ref 140–450)
PMV BLD AUTO: 9.5 FL (ref 6–12)
POTASSIUM SERPL-SCNC: 4.1 MMOL/L (ref 3.5–5.2)
RBC # BLD AUTO: 3.13 10*6/MM3 (ref 3.77–5.28)
SODIUM SERPL-SCNC: 138 MMOL/L (ref 136–145)
WBC NRBC COR # BLD: 11.38 10*3/MM3 (ref 3.4–10.8)

## 2022-09-07 PROCEDURE — 94761 N-INVAS EAR/PLS OXIMETRY MLT: CPT

## 2022-09-07 PROCEDURE — A9270 NON-COVERED ITEM OR SERVICE: HCPCS | Performed by: ORTHOPAEDIC SURGERY

## 2022-09-07 PROCEDURE — 63710000001 ATORVASTATIN 20 MG TABLET

## 2022-09-07 PROCEDURE — 63710000001 APIXABAN 2.5 MG TABLET: Performed by: ORTHOPAEDIC SURGERY

## 2022-09-07 PROCEDURE — 97162 PT EVAL MOD COMPLEX 30 MIN: CPT

## 2022-09-07 PROCEDURE — 63710000001 ACETAMINOPHEN 500 MG TABLET: Performed by: INTERNAL MEDICINE

## 2022-09-07 PROCEDURE — G0378 HOSPITAL OBSERVATION PER HR: HCPCS

## 2022-09-07 PROCEDURE — 80048 BASIC METABOLIC PNL TOTAL CA: CPT

## 2022-09-07 PROCEDURE — A9270 NON-COVERED ITEM OR SERVICE: HCPCS

## 2022-09-07 PROCEDURE — 63710000001 FOLIC ACID 1 MG TABLET

## 2022-09-07 PROCEDURE — 99212 OFFICE O/P EST SF 10 MIN: CPT

## 2022-09-07 PROCEDURE — 85027 COMPLETE CBC AUTOMATED: CPT | Performed by: ORTHOPAEDIC SURGERY

## 2022-09-07 PROCEDURE — 94799 UNLISTED PULMONARY SVC/PX: CPT

## 2022-09-07 PROCEDURE — 63710000001 PANTOPRAZOLE 40 MG TABLET DELAYED-RELEASE

## 2022-09-07 PROCEDURE — 63710000001 ASPIRIN 81 MG TABLET DELAYED-RELEASE

## 2022-09-07 PROCEDURE — A9270 NON-COVERED ITEM OR SERVICE: HCPCS | Performed by: INTERNAL MEDICINE

## 2022-09-07 PROCEDURE — 63710000001: Performed by: INTERNAL MEDICINE

## 2022-09-07 PROCEDURE — 63710000001 MULTIVITAMIN TABLET

## 2022-09-07 PROCEDURE — 63710000001 OXYCODONE-ACETAMINOPHEN 5-325 MG TABLET: Performed by: ORTHOPAEDIC SURGERY

## 2022-09-07 RX ADMIN — ATORVASTATIN CALCIUM 20 MG: 20 TABLET, FILM COATED ORAL at 21:07

## 2022-09-07 RX ADMIN — APIXABAN 2.5 MG: 2.5 TABLET, FILM COATED ORAL at 08:23

## 2022-09-07 RX ADMIN — IPRATROPIUM BROMIDE AND ALBUTEROL SULFATE 3 ML: .5; 3 SOLUTION RESPIRATORY (INHALATION) at 19:05

## 2022-09-07 RX ADMIN — ASPIRIN 81 MG: 81 TABLET, COATED ORAL at 08:23

## 2022-09-07 RX ADMIN — Medication 10 ML: at 21:08

## 2022-09-07 RX ADMIN — OXYCODONE HYDROCHLORIDE AND ACETAMINOPHEN 1 TABLET: 5; 325 TABLET ORAL at 12:09

## 2022-09-07 RX ADMIN — Medication 1 MG: at 08:22

## 2022-09-07 RX ADMIN — ACETAMINOPHEN 1000 MG: 500 TABLET ORAL at 05:48

## 2022-09-07 RX ADMIN — CARBOXYMETHYLCELLULOSE SODIUM 1 DROP: 5 SOLUTION/ DROPS OPHTHALMIC at 16:43

## 2022-09-07 RX ADMIN — PANTOPRAZOLE SODIUM 40 MG: 40 TABLET, DELAYED RELEASE ORAL at 05:49

## 2022-09-07 RX ADMIN — OXYCODONE HYDROCHLORIDE AND ACETAMINOPHEN 2 TABLET: 5; 325 TABLET ORAL at 21:07

## 2022-09-07 RX ADMIN — APIXABAN 2.5 MG: 2.5 TABLET, FILM COATED ORAL at 21:07

## 2022-09-07 RX ADMIN — Medication 10 ML: at 08:24

## 2022-09-07 RX ADMIN — Medication 1 TABLET: at 08:24

## 2022-09-07 RX ADMIN — CARBOXYMETHYLCELLULOSE SODIUM 1 DROP: 5 SOLUTION/ DROPS OPHTHALMIC at 21:07

## 2022-09-07 RX ADMIN — IPRATROPIUM BROMIDE AND ALBUTEROL SULFATE 3 ML: .5; 3 SOLUTION RESPIRATORY (INHALATION) at 12:36

## 2022-09-07 RX ADMIN — OXYCODONE HYDROCHLORIDE AND ACETAMINOPHEN 1 TABLET: 5; 325 TABLET ORAL at 16:40

## 2022-09-07 RX ADMIN — IPRATROPIUM BROMIDE AND ALBUTEROL SULFATE 3 ML: .5; 3 SOLUTION RESPIRATORY (INHALATION) at 07:05

## 2022-09-07 RX ADMIN — CARBOXYMETHYLCELLULOSE SODIUM 1 DROP: 5 SOLUTION/ DROPS OPHTHALMIC at 08:21

## 2022-09-07 NOTE — SIGNIFICANT NOTE
09/07/22 1405   OTHER   Discipline physical therapy assistant   Rehab Time/Intention   Session Not Performed other (see comments)  (Pt getting bath upon entering room. Pt and aid reports she had been up moving after AM PT session. Pt education on doing exercises in bed throughout evening.)

## 2022-09-07 NOTE — PROGRESS NOTES
Patient Identification:  Name:  Raisa Hernández  Age:  77 y.o.  Sex:  female  :  1945  MRN:  [id number]  Visit Number:  [id number]  Primary Care Provider:  Rodrigo Howe DO    Length of stay:  0    Subjective/Interval History/Consultants/Procedures     Consult by: Howard Spears MD     Subjective/Interval History:    Raisa Hernández is a 77 y.o. female admitted by Orthopedic Surgery on 2022 s/p right TKA with known medical history of arthritis, previous colon cancer, GERD, hyperlipidemia, essential hypertension, obstructive sleep apnea, obesity by BMI, and previous CVAs (, ).  Raisa Hernández is being evaluated by the Hospital Medicine Service at the request of Howard Spears MD for medical management.     Procedures/Imaging:   · Right TKA by Dr. Estrada, 22  · Xray, right knee     On exam, patient is awake, alert, oriented, and pleasant. Reports mild pain in RLE at this time; had just transferred with assistance from bed to chair. Denies chest pain, palpitations, abdominal pain, nausea, vomiting, dysuria, flank pain, and chills. Denies further irritation of left eye. Left eye no longer appearing reddened, no scleral injection. No drainage. Currently stable on room air. Denies numbness or tingling in RLE. Knee brace intact. Cap refill < 3 seconds. Denies any further concerns or questions at the moment.     No acute events reported overnight. Room location at the time of evaluation was [facility]. Discussed with Mariah Lemos DO.   ----------------------------------------------------------------------------------------------------------------------  Current [facility] Meds:  apixaban, 2.5 mg, Oral, Q12H  aspirin, 81 mg, Oral, Daily  atorvastatin, 20 mg, Oral, Nightly  carboxymethylcellulose, 1 drop, Left Eye, TID  [START ON 2022] cholecalciferol, 50,000 Units, Oral, Weekly  folic acid, 1 mg, Oral, Daily  ipratropium-albuterol, 3 mL, Nebulization, 4x Daily - RT  lisinopril, 5 mg,  Oral, Daily  multivitamin, 1 tablet, Oral, Daily  pantoprazole, 40 mg, Oral, Q AM  sodium chloride, 10 mL, Intravenous, Q12H      lactated ringers, 100 mL/hr, Last Rate: Stopped (09/07/22 0533)      ----------------------------------------------------------------------------------------------------------------------      Objective     Vital Signs:  Temp:  [97.7 °F (36.5 °C)-98.6 °F (37 °C)] 98.3 °F (36.8 °C)  Heart Rate:  [60-87] 71  Resp:  [18-22] 18  BP: ()/(48-90) 99/60      09/06/22  0644 09/06/22  1530   Weight: 84.8 kg (187 lb) 97.4 kg (214 lb 11.7 oz)     Body mass index is 34.66 kg/m².    Intake/Output Summary (Last 24 hours) at 9/7/2022 1401  Last data filed at 9/7/2022 0900  Gross per 24 hour   Intake 1360 ml   Output 1300 ml   Net 60 ml     I/O this shift:  In: 240 [P.O.:240]  Out: 800 [Urine:800]  Diet Regular  ----------------------------------------------------------------------------------------------------------------------    Physical Exam  Vitals reviewed.   Constitutional:       General: She is awake. She is not in acute distress.     Appearance: She is obese. She is not diaphoretic.      Comments: Room air.   HENT:      Head: Normocephalic and atraumatic.      Mouth/Throat:      Mouth: Mucous membranes are moist.      Pharynx: Oropharynx is clear.   Eyes:      General:         Right eye: No discharge.         Left eye: No discharge.      Extraocular Movements: Extraocular movements intact.      Conjunctiva/sclera: Conjunctivae normal.   Cardiovascular:      Rate and Rhythm: Normal rate and regular rhythm.      Pulses: Normal pulses.           Radial pulses are 2+ on the right side and 2+ on the left side.        Dorsalis pedis pulses are 2+ on the right side and 2+ on the left side.      Heart sounds: Normal heart sounds. No murmur heard.    No friction rub.   Pulmonary:      Effort: Pulmonary effort is normal. No accessory muscle usage, respiratory distress or retractions.      Breath  sounds: Wheezing (mild, expiratory) present. No rhonchi.   Abdominal:      General: Bowel sounds are normal. There is no distension.      Palpations: Abdomen is soft.      Tenderness: There is no abdominal tenderness. There is no guarding.   Musculoskeletal:         General: No swelling.      Cervical back: Normal range of motion. No rigidity.      Comments: S/P R TKA; brace in place.   Skin:     General: Skin is warm and dry.      Capillary Refill: Capillary refill takes 2 to 3 seconds.   Neurological:      Mental Status: She is alert and oriented to person, place, and time.      Sensory: Sensation is intact.      Motor: No tremor.   Psychiatric:         Attention and Perception: Attention normal.         Mood and Affect: Mood normal.         Speech: Speech normal.         Behavior: Behavior is cooperative.         Thought Content: Thought content normal.         Cognition and Memory: Cognition normal.         ----------------------------------------------------------------------------------------------------------------------      Results from last 7 days   Lab Units 09/07/22  0105 09/04/22  0942   WBC 10*3/mm3 11.38* 4.94   HEMOGLOBIN g/dL 10.2* 12.9   HEMATOCRIT % 31.5* 39.0   MCV fL 100.6* 97.5*   MCHC g/dL 32.4 33.1   PLATELETS 10*3/mm3 165 221         Results from last 7 days   Lab Units 09/07/22  0105 09/06/22  1346 09/04/22  0942   SODIUM mmol/L 138 137 141   POTASSIUM mmol/L 4.1 3.8 4.3   MAGNESIUM mg/dL  --  1.9  --    CHLORIDE mmol/L 103 105 102   CO2 mmol/L 25.4 20.9* 25.8   BUN mg/dL 8 9 11   CREATININE mg/dL 1.01* 0.91 0.99   CALCIUM mg/dL 8.8 9.0 10.2   GLUCOSE mg/dL 130* 120* 94   ALBUMIN g/dL  --  3.31*  --    BILIRUBIN mg/dL  --  0.3  --    ALK PHOS U/L  --  59  --    AST (SGOT) U/L  --  21  --    ALT (SGPT) U/L  --  15  --    Estimated Creatinine Clearance: 54 mL/min (A) (by C-G formula based on SCr of 1.01 mg/dL (H)).  No results found for: AMMONIA         ----------------------------------------------------------------------------------------------------------------------  Imaging Results (Last 24 Hours)     ** No results found for the last 24 hours. **        ----------------------------------------------------------------------------------------------------------------------   I have reviewed the above laboratory values for 09/07/22    Assessment/Plan     Active Hospital Problems    Diagnosis  POA   • Primary osteoarthritis of right knee [M17.11]  Yes       Assessment and Plan:  -Osteoarthritis s/p right TKA (9/6/2022)  -Previous left TKA   -Management and plan per primary.  -Eliquis initiated postoperatively for VTE prophylaxis.   -Continuous IV hydration.   -IV and PO analgesics as needed for pain; continuous pulse oximetry.  -PT consult placed.   -Currently WBAT.   -Incentive spirometer use encouraged.     -Essential hypertension  -BP appears overall controlled; borderline low BP this a.m. (99/60), possibly 2/2 pain medication. Lisinopril held at that time.   -Continue home antihypertensive regimen with appropriate holding parameters to prevent hypotension and/or bradycardia.   -Closely monitor BP per hospital protocol, titrate medications as necessary.      -Chronic macrocytic anemia  -History of folate deficiency  -Acute blood loss anemia in postop setting  -Currently stable.  -Hgb 12.9/Hct 39.0 initially; decrease in Hgb to 10.2 post op as expected.   -Closely monitor with repeat CBC in the a.m.  -Plan to transfuse if Hgb < 7.      -Previous strokes (2009, 2016) with chronic right-sided weakness, mild; currently on Eliquis for VTE prophylaxis s/p right TKA, closely monitor. Fall precautions. Supportive care. Continue daily aspirin and statin.   -GERD; aspiration precautions, PPI.   -HLD; continue statin.   -Obstructive sleep apnea; continuous pulse oximetry.   -Obesity by BMI, Body mass index is 30.18 kg/m².  -Affecting all aspects of care.  -Encourage  lifestyle modifications.     -DVT prophylaxis: Eliquis  -GI prophylaxis: PPI  -Activity: WBAT  -Disposition plans/anticipated needs: Plans to return home on discharge once medically stable.  -Diet: Regular  -Home supplemental O2: Uses 2L NC PRN at home, currently stable on room air/baseline requirements.         Thank you for the opportunity to participate in the care of your patient. Please do not hesitate to call with any questions or concerns.   -----------        LACI Alva  09/07/22  14:01 EDT  Pager #[phone]

## 2022-09-07 NOTE — CASE MANAGEMENT/SOCIAL WORK
Discharge Planning Assessment   [place]     Patient Name: Raisa Hernández  MRN: [id number]  Today's Date: 9/7/2022    Admit Date: 9/6/2022     Discharge Plan     Row Name 09/07/22 1037       Plan    Plan SS received message from Megha at [facility] stating pt has been accepted. SS notified Dr. Estrada. SS to follow.              Continued Care and Services - Admitted Since 9/6/2022     Destination Coordination complete.    Service Provider Request Status Selected Services Address Phone Fax Patient Preferred    [facility]   Selected Skilled Nursing [facility] [address], [place] [address] [phone] --              Expected Discharge Date and Time     Expected Discharge Date Expected Discharge Time    Sep 8, 2022         TANGELA Altamirano

## 2022-09-07 NOTE — PHARMACY PATIENT ASSISTANCE
Pharmacy consulted to check price of eliquis (apixaban) for patient. There is a $ 47.00 copay using the profiled trial card.    Thank You  Michele Millard, Pharmacy Intern  09/07/22  13:24 EDT

## 2022-09-07 NOTE — THERAPY EVALUATION
Acute Care - Physical Therapy Initial Evaluation   Siddhartha     Patient Name: Raisa Hernández  : 1945  MRN: [id number]  Today's Date: 2022   Onset of Illness/Injury or Date of Surgery: 22  Visit Dx:     ICD-10-CM ICD-9-CM   1. Primary osteoarthritis of right knee  M17.11 715.16     Patient Active Problem List   Diagnosis   • Hypertensive disorder   • Thrombotic stroke (HCC)   • Hyperlipidemia   • Sleep apnea   • Status post total left knee replacement   • Angina pectoris (HCC)   • Premature atrial contraction   • Diplopia   • Cushingoid side effect of steroids (HCC)   • Leukocytosis   • Neuropathy   • Postmenopausal osteoporosis   • Pulmonary HTN (HCC)   • Squamous cell carcinoma of hand   • Vertical nystagmus   • Essential hypertension   • Pneumonia of right lung due to infectious organism, unspecified part of lung   • Hyponatremia   • Hypomagnesemia   • Chronic anemia   • Elevated CK   • Sepsis with acute respiratory failure without septic shock (HCC)   • Gastroesophageal reflux disease without esophagitis   • Other dysphagia   • Hiatal hernia   • Lower esophageal ring (Schatzki)   • Primary osteoarthritis of right knee     Past Medical History:   Diagnosis Date   • Arthritis    • Asthma    • Cancer (HCC)     colon   • Elevated cholesterol    • GERD (gastroesophageal reflux disease)    • Hyperlipidemia    • Hypertension 2016   • Sleep apnea    • Stroke (HCC) 2016     Past Surgical History:   Procedure Laterality Date   • BRAVO PROCEDURE N/A 2022    Procedure: ESOPHAGOGASTRODUODENOSCOPY WITH DILITATION  AND WHEELER;  Surgeon: Robbie Winters MD;  Location: [facility];  Service: Gastroenterology;  Laterality: N/A;  GE JUNCTION 37CM  WHEELER PLACED AT 31CM   • CATARACT EXTRACTION Bilateral    • CHOLECYSTECTOMY     • COLON SURGERY     • KNEE SURGERY Left     arthroplasty     PT Assessment (last 12 hours)     PT Evaluation and Treatment     Row Name 22 0932           Physical Therapy Time and Intention    Subjective Information complains of;pain  -CT     Document Type evaluation  -CT     Mode of Treatment physical therapy  -CT     Patient Effort good  -CT     Symptoms Noted During/After Treatment fatigue  -CT     Comment Pt tolerated eval well. Pt reports she is independent PLOF. Pt is CGA/Min A for all mobility at time of eval. Pt reports she being d/c to SNF for short term rehab.  -CT     Row Name 09/07/22 0932          General Information    Patient Profile Reviewed yes  -CT     Onset of Illness/Injury or Date of Surgery 09/06/22  -CT     Referring Physician Natalie  -CT     Patient Observations alert;cooperative;agree to therapy  -CT     Prior Level of Function independent:  -CT     Equipment Currently Used at Home cane, straight;walker, rolling  -CT     Existing Precautions/Restrictions fall  -CT     Equipment Issued to Patient gait belt  -CT     Risks Reviewed patient:  -CT     Benefits Reviewed patient:  -CT     Row Name 09/07/22 0932          Living Environment    Current Living Arrangements home  -CT     People in Home alone  -CT     Row Name 09/07/22 0932          Pain    Pretreatment Pain Rating 3/10  -CT     Posttreatment Pain Rating 4/10  -CT     Pain Location - Side/Orientation Right  -CT     Pain Location - knee  -CT     Row Name 09/07/22 0932          Cognition    Affect/Mental Status (Cognition) WFL  -CT     Orientation Status (Cognition) oriented x 4  -CT     Follows Commands (Cognition) WFL  -CT     Row Name 09/07/22 0932          Range of Motion Comprehensive    Comment, General Range of Motion LLE grossly WFL; RLE hip and ankle WFL, knee limited by pain  -CT     Row Name 09/07/22 0932          Strength Comprehensive (MMT)    Comment, General Manual Muscle Testing (MMT) Assessment LLE grossly 4+/5  -CT     Row Name 09/07/22 0932          Mobility    Extremity Weight-bearing Status right lower extremity  -CT     Right Lower Extremity (Weight-bearing Status)  weight-bearing as tolerated (WBAT)  -CT     Row Name 09/07/22 0932          Bed Mobility    Bed Mobility bed mobility (all) activities;rolling left;rolling right;scooting/bridging;supine-sit;sit-supine  -CT     Scooting/Bridging Kane (Bed Mobility) contact guard  -CT     Sit-Supine Kane (Bed Mobility) minimum assist (75% patient effort)  -CT     Bed Mobility, Safety Issues decreased use of legs for bridging/pushing  -CT     Assistive Device (Bed Mobility) bed rails  -CT     Row Name 09/07/22 0932          Transfers    Transfers sit-stand transfer;stand-sit transfer  -CT     Sit-Stand Kane (Transfers) contact guard  -CT     Stand-Sit Kane (Transfers) contact guard  -CT     Row Name 09/07/22 0932          Sit-Stand Transfer    Assistive Device (Sit-Stand Transfers) walker, front-wheeled  -CT     Row Name 09/07/22 0932          Stand-Sit Transfer    Assistive Device (Stand-Sit Transfers) walker, front-wheeled  -CT     Row Name 09/07/22 0932          Gait/Stairs (Locomotion)    Kane Level (Gait) contact guard  -CT     Assistive Device (Gait) walker, front-wheeled  -CT     Distance in Feet (Gait) 50  -CT     Pattern (Gait) step-to  -CT     Deviations/Abnormal Patterns (Gait) gait speed decreased;weight shifting decreased  -CT     Row Name 09/07/22 0932          Safety Issues, Functional Mobility    Impairments Affecting Function (Mobility) strength  -CT     Row Name 09/07/22 0932          Balance    Balance Assessment sitting static balance;standing static balance  -CT     Static Sitting Balance modified independence  -CT     Position, Sitting Balance supported;sitting in chair  -CT     Static Standing Balance contact guard  -CT     Position/Device Used, Standing Balance walker, front-wheeled  -CT     Row Name             Wound 09/06/22 0758 Right anterior knee Incision    Wound - Properties Group Placement Date: 09/06/22  -KH Placement Time: 0758 -KH Side: Right  -KH Orientation:  anterior  -KH Location: knee  -KH Primary Wound Type: Incision  -KH     Retired Wound - Properties Group Placement Date: 09/06/22  -KH Placement Time: 0758 -KH Side: Right  -KH Orientation: anterior  -KH Location: knee  -KH Primary Wound Type: Incision  -KH     Retired Wound - Properties Group Date first assessed: 09/06/22  -KH Time first assessed: 0758 -KH Side: Right  -KH Location: knee  -KH Primary Wound Type: Incision  -KH     Row Name 09/07/22 0932          Coping    Observed Emotional State calm;cooperative  -CT     Verbalized Emotional State acceptance  -CT     Row Name 09/07/22 0932          Plan of Care Review    Plan of Care Reviewed With patient  -CT     Row Name 09/07/22 0932          Positioning and Restraints    Pre-Treatment Position sitting in chair/recliner  -CT     Post Treatment Position bed  -CT     In Bed supine;call light within reach;encouraged to call for assist;exit alarm on;side rails up x3  -CT     Row Name 09/07/22 0932          Therapy Assessment/Plan (PT)    Patient/Family Therapy Goals Statement (PT) Pt goals are to return to PLOF  -CT     Functional Level at Time of Evaluation (PT) CGA/MIn  -CT     PT Diagnosis (PT) decreased functional mobility s/p R TKA  -CT     Rehab Potential (PT) good, to achieve stated therapy goals  -CT     Criteria for Skilled Interventions Met (PT) yes;skilled treatment is necessary  -CT     Therapy Frequency (PT) 6 times/wk  1-2 times/day; 5-6 times/wk  -CT     Predicted Duration of Therapy Intervention (PT) length of stay  -CT     Row Name 09/07/22 0932          Therapy Plan Review/Discharge Plan (PT)    Therapy Plan Review (PT) evaluation/treatment results reviewed;care plan/treatment goals reviewed;risks/benefits reviewed;current/potential barriers reviewed;participants voiced agreement with care plan;participants included;patient  -CT     Row Name 09/07/22 0932          Physical Therapy Goals    Bed Mobility Goal Selection (PT) bed mobility, PT goal 1   -CT     Transfer Goal Selection (PT) transfer, PT goal 1  -CT     Gait Training Goal Selection (PT) gait training, PT goal 1  -CT     Row Name 09/07/22 0932          Bed Mobility Goal 1 (PT)    Activity/Assistive Device (Bed Mobility Goal 1, PT) bed mobility activities, all  -CT     Mason Level/Cues Needed (Bed Mobility Goal 1, PT) supervision required  -CT     Time Frame (Bed Mobility Goal 1, PT) by discharge  -CT     Row Name 09/07/22 0932          Transfer Goal 1 (PT)    Activity/Assistive Device (Transfer Goal 1, PT) sit-to-stand/stand-to-sit;bed-to-chair/chair-to-bed  -CT     Mason Level/Cues Needed (Transfer Goal 1, PT) supervision required  -CT     Time Frame (Transfer Goal 1, PT) by discharge  -CT     Row Name 09/07/22 0932          Gait Training Goal 1 (PT)    Activity/Assistive Device (Gait Training Goal 1, PT) gait (walking locomotion);assistive device use  -CT     Mason Level (Gait Training Goal 1, PT) supervision required  -CT     Distance (Gait Training Goal 1, PT) 90  -CT     Time Frame (Gait Training Goal 1, PT) by discharge  -CT           User Key  (r) = Recorded By, (t) = Taken By, (c) = Cosigned By    Initials Name Provider Type    CT aCridad Arnett, PT Physical Therapist    Christin Rico RN Registered Nurse                  PT Recommendation and Plan  Anticipated Discharge Disposition (PT): skilled nursing facility (for short term rehab)  Planned Therapy Interventions (PT): balance training, bed mobility training, gait training, home exercise program, manual therapy techniques, motor coordination training, neuromuscular re-education, patient/family education, postural re-education, strengthening, transfer training  Therapy Frequency (PT): 6 times/wk (1-2 times/day; 5-6 times/wk)  Plan of Care Reviewed With: patient       Time Calculation:    PT Charges     Row Name 09/07/22 1333             Time Calculation    PT Received On 09/07/22  -CT      PT Goal Re-Cert Due  Date 09/21/22  -CT            User Key  (r) = Recorded By, (t) = Taken By, (c) = Cosigned By    Initials Name Provider Type    CT Caridad Arnett, PT Physical Therapist              Therapy Charges for Today     Code Description Service Date Service Provider Modifiers Qty    23104345124 HC PT EVAL MOD COMPLEXITY 4 9/7/2022 Caridad Arnett, PT GP 1          PT G-Codes  AM-PAC 6 Clicks Score (PT): 17    Caridad Arnett, PT  9/7/2022

## 2022-09-07 NOTE — PROGRESS NOTES
Inpatient Progress Note  Raisa Hernández  Date: 09/07/22  MRN: [id number]      Subjective: Postop day #1 right total knee arthroplasty complains of moderate pain.  She has ambulated in the hallway today.  She is currently sitting up.        Objective:    Vitals:    09/07/22 0300 09/07/22 0600 09/07/22 0705 09/07/22 0715   BP: 144/90 99/60     BP Location: Left arm Left arm     Patient Position: Lying Lying     Pulse: 87 76 68 70   Resp: 20 18 20 20   Temp:  98.3 °F (36.8 °C)     TempSrc:  Oral     SpO2:  97% 98% 98%   Weight:       Height:           Exam: Right knee reveals normal neurovascular status with full extension and dry dressing.          Labs:    Results from last 7 days   Lab Units 09/07/22  0105 09/04/22  0942   WBC 10*3/mm3 11.38* 4.94   HEMOGLOBIN g/dL 10.2* 12.9   HEMATOCRIT % 31.5* 39.0   MCV fL 100.6* 97.5*   MCHC g/dL 32.4 33.1   PLATELETS 10*3/mm3 165 221         Results from last 7 days   Lab Units 09/07/22  0105 09/06/22  1346 09/04/22  0942   SODIUM mmol/L 138 137 141   POTASSIUM mmol/L 4.1 3.8 4.3   MAGNESIUM mg/dL  --  1.9  --    CHLORIDE mmol/L 103 105 102   CO2 mmol/L 25.4 20.9* 25.8   BUN mg/dL 8 9 11   CREATININE mg/dL 1.01* 0.91 0.99   CALCIUM mg/dL 8.8 9.0 10.2   GLUCOSE mg/dL 130* 120* 94   ALBUMIN g/dL  --  3.31*  --    BILIRUBIN mg/dL  --  0.3  --    ALK PHOS U/L  --  59  --    AST (SGOT) U/L  --  21  --    ALT (SGPT) U/L  --  15  --    Estimated Creatinine Clearance: 54 mL/min (A) (by C-G formula based on SCr of 1.01 mg/dL (H)).  No results found for: AMMONIA          No results found for: HGBA1C  Lab Results   Component Value Date    TSH 0.114 (L) 05/15/2022    FREET4 0.93 05/15/2022         Pain Management Panel     Pain Management Panel Latest Ref Rng & Units 7/7/2022    CREATININE UR mg/dL 84.8          No results found for: BLOODCX  No results found for: URINECX  No results found for: WOUNDCX  No results found for: STOOLCX              Radiology:  Imaging Results (Last 72 Hours)      Procedure Component Value Units Date/Time    XR Knee 1 or 2 View Right [[id number]] Collected: 09/06/22 1136     Updated: 09/06/22 1138    Narrative:      EXAM:    XR Right Knee, 1 or 2 Views     EXAM DATE:    9/6/2022 11:11 AM     CLINICAL HISTORY:    Post-Op Knee Arthoplasty; M17.11-Unilateral primary osteoarthritis,  right knee     TECHNIQUE:    Frontal and/or lateral views of the right knee.     COMPARISON:    No relevant prior studies available.     FINDINGS:    Bones/joints:  Total right knee arthroplasty is noted.  No acute  fracture.  No dislocation.    Soft tissues:  Postsurgical changes within the soft tissues.    Other findings:  Anatomic alignment.       Impression:        Status post total right knee arthroplasty. Anatomic alignment noted.     This report was finalized on 9/6/2022 11:36 AM by Dr. Wilbert Hidalgo MD.               Assessment: Postop day #1 right total knee arthroplasty doing well.      ICD-10-CM ICD-9-CM   1. Primary osteoarthritis of right knee  M17.11 715.16         Plan: Continue to encourage ambulation with probable discharge to nursing home tomorrow.      Howard Estrada MD   09/07/22 12:22 EDT

## 2022-09-07 NOTE — PLAN OF CARE
Goal Outcome Evaluation:           Progress: improving  Outcome Evaluation: Patient has done well today.  Nursing staff assisted patient up to chair this morning and then worked with physical therapy.  PRN pain medication given per order.  Polar ice device in use.  Patients O2 weaned down to room air.  VSS.  Continue current plan of care.

## 2022-09-07 NOTE — PLAN OF CARE
Goal Outcome Evaluation:           Progress: improving    Pt had no complaints, no acute changes. Will continue to follow current POC.

## 2022-09-08 VITALS
WEIGHT: 214.73 LBS | HEART RATE: 98 BPM | HEIGHT: 66 IN | TEMPERATURE: 98.4 F | SYSTOLIC BLOOD PRESSURE: 136 MMHG | OXYGEN SATURATION: 96 % | RESPIRATION RATE: 18 BRPM | BODY MASS INDEX: 34.51 KG/M2 | DIASTOLIC BLOOD PRESSURE: 64 MMHG

## 2022-09-08 LAB
ANION GAP SERPL CALCULATED.3IONS-SCNC: 8.9 MMOL/L (ref 5–15)
BUN SERPL-MCNC: 9 MG/DL (ref 8–23)
BUN/CREAT SERPL: 10.7 (ref 7–25)
CALCIUM SPEC-SCNC: 9.1 MG/DL (ref 8.6–10.5)
CHLORIDE SERPL-SCNC: 101 MMOL/L (ref 98–107)
CO2 SERPL-SCNC: 27.1 MMOL/L (ref 22–29)
CREAT SERPL-MCNC: 0.84 MG/DL (ref 0.57–1)
DEPRECATED RDW RBC AUTO: 49.3 FL (ref 37–54)
EGFRCR SERPLBLD CKD-EPI 2021: 71.2 ML/MIN/1.73
ERYTHROCYTE [DISTWIDTH] IN BLOOD BY AUTOMATED COUNT: 13.8 % (ref 12.3–15.4)
GLUCOSE SERPL-MCNC: 127 MG/DL (ref 65–99)
HCT VFR BLD AUTO: 29.5 % (ref 34–46.6)
HGB BLD-MCNC: 9.7 G/DL (ref 12–15.9)
MCH RBC QN AUTO: 32.4 PG (ref 26.6–33)
MCHC RBC AUTO-ENTMCNC: 32.9 G/DL (ref 31.5–35.7)
MCV RBC AUTO: 98.7 FL (ref 79–97)
PLATELET # BLD AUTO: 181 10*3/MM3 (ref 140–450)
PMV BLD AUTO: 10.2 FL (ref 6–12)
POTASSIUM SERPL-SCNC: 3.8 MMOL/L (ref 3.5–5.2)
RBC # BLD AUTO: 2.99 10*6/MM3 (ref 3.77–5.28)
SODIUM SERPL-SCNC: 137 MMOL/L (ref 136–145)
WBC NRBC COR # BLD: 13.85 10*3/MM3 (ref 3.4–10.8)

## 2022-09-08 PROCEDURE — 63710000001 LISINOPRIL 2.5 MG TABLET: Performed by: INTERNAL MEDICINE

## 2022-09-08 PROCEDURE — 94799 UNLISTED PULMONARY SVC/PX: CPT

## 2022-09-08 PROCEDURE — G0378 HOSPITAL OBSERVATION PER HR: HCPCS

## 2022-09-08 PROCEDURE — 63710000001 PANTOPRAZOLE 40 MG TABLET DELAYED-RELEASE

## 2022-09-08 PROCEDURE — 97110 THERAPEUTIC EXERCISES: CPT

## 2022-09-08 PROCEDURE — 63710000001 ASPIRIN 81 MG TABLET DELAYED-RELEASE

## 2022-09-08 PROCEDURE — 63710000001: Performed by: INTERNAL MEDICINE

## 2022-09-08 PROCEDURE — 63710000001 FOLIC ACID 1 MG TABLET

## 2022-09-08 PROCEDURE — 85027 COMPLETE CBC AUTOMATED: CPT

## 2022-09-08 PROCEDURE — A9270 NON-COVERED ITEM OR SERVICE: HCPCS

## 2022-09-08 PROCEDURE — 99212 OFFICE O/P EST SF 10 MIN: CPT

## 2022-09-08 PROCEDURE — A9270 NON-COVERED ITEM OR SERVICE: HCPCS | Performed by: INTERNAL MEDICINE

## 2022-09-08 PROCEDURE — 97116 GAIT TRAINING THERAPY: CPT

## 2022-09-08 PROCEDURE — 63710000001 MULTIVITAMIN TABLET

## 2022-09-08 PROCEDURE — 80048 BASIC METABOLIC PNL TOTAL CA: CPT

## 2022-09-08 PROCEDURE — A9270 NON-COVERED ITEM OR SERVICE: HCPCS | Performed by: ORTHOPAEDIC SURGERY

## 2022-09-08 PROCEDURE — 63710000001 APIXABAN 2.5 MG TABLET: Performed by: ORTHOPAEDIC SURGERY

## 2022-09-08 RX ORDER — PSEUDOEPHEDRINE HCL 30 MG
100 TABLET ORAL 2 TIMES DAILY PRN
Qty: 20 CAPSULE | Refills: 0 | Status: SHIPPED | OUTPATIENT
Start: 2022-09-08

## 2022-09-08 RX ORDER — POLYETHYLENE GLYCOL 3350 17 G/17G
17 POWDER, FOR SOLUTION ORAL DAILY PRN
Status: DISCONTINUED | OUTPATIENT
Start: 2022-09-08 | End: 2022-09-08 | Stop reason: HOSPADM

## 2022-09-08 RX ORDER — DOCUSATE SODIUM 100 MG/1
100 CAPSULE, LIQUID FILLED ORAL 2 TIMES DAILY PRN
Status: DISCONTINUED | OUTPATIENT
Start: 2022-09-08 | End: 2022-09-08 | Stop reason: HOSPADM

## 2022-09-08 RX ORDER — ASPIRIN 81 MG/1
81 TABLET ORAL DAILY
Qty: 30 TABLET | Refills: 0 | Status: SHIPPED | OUTPATIENT
Start: 2022-09-09

## 2022-09-08 RX ORDER — OXYCODONE HYDROCHLORIDE AND ACETAMINOPHEN 5; 325 MG/1; MG/1
1 TABLET ORAL EVERY 4 HOURS PRN
Qty: 30 TABLET | Refills: 0 | Status: SHIPPED | OUTPATIENT
Start: 2022-09-08 | End: 2022-09-13

## 2022-09-08 RX ADMIN — IPRATROPIUM BROMIDE AND ALBUTEROL SULFATE 3 ML: .5; 3 SOLUTION RESPIRATORY (INHALATION) at 00:15

## 2022-09-08 RX ADMIN — Medication 1 TABLET: at 08:48

## 2022-09-08 RX ADMIN — APIXABAN 2.5 MG: 2.5 TABLET, FILM COATED ORAL at 08:48

## 2022-09-08 RX ADMIN — Medication 10 ML: at 08:49

## 2022-09-08 RX ADMIN — LISINOPRIL 5 MG: 2.5 TABLET ORAL at 08:48

## 2022-09-08 RX ADMIN — PANTOPRAZOLE SODIUM 40 MG: 40 TABLET, DELAYED RELEASE ORAL at 06:17

## 2022-09-08 RX ADMIN — Medication 1 MG: at 08:48

## 2022-09-08 RX ADMIN — ASPIRIN 81 MG: 81 TABLET, COATED ORAL at 08:48

## 2022-09-08 RX ADMIN — IPRATROPIUM BROMIDE AND ALBUTEROL SULFATE 3 ML: .5; 3 SOLUTION RESPIRATORY (INHALATION) at 06:40

## 2022-09-08 RX ADMIN — IPRATROPIUM BROMIDE AND ALBUTEROL SULFATE 3 ML: .5; 3 SOLUTION RESPIRATORY (INHALATION) at 13:13

## 2022-09-08 RX ADMIN — CARBOXYMETHYLCELLULOSE SODIUM 1 DROP: 5 SOLUTION/ DROPS OPHTHALMIC at 08:48

## 2022-09-08 NOTE — PROGRESS NOTES
Patient Identification:  Name:  Raisa Hernández  Age:  77 y.o.  Sex:  female  :  1945  MRN:  [id number]  Visit Number:  [id number]  Primary Care Provider:  Rodrigo Howe DO    Length of stay:  0    Subjective/Interval History/Consultants/Procedures     Consult by: Howard Spears MD      Subjective/Interval History:    Raisa Hernández is a 77 y.o. female admitted by Orthopedic Surgery on 2022 s/p right TKA with known medical history of arthritis, previous colon cancer, GERD, hyperlipidemia, essential hypertension, obstructive sleep apnea, obesity by BMI, and previous CVAs (, ).  Raisa Hernández is being evaluated by the Hospital Medicine Service at the request of Howard Spears MD for medical management.      Procedures/Imaging:   · Right TKA by Dr. Estrada, 22  · Xray, right knee     On exam, patient is awake, alert, oriented, and pleasant. Reports that pain and ROM in RLE has much improved this a.m. Denies chest pain, palpitations, abdominal pain, nausea, vomiting, diarrhea, dysuria, flank pain, and chills. Currently stable on room air. Denies numbness or tingling in RLE. Knee brace intact with cooling device. Cap refill < 3 seconds. Denies any further concerns or questions at the moment.      No acute events reported overnight. Room location at the time of evaluation was [facility]. Discussed with Mariah Lemos DO.   ----------------------------------------------------------------------------------------------------------------------  Current Hospital Meds:  apixaban, 2.5 mg, Oral, Q12H  aspirin, 81 mg, Oral, Daily  atorvastatin, 20 mg, Oral, Nightly  carboxymethylcellulose, 1 drop, Left Eye, TID  [START ON 2022] cholecalciferol, 50,000 Units, Oral, Weekly  folic acid, 1 mg, Oral, Daily  ipratropium-albuterol, 3 mL, Nebulization, 4x Daily - RT  lisinopril, 5 mg, Oral, Daily  multivitamin, 1 tablet, Oral, Daily  pantoprazole, 40 mg, Oral, Q AM  sodium chloride, 10 mL,  Intravenous, Q12H      lactated ringers, 100 mL/hr, Last Rate: Stopped (09/07/22 0533)      ----------------------------------------------------------------------------------------------------------------------      Objective     Vital Signs:  Temp:  [97.7 °F (36.5 °C)-98.3 °F (36.8 °C)] 98.3 °F (36.8 °C)  Heart Rate:  [66-98] 86  Resp:  [16-20] 16  BP: (117-136)/(50-60) 129/57      09/06/22  0644 09/06/22  1530   Weight: 84.8 kg (187 lb) 97.4 kg (214 lb 11.7 oz)     Body mass index is 34.66 kg/m².    Intake/Output Summary (Last 24 hours) at 9/8/2022 0914  Last data filed at 9/8/2022 0300  Gross per 24 hour   Intake 1140 ml   Output --   Net 1140 ml     No intake/output data recorded.  Diet Regular  ----------------------------------------------------------------------------------------------------------------------    Physical Exam  Vitals and nursing note reviewed.   Constitutional:       General: She is awake. She is not in acute distress.     Appearance: She is obese. She is not diaphoretic.      Comments: Currently on room air.    HENT:      Head: Normocephalic and atraumatic.      Mouth/Throat:      Mouth: Mucous membranes are moist.      Pharynx: Oropharynx is clear.   Eyes:      Extraocular Movements: Extraocular movements intact.   Cardiovascular:      Rate and Rhythm: Normal rate and regular rhythm.      Pulses: Normal pulses.           Radial pulses are 2+ on the right side and 2+ on the left side.        Dorsalis pedis pulses are 2+ on the right side and 2+ on the left side.      Heart sounds: Normal heart sounds. No murmur heard.    No friction rub.   Pulmonary:      Effort: Pulmonary effort is normal. No accessory muscle usage, respiratory distress or retractions.      Breath sounds: No wheezing or rhonchi.   Abdominal:      General: Bowel sounds are normal. There is no distension.      Palpations: Abdomen is soft.      Tenderness: There is no abdominal tenderness. There is no guarding.    Musculoskeletal:      Cervical back: Normal range of motion and neck supple. No rigidity.   Skin:     General: Skin is warm and dry.   Neurological:      Mental Status: She is alert and oriented to person, place, and time. Mental status is at baseline.      Sensory: Sensation is intact.      Motor: No tremor.      Gait: Gait abnormal.   Psychiatric:         Attention and Perception: Attention normal.         Mood and Affect: Mood normal.         Speech: Speech normal.         Behavior: Behavior is cooperative.         Thought Content: Thought content normal.         Cognition and Memory: Cognition normal.       ----------------------------------------------------------------------------------------------------------------------      Results from last 7 days   Lab Units 09/08/22 0305 09/07/22 0105 09/04/22  0942   WBC 10*3/mm3 13.85* 11.38* 4.94   HEMOGLOBIN g/dL 9.7* 10.2* 12.9   HEMATOCRIT % 29.5* 31.5* 39.0   MCV fL 98.7* 100.6* 97.5*   MCHC g/dL 32.9 32.4 33.1   PLATELETS 10*3/mm3 181 165 221         Results from last 7 days   Lab Units 09/08/22 0305 09/07/22 0105 09/06/22  1346   SODIUM mmol/L 137 138 137   POTASSIUM mmol/L 3.8 4.1 3.8   MAGNESIUM mg/dL  --   --  1.9   CHLORIDE mmol/L 101 103 105   CO2 mmol/L 27.1 25.4 20.9*   BUN mg/dL 9 8 9   CREATININE mg/dL 0.84 1.01* 0.91   CALCIUM mg/dL 9.1 8.8 9.0   GLUCOSE mg/dL 127* 130* 120*   ALBUMIN g/dL  --   --  3.31*   BILIRUBIN mg/dL  --   --  0.3   ALK PHOS U/L  --   --  59   AST (SGOT) U/L  --   --  21   ALT (SGPT) U/L  --   --  15   Estimated Creatinine Clearance: 64.9 mL/min (by C-G formula based on SCr of 0.84 mg/dL).  No results found for: AMMONIA        ----------------------------------------------------------------------------------------------------------------------  Imaging Results (Last 24 Hours)     ** No results found for the last 24 hours. **         ----------------------------------------------------------------------------------------------------------------------   I have reviewed the above laboratory values for 09/08/22    Assessment/Plan     Active Hospital Problems    Diagnosis  POA   • Primary osteoarthritis of right knee [M17.11]  Yes         Assessment and Plan:  -Osteoarthritis s/p right TKA (9/6/2022)  -Previous left TKA   -Management and plan per primary.   -Eliquis initiated postoperatively for VTE prophylaxis.   -Continuous IV hydration; will discontinue LR this morning.  -IV and PO analgesics as needed for pain; continuous pulse oximetry.  -PT following.   -Currently WBAT.   -Incentive spirometer use encouraged.    -Leukocytosis  -WBCs increased to 13.85 this a.m.; likely reactive to procedure.  -Denies fever, chills, dysuria, diarrhea, productive cough.   -Closely monitor temperature curve.  -VS stable.   -Recommend follow-up CBC with PCP after discharge.      -Essential hypertension  -BP appears overall controlled.  -Continue home antihypertensive regimen with appropriate holding parameters to prevent hypotension and/or bradycardia.   -Closely monitor BP per hospital protocol, titrate medications as necessary.      -Chronic macrocytic anemia  -History of folate deficiency  -Acute blood loss anemia in postop setting  -Currently stable.  -Hgb 12.9/Hct 39.0 initially; decrease in Hgb to 9.7 post op.    -Plan to transfuse if Hgb < 7.      -Previous strokes (2009, 2016) with chronic right-sided weakness, mild; currently on Eliquis for VTE prophylaxis s/p right TKA, closely monitor. Fall precautions. Supportive care. Continue daily aspirin and statin.   -GERD; aspiration precautions, PPI.   -HLD; continue statin.   -Obstructive sleep apnea; continuous pulse oximetry.   -Obesity by BMI, Body mass index is 30.18 kg/m².  -Affecting all aspects of care.  -Encourage lifestyle modifications.     -DVT prophylaxis: Eliquis  -GI prophylaxis:  PPI  -Activity: WBAT  -Disposition plans/anticipated needs: Plans to return home on discharge once medically stable.  -Diet: Regular  -Home supplemental O2: Uses 2L NC PRN at home, currently stable on room air/baseline requirements.         Thank you for the opportunity to participate in the care of your patient. Please do not hesitate to call with any questions or concerns.         LACI Alva  09/08/22  09:14 EDT  Pager #[phone]

## 2022-09-08 NOTE — DISCHARGE PLACEMENT REQUEST
"Vladimir Richards (77 y.o. Female)             Date of Birth   1945    Social Security Number       Address   [address] [place] [address]    Home Phone   [phone]    MRN   [id number]       Worship   Quaker    Marital Status                               Admission Date   9/6/22    Admission Type   Elective    Admitting Provider   Howard Estrada MD    Attending Provider   Howard Estrada MD    Department, Room/Bed   [address], 3326/1P       Discharge Date       Discharge Disposition   Skilled Nursing Facility (DC - External)    Discharge Destination                               Attending Provider: Howard Estrada MD    Allergies: Morphine And Related    Isolation: None   Infection: None   Code Status: CPR   Advance Care Planning Activity    Ht: 167.6 cm (66\")   Wt: 97.4 kg (214 lb 11.7 oz)    Admission Cmt: None   Principal Problem: Osteoarthritis of knee [M17.10]                 Active Insurance as of 9/6/2022     Primary Coverage     Payor Plan Insurance Group Employer/Plan Group    MEDICARE MEDICARE A & B      Payor Plan Address Payor Plan Phone Number Payor Plan Fax Number Effective Dates    [address] [phone]  4/1/2009 - None Entered    [facility] [id number]       Subscriber Name Subscriber Birth Date Member ID       VLADIMIR RICHARDS 1945 [id number]           Secondary Coverage     Payor Plan Insurance Group Employer/Plan Group    [facility] OPT[facility]      Payor Plan Address Payor Plan Phone Number Payor Plan Fax Number Effective Dates    [facility] [phone]  1/1/2016 - None Entered    [address]       [facility] [address]       Subscriber Name Subscriber Birth Date Member ID       VLADIMIR RICHARDS 1945 [id number]                 Emergency Contacts      (Rel.) Home Phone Work Phone Mobile Phone    Everardo Hill (Brother) [phone] -- --    DEANNA HILL (Relative) [phone] -- [phone]    "         Insurance Information                MEDICARE/MEDICARE A & B Phone: [phone]    Subscriber: Raisa Hernández Subscriber#: [id number]    Group#: -- Precert#: --        [facility] HEALTH CARE OPTIONS Phone: [phone]    Subscriber: Raisa Hernández Subscriber#: [id number]    Group#: -- Precert#: --             History & Physical      Howard Estrada MD at 09/06/22 0700        H&P reviewed. The patient was examined and there are no changes to the H&P.    Electronically signed by Howard Estrada MD at 09/06/22 0726   Source Note          History and Physical      Patient: Raisa Hernández  YOB: 1945  Date of Encounter: 08/17/2022      Chief Complaint:   Chief Complaint   Patient presents with   • Right Knee - Osteoarthritis, Follow-up     Surgery update for a right total knee arthroplasty           HPI:   Raisa Hernández, 77 y.o. female, presents in follow-up with continued right knee pain and known advanced osteoarthritis.  She has been considering surgical treatment for her right knee however she recently was admitted to the hospital for pneumonia and was then referred to rehabilitation unit.  She is now home doing much better her breathing is much better.  She wishes to consider total knee replacement.  Has moderate to severe pain with simple ambulation.  She has had a stroke in the past she is anticoagulated with Plavix and aspirin.  She has previously been cleared for surgery..  Her medical history includes pulmonary hypertension, thrombotic stroke and pneumonia.      Active Problem List:  Patient Active Problem List   Diagnosis   • Hypertensive disorder   • Thrombotic stroke (HCC)   • Hyperlipidemia   • Osteoarthritis of knee   • Sleep apnea   • Status post total left knee replacement   • Angina pectoris (HCC)   • Premature atrial contraction   • Diplopia   • Cushingoid side effect of steroids (HCC)   • Leukocytosis   • Neuropathy   • Postmenopausal osteoporosis   • Pulmonary HTN (HCC)    • Squamous cell carcinoma of hand   • Vertical nystagmus   • Essential hypertension   • Pneumonia of right lung due to infectious organism, unspecified part of lung   • Hyponatremia   • Hypomagnesemia   • Chronic anemia   • Elevated CK   • Sepsis with acute respiratory failure without septic shock (HCC)   • Gastroesophageal reflux disease without esophagitis   • Other dysphagia   • Hiatal hernia   • Lower esophageal ring (Schatzki)           Past Medical History:  Past Medical History:   Diagnosis Date   • Arthritis    • Asthma    • Cancer (HCC)    • Elevated cholesterol    • GERD (gastroesophageal reflux disease)    • Hyperlipidemia    • Hypertension 05/16/2016   • Sleep apnea    • Stroke (HCC) 05/16/2016 2009 2015           Past Surgical History:  Past Surgical History:   Procedure Laterality Date   • BRAVO PROCEDURE N/A 6/14/2022    Procedure: ESOPHAGOGASTRODUODENOSCOPY WITH DILITATION  AND WHEELER;  Surgeon: Robbie Winters MD;  Location: [facility];  Service: Gastroenterology;  Laterality: N/A;  GE JUNCTION 37CM  WHEELER PLACED AT 31CM   • CATARACT EXTRACTION     • CHOLECYSTECTOMY     • COLON SURGERY     • KNEE SURGERY Left            Family History:  Family History   Problem Relation Age of Onset   • Heart disease Mother    • Heart disease Father    • Cancer Brother    • Breast cancer Paternal Aunt    • Breast cancer Paternal Aunt            Social History:  Social History     Socioeconomic History   • Marital status:    Tobacco Use   • Smoking status: Never Smoker   • Smokeless tobacco: Never Used   Vaping Use   • Vaping Use: Never used   Substance and Sexual Activity   • Alcohol use: No   • Drug use: No   • Sexual activity: Never     Body mass index is 30.71 kg/m².        Medications:  Current Outpatient Medications   Medication Sig Dispense Refill   • acetaminophen (TYLENOL) 650 MG 8 hr tablet Take 650 mg by mouth 4 (Four) Times a Day.     • aspirin 81 MG EC tablet Take 1 tablet by mouth.     •  clopidogrel (PLAVIX) 75 MG tablet Take 75 mg by mouth Daily.     • cyanocobalamin 1000 MCG/ML injection Inject 1,000 mcg under the skin into the appropriate area as directed Every 30 (Thirty) Days. Middle of the month.     • Diclofenac Sodium (Pennsaid) 2 % solution Apply 1 application topically 2 (Two) Times a Day As Needed (pain).     • Diclofenac Sodium (VOLTAREN) 1 % gel gel Apply 4 g topically to the appropriate area as directed 2 (Two) Times a Day As Needed (Pain).     • folic acid (FOLVITE) 1 MG tablet Take 1 mg by mouth daily.     • ipratropium-albuterol (DUO-NEB) 0.5-2.5 mg/3 ml nebulizer Inhale 3 mL by nebulization 4 (Four) Times a Day. 360 mL 1   • lisinopril (PRINIVIL,ZESTRIL) 5 MG tablet Take 5 mg by mouth Daily.     • Lutein-Zeaxanthin 25-5 MG capsule Take 1 capsule by mouth 2 (Two) Times a Day.     • meclizine (ANTIVERT) 12.5 MG tablet Take 12.5 mg by mouth 3 (Three) Times a Day As Needed for Dizziness.     • multivitamin (THERAGRAN) tablet tablet Take 1 tablet by mouth 2 (Two) Times a Day.     • omeprazole (priLOSEC) 20 MG capsule Take 1 capsule by mouth 2 (Two) Times a Day As Needed (as needed for reflux). 60 capsule 2   • vitamin D (ERGOCALCIFEROL) 1.25 MG (16280 UT) capsule capsule Take 50,000 Units by mouth Every 7 (Seven) Days. Sundays.     • benzonatate (TESSALON) 100 MG capsule Take 1 capsule by mouth 3 (Three) Times a Day As Needed for Cough. 20 capsule 0   • omeprazole (priLOSEC) 20 MG capsule Take 1 capsule by mouth 2 (Two) Times a Day. 60 capsule 1     No current facility-administered medications for this visit.           Allergies:  Allergies   Allergen Reactions   • Morphine And Related Delirium           Review of Systems:   Review of Systems   Constitutional: Negative.    HENT: Negative.    Eyes: Negative.    Respiratory: Positive for shortness of breath.    Cardiovascular: Negative.    Gastrointestinal: Negative.    Endocrine: Negative.    Genitourinary: Negative.    Musculoskeletal:  "Positive for arthralgias, joint swelling and neck pain.   Skin: Negative.    Allergic/Immunologic: Negative.    Neurological: Negative.    Hematological: Bruises/bleeds easily.   Psychiatric/Behavioral: Negative.            Physical Exam:   Physical Exam  Vitals and nursing note reviewed.   Constitutional:       General: She is not in acute distress.     Appearance: She is not diaphoretic.   HENT:      Head: Normocephalic and atraumatic.      Right Ear: External ear normal.      Left Ear: External ear normal.   Eyes:      General:         Right eye: No discharge.         Left eye: No discharge.      Conjunctiva/sclera: Conjunctivae normal.   Cardiovascular:      Rate and Rhythm: Normal rate and regular rhythm.      Heart sounds: Normal heart sounds. No murmur heard.  Pulmonary:      Effort: Pulmonary effort is normal. No respiratory distress.      Breath sounds: Normal breath sounds. No wheezing.   Abdominal:      General: There is no distension.      Palpations: Abdomen is soft.      Tenderness: There is no guarding.   Musculoskeletal:      Cervical back: Normal range of motion and neck supple.   Skin:     General: Skin is warm and dry.      Capillary Refill: Capillary refill takes less than 2 seconds.   Neurological:      Mental Status: She is alert and oriented to person, place, and time.   Psychiatric:         Behavior: Behavior normal.         Thought Content: Thought content normal.         Judgment: Judgment normal.       GENERAL: 77 y.o. female, alert and oriented X 3 in no acute distress.   Visit Vitals  /69   Pulse 67   Ht 167.6 cm (65.98\")   Wt 86.3 kg (190 lb 3.2 oz)   BMI 30.71 kg/m²         Musculoskeletal:   Examination right knee reveals mild effusion and moderate medial joint line tenderness she demonstrates full extension with flexion to 130 degrees with no instability.  Neurovascular exam grossly intact.        Radiology/Labs:    INDICATION:   Right knee pain after a fall     COMPARISON: "   5/11/2022     FINDINGS:  AP, lateral, view(s) of the right knee.  No fracture, dislocation, or effusion. Osteophyte formation is seen at the upper and lower patellar poles. There is moderately severe medial compartment narrowing with medial and lateral joint line osteophytes.  There is varus angulation across the knee. No osteochondral fragments are seen.  No foreign body.     IMPRESSION:  Moderately severe osteoarthritis especially in the medial compartment. No fracture.     Signer Name: Jose Butt MD   Signed: 5/15/2022 9:55 AM   Workstation Name: [id number]    [facility]    Radiographs         Assessment & Plan:   77 y.o. female presents in follow-up with worsening right knee pain she wishes to proceed with proposed total knee arthroplasty.  We will hold aspirin 1 day preop hold Plavix 7 days preop continue her on aspirin postoperatively and begin Eliquis postoperatively.  Surgery is scheduled [facility] September 6, 2022.      ICD-10-CM ICD-9-CM   1. Primary osteoarthritis of right knee  M17.11 715.16           Cc:   Rodrigo Howe DO              This document has been electronically signed by Howard Estrada MD   August 19, 2022 12:12 EDT                  Electronically signed by Howard Estrada MD at 08/22/22 0841             Howard Estrada MD at 08/17/22 0930          History and Physical      Patient: Raisa Hernández  YOB: 1945  Date of Encounter: 08/17/2022      Chief Complaint:   Chief Complaint   Patient presents with   • Right Knee - Osteoarthritis, Follow-up     Surgery update for a right total knee arthroplasty           HPI:   Raisa Hernández, 77 y.o. female, presents in follow-up with continued right knee pain and known advanced osteoarthritis.  She has been considering surgical treatment for her right knee however she recently was admitted to the hospital for pneumonia and was then referred to rehabilitation unit.  She is now home  doing much better her breathing is much better.  She wishes to consider total knee replacement.  Has moderate to severe pain with simple ambulation.  She has had a stroke in the past she is anticoagulated with Plavix and aspirin.  She has previously been cleared for surgery..  Her medical history includes pulmonary hypertension, thrombotic stroke and pneumonia.      Active Problem List:  Patient Active Problem List   Diagnosis   • Hypertensive disorder   • Thrombotic stroke (HCC)   • Hyperlipidemia   • Osteoarthritis of knee   • Sleep apnea   • Status post total left knee replacement   • Angina pectoris (HCC)   • Premature atrial contraction   • Diplopia   • Cushingoid side effect of steroids (HCC)   • Leukocytosis   • Neuropathy   • Postmenopausal osteoporosis   • Pulmonary HTN (HCC)   • Squamous cell carcinoma of hand   • Vertical nystagmus   • Essential hypertension   • Pneumonia of right lung due to infectious organism, unspecified part of lung   • Hyponatremia   • Hypomagnesemia   • Chronic anemia   • Elevated CK   • Sepsis with acute respiratory failure without septic shock (HCC)   • Gastroesophageal reflux disease without esophagitis   • Other dysphagia   • Hiatal hernia   • Lower esophageal ring (Schatzki)           Past Medical History:  Past Medical History:   Diagnosis Date   • Arthritis    • Asthma    • Cancer (HCC)    • Elevated cholesterol    • GERD (gastroesophageal reflux disease)    • Hyperlipidemia    • Hypertension 05/16/2016   • Sleep apnea    • Stroke (HCC) 05/16/2016 2009 2015           Past Surgical History:  Past Surgical History:   Procedure Laterality Date   • BRAVO PROCEDURE N/A 6/14/2022    Procedure: ESOPHAGOGASTRODUODENOSCOPY WITH DILITATION  AND WHEELER;  Surgeon: Robbie Winters MD;  Location: [facility];  Service: Gastroenterology;  Laterality: N/A;  GE JUNCTION 37CM  WHEELER PLACED AT 31CM   • CATARACT EXTRACTION     • CHOLECYSTECTOMY     • COLON SURGERY     • KNEE SURGERY Left             Family History:  Family History   Problem Relation Age of Onset   • Heart disease Mother    • Heart disease Father    • Cancer Brother    • Breast cancer Paternal Aunt    • Breast cancer Paternal Aunt            Social History:  Social History     Socioeconomic History   • Marital status:    Tobacco Use   • Smoking status: Never Smoker   • Smokeless tobacco: Never Used   Vaping Use   • Vaping Use: Never used   Substance and Sexual Activity   • Alcohol use: No   • Drug use: No   • Sexual activity: Never     Body mass index is 30.71 kg/m².        Medications:  Current Outpatient Medications   Medication Sig Dispense Refill   • acetaminophen (TYLENOL) 650 MG 8 hr tablet Take 650 mg by mouth 4 (Four) Times a Day.     • aspirin 81 MG EC tablet Take 1 tablet by mouth.     • clopidogrel (PLAVIX) 75 MG tablet Take 75 mg by mouth Daily.     • cyanocobalamin 1000 MCG/ML injection Inject 1,000 mcg under the skin into the appropriate area as directed Every 30 (Thirty) Days. Middle of the month.     • Diclofenac Sodium (Pennsaid) 2 % solution Apply 1 application topically 2 (Two) Times a Day As Needed (pain).     • Diclofenac Sodium (VOLTAREN) 1 % gel gel Apply 4 g topically to the appropriate area as directed 2 (Two) Times a Day As Needed (Pain).     • folic acid (FOLVITE) 1 MG tablet Take 1 mg by mouth daily.     • ipratropium-albuterol (DUO-NEB) 0.5-2.5 mg/3 ml nebulizer Inhale 3 mL by nebulization 4 (Four) Times a Day. 360 mL 1   • lisinopril (PRINIVIL,ZESTRIL) 5 MG tablet Take 5 mg by mouth Daily.     • Lutein-Zeaxanthin 25-5 MG capsule Take 1 capsule by mouth 2 (Two) Times a Day.     • meclizine (ANTIVERT) 12.5 MG tablet Take 12.5 mg by mouth 3 (Three) Times a Day As Needed for Dizziness.     • multivitamin (THERAGRAN) tablet tablet Take 1 tablet by mouth 2 (Two) Times a Day.     • omeprazole (priLOSEC) 20 MG capsule Take 1 capsule by mouth 2 (Two) Times a Day As Needed (as needed for reflux). 60 capsule 2    • vitamin D (ERGOCALCIFEROL) 1.25 MG (99562 UT) capsule capsule Take 50,000 Units by mouth Every 7 (Seven) Days. Sundays.     • benzonatate (TESSALON) 100 MG capsule Take 1 capsule by mouth 3 (Three) Times a Day As Needed for Cough. 20 capsule 0   • omeprazole (priLOSEC) 20 MG capsule Take 1 capsule by mouth 2 (Two) Times a Day. 60 capsule 1     No current facility-administered medications for this visit.           Allergies:  Allergies   Allergen Reactions   • Morphine And Related Delirium           Review of Systems:   Review of Systems   Constitutional: Negative.    HENT: Negative.    Eyes: Negative.    Respiratory: Positive for shortness of breath.    Cardiovascular: Negative.    Gastrointestinal: Negative.    Endocrine: Negative.    Genitourinary: Negative.    Musculoskeletal: Positive for arthralgias, joint swelling and neck pain.   Skin: Negative.    Allergic/Immunologic: Negative.    Neurological: Negative.    Hematological: Bruises/bleeds easily.   Psychiatric/Behavioral: Negative.            Physical Exam:   Physical Exam  Vitals and nursing note reviewed.   Constitutional:       General: She is not in acute distress.     Appearance: She is not diaphoretic.   HENT:      Head: Normocephalic and atraumatic.      Right Ear: External ear normal.      Left Ear: External ear normal.   Eyes:      General:         Right eye: No discharge.         Left eye: No discharge.      Conjunctiva/sclera: Conjunctivae normal.   Cardiovascular:      Rate and Rhythm: Normal rate and regular rhythm.      Heart sounds: Normal heart sounds. No murmur heard.  Pulmonary:      Effort: Pulmonary effort is normal. No respiratory distress.      Breath sounds: Normal breath sounds. No wheezing.   Abdominal:      General: There is no distension.      Palpations: Abdomen is soft.      Tenderness: There is no guarding.   Musculoskeletal:      Cervical back: Normal range of motion and neck supple.   Skin:     General: Skin is warm and  "dry.      Capillary Refill: Capillary refill takes less than 2 seconds.   Neurological:      Mental Status: She is alert and oriented to person, place, and time.   Psychiatric:         Behavior: Behavior normal.         Thought Content: Thought content normal.         Judgment: Judgment normal.       GENERAL: 77 y.o. female, alert and oriented X 3 in no acute distress.   Visit Vitals  /69   Pulse 67   Ht 167.6 cm (65.98\")   Wt 86.3 kg (190 lb 3.2 oz)   BMI 30.71 kg/m²         Musculoskeletal:   Examination right knee reveals mild effusion and moderate medial joint line tenderness she demonstrates full extension with flexion to 130 degrees with no instability.  Neurovascular exam grossly intact.        Radiology/Labs:    INDICATION:   Right knee pain after a fall     COMPARISON:   5/11/2022     FINDINGS:  AP, lateral, view(s) of the right knee.  No fracture, dislocation, or effusion. Osteophyte formation is seen at the upper and lower patellar poles. There is moderately severe medial compartment narrowing with medial and lateral joint line osteophytes.  There is varus angulation across the knee. No osteochondral fragments are seen.  No foreign body.     IMPRESSION:  Moderately severe osteoarthritis especially in the medial compartment. No fracture.     Signer Name: Jose Butt MD   Signed: 5/15/2022 9:55 AM   Workstation Name: [id number]    [facility]    Radiographs         Assessment & Plan:   77 y.o. female presents in follow-up with worsening right knee pain she wishes to proceed with proposed total knee arthroplasty.  We will hold aspirin 1 day preop hold Plavix 7 days preop continue her on aspirin postoperatively and begin Eliquis postoperatively.  Surgery is scheduled [facility] September 6, 2022.      ICD-10-CM ICD-9-CM   1. Primary osteoarthritis of right knee  M17.11 715.16           Cc:   Rodrigo Howe, DO              This document has been electronically " signed by Howard Estrada MD   August 19, 2022 12:12 EDT                  Electronically signed by Howard Estrada MD at 08/22/22 0841       Lines, Drains & Airways     Active LDAs     Name Placement date Placement time Site Days    Peripheral IV 09/06/22 0650 Left;Posterior Hand 09/06/22  0650  Hand  2                  Current Facility-Administered Medications   Medication Dose Route Frequency Provider Last Rate Last Admin   • acetaminophen (TYLENOL) tablet 1,000 mg  1,000 mg Oral BID PRN Mariah Mora DO   1,000 mg at 09/07/22 0548   • apixaban (ELIQUIS) tablet 2.5 mg  2.5 mg Oral Q12H Howard Estrada MD   2.5 mg at 09/08/22 0848   • aspirin EC tablet 81 mg  81 mg Oral Daily Alejandra Gonzalez, APRN   81 mg at 09/08/22 0848   • atorvastatin (LIPITOR) tablet 20 mg  20 mg Oral Nightly Alejandra Gonzalez, APRN   20 mg at 09/07/22 2107   • carboxymethylcellulose (REFRESH PLUS) 0.5 % ophthalmic solution 1 drop  1 drop Left Eye TID Mariah Mora DO   1 drop at 09/08/22 0848   • carboxymethylcellulose (REFRESH PLUS) 0.5 % ophthalmic solution 2 drop  2 drop Left Eye TID PRN Alejandra Gonzalez, APRN       • [START ON 9/11/2022] cholecalciferol (VITAMIN D3) capsule 50,000 Units  50,000 Units Oral Weekly Mariah Mora DO       • docusate sodium (COLACE) capsule 100 mg  100 mg Oral BID PRN Mariah Mora DO       • folic acid (FOLVITE) tablet 1 mg  1 mg Oral Daily Alejandra Gonzalez, APRN   1 mg at 09/08/22 0848   • HYDROmorphone (DILAUDID) injection 0.5 mg  0.5 mg Intravenous Q2H PRN Howard Estrada MD        And   • naloxone (NARCAN) injection 0.1 mg  0.1 mg Intravenous Q5 Min PRN Howard Estrada MD       • ipratropium-albuterol (DUO-NEB) nebulizer solution 3 mL  3 mL Nebulization 4x Daily - RT Mariah Mora DO   3 mL at 09/08/22 0640   • lisinopril (PRINIVIL,ZESTRIL) tablet 5 mg  5 mg Oral Daily Mariah Mora DO   5 mg at 09/08/22 0848   • meclizine  (ANTIVERT) tablet 12.5 mg  12.5 mg Oral TID PRN Mariah Mora DO       • midazolam (VERSED) injection 0.5 mg  0.5 mg Intravenous Q10 Min PRN Ravin Jamil MD       • multivitamin (THERAGRAN) tablet 1 tablet  1 tablet Oral Daily Alejandra Gonzalez, APRN   1 tablet at 09/08/22 0848   • ondansetron (ZOFRAN) tablet 4 mg  4 mg Oral Q6H PRN Howard Estrada MD        Or   • ondansetron (ZOFRAN) injection 4 mg  4 mg Intravenous Q6H PRN Howard Estrada MD       • oxyCODONE-acetaminophen (PERCOCET) 5-325 MG per tablet 2 tablet  2 tablet Oral Q4H PRN Howard Estrdaa MD   2 tablet at 09/07/22 2107   • pantoprazole (PROTONIX) EC tablet 40 mg  40 mg Oral Q AM Alejandra Gonzalez, APRN   40 mg at 09/08/22 0617   • polyethylene glycol (MIRALAX) packet 17 g  17 g Oral Daily PRN Mariah Mora DO       • sodium chloride 0.9 % flush 10 mL  10 mL Intravenous Q12H Ravin Jamil MD   10 mL at 09/08/22 0849   • sodium chloride 0.9 % flush 10 mL  10 mL Intravenous PRN Ravin Jamil MD         Nursing Assessments (last 24 hours)     Adult PCS Body System     Row Name 09/08/22 0900 09/08/22 0700 09/08/22 0500 09/08/22 0300 09/08/22 0100       Pain/Comfort/Sleep    Preferred Pain Scale number (Numeric Rating Pain Scale) -- -- -- --    (0-10) Pain Rating: Rest 1 -- -- -- --    Pain Location knee -- -- -- --    Pain Side/Orientation right -- -- -- --    Pain Description intermittent -- -- -- --    POSS (Pasero Opioid-Induced Sed Scale) 1 - Awake and alert -- -- -- --    Sleep/Rest/Relaxation no problem identified -- -- -- --       Coping/Psychosocial    Observed Emotional State calm;cooperative -- -- -- --    Verbalized Emotional State acceptance -- -- -- --    Trust Relationship/Rapport care explained;choices provided;emotional support provided;empathic listening provided;questions answered;questions encouraged;reassurance provided;thoughts/feelings acknowledged -- -- -- --    Family/Support Persons  family -- -- -- --    Involvement in Care not present at bedside -- -- -- --    Family/Support System Care self-care encouraged;support provided -- -- -- --    Diversional Activities television;smartphone -- -- -- --       HEENT    HEENT [facility] WDL -- -- -- --       Mouth/Teeth WD    Mouth/Teeth [facility] -- -- -- --       Cognitive    Cognitive/Neuro/Behavioral [facility] -- -- -- --    Level of Consciousness Alert -- -- -- --    Orientation oriented x 4 -- -- -- --       Motor Response    Motor Response general motor response -- -- -- --    General Motor Response purposeful motor response -- -- -- --       Behavior [facility]    Behavior [facility] WDL -- -- -- --       Respiratory    Respiratory WDL WDL -- -- -- --    Rhythm/Pattern, Respiratory no shortness of breath reported;depth regular;pattern regular;unlabored -- -- -- --       Breath Sounds    Breath Sounds All Fields -- -- -- --    All Lung Fields Breath Sounds diminished -- -- -- --       Oxygen Therapy    Flow (L/min) 1 -- -- -- --    Device (Oxygen Therapy) nasal cannula -- -- -- --       Cardiac    Cardiac WDL WDL -- -- -- --       Peripheral Neurovascular    Peripheral Neurovascular WDL .[facility] except;all -- -- -- --    Capillary Refill, General less than/equal to 3 secs -- -- -- --    Capillary Refill, RLE less than/equal to 3 secs -- -- -- --       All Extremities Neurovascular Assessment    General Temperature All Extremities warm -- -- -- --    General Color All Extremities no discoloration -- -- -- --    General Sensation All Extremities no numbness;no tingling;no tenderness -- -- -- --       RLE Neurovascular Assessment    Temperature RLE warm -- -- -- --    Color RLE no discoloration -- -- -- --    Sensation RLE no numbness;no tingling;tenderness present -- -- -- --       Pulse Dorsalis Pedis    Left Dorsalis Pedis Pulse 2+ (normal) -- -- -- --    Right Dorsalis Pedis Pulse 2+ (normal) -- -- -- --       Edema    Edema knee, right;leg, right -- -- -- --    Leg, Right  Edema 2+ (Mild) -- -- -- --    Knee, Right Edema 3+ (Moderate) -- -- -- --       Gastrointestinal    Gastrointestinal WDL WDL -- -- -- --    Abdominal Appearance nondistended -- -- -- --    Bowel Sounds All Quadrants -- -- -- --    All Quadrants Bowel Sounds audible;normoactive -- -- -- --       Genitourinary    Genitourinary WDL WDL -- -- -- --       Skin    Skin WDL .WDL except;all -- -- -- --    Skin Color/Characteristics without discoloration -- -- -- --    Skin Temperature warm -- -- -- --    Skin Moisture dry -- -- -- --    Skin Elasticity quick return to original state -- -- -- --    Skin Integrity incision -- -- -- --       [place] Risk Assessment    Sensory Perception 3-->slightly limited -- -- -- --    Moisture 3-->occasionally moist -- -- -- --    Activity 3-->walks occasionally -- -- -- --    Mobility 3-->slightly limited -- -- -- --    Nutrition 3-->adequate -- -- -- --    Friction and Shear 2-->potential problem -- -- -- --    Mushtaq Score 17 -- -- -- --       Wound 09/06/22 [facility] Right anterior knee Incision    Wound - Properties Group Placement Date: 09/06/22 Placement Time: 0758 Side: Right Orientation: anterior Location: knee Primary Wound Type: Incision    Dressing Appearance dry;intact -- -- -- --    Closure Unable to assess -- -- -- --    Base dressing in place, unable to visualize -- -- -- --    Drainage Amount none -- -- -- --    Retired Wound - Properties Group Placement Date: 09/06/22 Placement Time: 0758 Side: Right Orientation: anterior Location: knee Primary Wound Type: Incision    Retired Wound - Properties Group Date first assessed: 09/06/22 Time first assessed: 0758 Side: Right Location: knee Primary Wound Type: Incision       Skin Interventions    Pressure Reduction Devices pressure-redistributing mattress utilized -- -- -- --    Pressure Reduction Techniques frequent weight shift encouraged -- -- -- --    Skin Protection adhesive use limited -- -- -- --       Musculoskeletal     Musculoskeletal WDL .WDL except;mobility -- -- -- --    General Mobility mildly impaired -- -- -- --       Functional Screen (every 3 days/change)    Ambulation 3 - assistive equipment and person -- -- -- --    Transferring 3 - assistive equipment and person -- -- -- --    Toileting 2 - assistive person -- -- -- --    Bathing 2 - assistive person -- -- -- --    Dressing 2 - assistive person -- -- -- --    Eating 0 - independent -- -- -- --    Communication 0 - understands/communicates without difficulty -- -- -- --    Swallowing 0 - swallows foods/liquids without difficulty -- -- -- --       Nutrition    Diet/Nutrition Received regular -- -- -- --       Peripheral IV 09/06/22 0650 Left;Posterior Hand    IV Properties Placement Date: 09/06/22 Placement Time: 0650 Hand Hygiene Completed: Yes Size (Gauge): 20 G Orientation: Left;Posterior Location: Hand Site Prep: Alcohol Local Anesthetic: None Technique: Anatomical landmarks Inserted by: t defevers rn Total insertion attempts: 1 Patient Tolerance: Tolerated well    Site Assessment Clean;Dry;Intact -- -- -- --    Dressing Type Transparent -- -- -- --    Line Status Saline locked -- -- -- --    Dressing Status Clean;Dry;Intact -- -- -- --    Reason Not Rotated Not due -- -- -- --    Phlebitis 0-->no symptoms -- -- -- --       Sepsis Screening Options    Which Sepsis Screen to be Used? Adult Sepsis Screen -- -- -- --       Adult Sepsis Screening Tool    Previous adult screen positive in last 48 hrs? no -- -- -- --    Are 2 or > of the above criteria present? no: STOP/negative screen -- -- -- --       Safety    Safety WDL WDL -- WDL WDL WDL    Safety Factors bed in low position;call light in reach;ID band on;upper side rails raised x 2;wheels locked -- -- -- --    All Alarms alarm(s) activated and audible -- -- -- --       [facility] High Risk Falls Assessment (If Fall score is >/=13, add the Fall Risk CPG to the care plan)     Fallen in past 6 months 5--> Yes -- --  -- --    Mental Status 0--> no mental status change -- -- -- --    Elimination 0--> No elimination issues -- -- -- --    Mobility 2--> Requires assistance- transfer, walker, etc. -- -- -- --    Medications 1--> Narcotics -- -- -- --    Nurses' Clinical Judgement 10 -- -- -- --    Total Fall Risk Score 20 -- -- -- --       Safety Management    Safety Promotion/Fall Prevention activity supervised;assistive device/personal items within reach;clutter free environment maintained;fall prevention program maintained;nonskid shoes/slippers when out of bed;safety round/check completed safety round/check completed safety round/check completed safety round/check completed safety round/check completed    Enhanced Safety Measures bed alarm set -- -- -- --    Medication Review/Management medications reviewed -- -- -- --       Daily Care    Highest level of mobility 5 --> Static standing -- -- -- --       How much help from another person do you currently need...    Turning from your back to your side while in flat bed without using bedrails? a little -- -- -- --    Moving from lying on back to sitting on the side of a flat bed without bedrails? a little -- -- -- --    Moving to and from a bed to a chair (including a wheelchair)? a little -- -- -- --    Standing up from a chair using your arms (e.g., wheelchair, bedside chair)? a little -- -- -- --    Climbing 3-5 steps with a railing? a lot -- -- -- --    To walk in hospital room? a little -- -- -- --    AM-PAC 6 Clicks Score (PT) 17 -- -- -- --    Row Name 09/07/22 2300 09/07/22 2137 09/07/22 2110 09/07/22 2100 09/07/22 1900       Pain/Comfort/Sleep    Preferred Pain Scale -- -- number (Numeric Rating Pain Scale) -- --    (0-10) Pain Rating: Rest -- -- 8 -- --    Pain Location -- -- knee -- --    Pain Side/Orientation -- -- right -- --    Pain Description -- -- intermittent -- --    Pain Management Interventions -- -- see MAR -- --    Response to Pain Interventions --  interventions effective per patient interventions effective per patient -- --    POSS ([place]ero Opioid-Induced Sed Scale) -- -- 1 - Awake and alert -- --    Sleep/Rest/Relaxation -- -- no problem identified -- --       Coping/Psychosocial    Observed Emotional State -- -- calm;cooperative -- --    Verbalized Emotional State -- -- acceptance -- --    Trust Relationship/Rapport -- -- care explained;choices provided;thoughts/feelings acknowledged -- --    Family/Support Persons -- -- family -- --    Involvement in Care -- -- not present at bedside -- --    Family/Support System Care -- -- self-care encouraged;support provided -- --    Diversional Activities -- -- television -- --       HEENT    HEENT WDL -- -- WDL -- --       Mouth/Teeth WDL    Mouth/Teeth WDL -- -- WDL -- --       Cognitive    Cognitive/Neuro/Behavioral WDL -- -- WDL -- --    Level of Consciousness -- -- Alert -- --    Orientation -- -- oriented x 4 -- --       Cognitive Interventions    Communication Enhancement Strategies -- -- call light answered in person -- --       Motor Response    Motor Response -- -- general motor response -- --    General Motor Response -- -- purposeful motor response -- --       Behavior WDL    Behavior WDL -- -- WDL -- --       Respiratory    Respiratory WDL -- -- WDL -- --    Rhythm/Pattern, Respiratory -- -- shortness of breath  exertion -- --    Cough And Deep Breathing -- -- done independently per patient -- --       Breath Sounds    Breath Sounds -- -- All Fields -- --    All Lung Fields Breath Sounds -- -- Anterior:;diminished -- --       Incentive Spirometer (IS)    Incentive Spirometer Predicted Level (mL) -- -- 2000 -- --    Administration (IS) -- -- self-administered -- --    Number of Repetitions (IS) -- -- 5 -- --    Level Incentive Spirometer (mL) -- -- 2000 -- --    Patient Tolerance (IS) -- -- fair -- --       Oxygen Therapy    Flow (L/min) -- -- 1 -- --    Device (Oxygen Therapy) -- -- nasal cannula -- --        Cardiac    Cardiac WDL -- -- WDL -- --    Cardiac Rhythm -- -- apical pulse regular -- --       Peripheral Neurovascular    Peripheral Neurovascular WDL -- -- .WDL except;all -- --    Capillary Refill, General -- -- less than/equal to 3 secs -- --    Capillary Refill, LUE -- -- less than/equal to 3 secs -- --    Capillary Refill, RUE -- -- less than/equal to 3 secs -- --    Capillary Refill, LLE -- -- less than/equal to 3 secs -- --    Capillary Refill, RLE -- -- less than/equal to 3 secs -- --    Pulse Assessment -- -- dorsalis pedis;radial -- --    VTE Prevention/Management -- -- left;sequential compression devices on -- --       All Extremities Neurovascular Assessment    General Temperature All Extremities -- -- warm -- --    General Color All Extremities -- -- no discoloration -- --    General Sensation All Extremities -- -- no numbness;no tingling;no tenderness -- --       LUE Neurovascular Assessment    Temperature LUE -- -- warm -- --    Color LUE -- -- no discoloration -- --    Sensation LUE -- -- no tingling;no tenderness -- --       RUE Neurovascular Assessment    Temperature RUE -- -- warm -- --    Color RUE -- -- no discoloration -- --    Sensation RUE -- -- no tenderness;no tingling -- --       LLE Neurovascular Assessment    Temperature LLE -- -- warm -- --    Color LLE -- -- no discoloration -- --    Sensation LLE -- -- no tenderness;no tingling -- --       RLE Neurovascular Assessment    Temperature RLE -- -- warm -- --    Color RLE -- -- no discoloration -- --    Sensation RLE -- -- no tenderness;no tingling -- --       Pulse Radial    Left Radial Pulse -- -- 2+ (normal) -- --    Right Radial Pulse -- -- 2+ (normal) -- --       Pulse Dorsalis Pedis    Left Dorsalis Pedis Pulse -- -- 2+ (normal) -- --    Right Dorsalis Pedis Pulse -- -- 2+ (normal) -- --       Edema    Edema -- -- knee, right;leg, right -- --    Leg, Right Edema -- -- 2+ (Mild) -- --    Knee, Right Edema -- -- 3+ (Moderate) -- --        Gastrointestinal    Gastrointestinal WDL -- -- WDL -- --    Abdominal Appearance -- -- nondistended -- --    Bowel Sounds -- -- All Quadrants -- --    All Quadrants Bowel Sounds -- -- audible;normoactive -- --    Last Bowel Movement -- -- 09/08/22 -- --       Genitourinary    Genitourinary WDL -- -- WDL -- --       Skin    Skin WDL -- -- .WDL except;all -- --    Skin Color/Characteristics -- -- without discoloration -- --    Skin Temperature -- -- warm -- --    Skin Moisture -- -- dry -- --    Skin Elasticity -- -- quick return to original state -- --    Skin Integrity -- -- incision -- --       Mus[place]aq Risk Assessment    Sensory Perception -- -- 3-->slightly limited -- --    Moisture -- -- 3-->occasionally moist -- --    Activity -- -- 3-->walks occasionally -- --    Mobility -- -- 3-->slightly limited -- --    Nutrition -- -- 3-->adequate -- --    Friction and Shear -- -- 2-->potential problem -- --    Mushtaq Score -- -- 17 -- --       Wound 09/06/22 0758 Right anterior knee Incision    Wound - Properties Group Placement Date: 09/06/22 Placement Time: 0758 Side: Right Orientation: anterior Location: knee Primary Wound Type: Incision    Dressing Appearance -- -- dry;intact -- --    Closure -- -- Unable to assess -- --    Base -- -- dressing in place, unable to visualize -- --    Periwound -- -- intact;dry -- --    Drainage Amount -- -- none -- --    Retired Wound - Properties Group Placement Date: 09/06/22 Placement Time: 0758 Side: Right Orientation: anterior Location: knee Primary Wound Type: Incision    Retired Wound - Properties Group Date first assessed: 09/06/22 Time first assessed: 0758 Side: Right Location: knee Primary Wound Type: Incision       Skin Interventions    Pressure Reduction Devices -- -- pressure-redistributing mattress utilized -- --    Pressure Reduction Techniques -- -- frequent weight shift encouraged;weight shift assistance provided -- --    Skin Protection -- -- adhesive use limited  -- --       Musculoskeletal    Musculoskeletal WDL -- -- .WDL except;mobility -- --    General Mobility -- -- mildly impaired -- --    Range of Motion -- -- active ROM (range of motion) encouraged -- --       Functional Screen (every 3 days/change)    Ambulation -- -- 3 - assistive equipment and person -- --    Transferring -- -- 3 - assistive equipment and person -- --    Toileting -- -- 2 - assistive person -- --    Bathing -- -- 2 - assistive person -- --    Dressing -- -- 2 - assistive person -- --    Eating -- -- 0 - independent -- --    Communication -- -- 0 - understands/communicates without difficulty -- --    Swallowing -- -- 0 - swallows foods/liquids without difficulty -- --       Nutrition    Diet/Nutrition Received -- -- regular -- --    Nutrition Risk Screen -- -- no indicators present -- --       Nutrition Interventions    Glycemic Management -- -- oral hydration promoted -- --    Oral Nutrition Promotion -- -- rest periods promoted -- --       Peripheral IV 09/06/22 0650 Left;Posterior Hand    IV Properties Placement Date: 09/06/22 Placement Time: 0650 Hand Hygiene Completed: Yes Size (Gauge): 20 G Orientation: Left;Posterior Location: Hand Site Prep: Alcohol Local Anesthetic: None Technique: Anatomical landmarks Inserted by: t defevers rn Total insertion attempts: 1 Patient Tolerance: Tolerated well    Site Assessment -- -- Clean;Dry;Intact -- --    Dressing Type -- -- Transparent -- --    Line Status -- -- Saline locked -- --    Dressing Status -- -- Clean;Dry;Intact -- --    Reason Not Rotated -- -- Not due -- --    Phlebitis -- -- 0-->no symptoms -- --       Sepsis Screening Options    Which Sepsis Screen to be Used? -- -- Adult Sepsis Screen -- --       Adult Sepsis Screening Tool    Previous adult screen positive in last 48 hrs? -- -- no -- --    Are 2 or > of the above criteria present? -- -- no: STOP/negative screen -- --       Safety    Safety WDL WDL -- WDL WDL WDL    Safety Factors -- --  wheels locked;upper side rails raised x 2;call light in reach;ID band on;bed in low position -- --    All Alarms -- -- alarm(s) activated and audible -- --    Infection Prevention -- -- rest/sleep promoted -- --       [facility] High Risk Falls Assessment (If Fall score is >/=13, add the Fall Risk CPG to the care plan)     Fallen in past 6 months -- -- 5--> Yes -- --    Mental Status -- -- 0--> no mental status change -- --    Elimination -- -- 0--> No elimination issues -- --    Mobility -- -- 2--> Requires assistance- transfer, walker, etc.;2--> New mobility issue -- --    Medications -- -- 1--> Narcotics -- --    Nurses' Clinical Judgement -- -- 8 -- --    Total Fall Risk Score -- -- 20 -- --       Safety Management    Safety Promotion/Fall Prevention safety round/check completed -- activity supervised;assistive device/personal items within reach;clutter free environment maintained;nonskid shoes/slippers when out of bed;room organization consistent;safety round/check completed safety round/check completed safety round/check completed    Enhanced Safety Measures -- -- bed alarm set -- --    Medication Review/Management -- -- medications reviewed -- --    Row Name 09/07/22 1710 09/07/22 1700 09/07/22 1500 09/07/22 1300 09/07/22 1244       Pain/Comfort/Sleep    Response to Pain Interventions interventions effective per patient -- -- -- --       Oxygen Therapy    Device (Oxygen Therapy) -- -- -- -- room air       Wound 09/06/22 0758 Right anterior knee Incision    Wound - Properties Group Placement Date: 09/06/22 Placement Time: 0758 Side: Right Orientation: anterior Location: knee Primary Wound Type: Incision    Retired Wound - Properties Group Placement Date: 09/06/22 Placement Time: 0758 Side: Right Orientation: anterior Location: knee Primary Wound Type: Incision    Retired Wound - Properties Group Date first assessed: 09/06/22 Time first assessed: 0758 Side: Right Location: knee Primary Wound Type: Incision        Peripheral IV 09/06/22 0650 Left;Posterior Hand    IV Properties Placement Date: 09/06/22 Placement Time: 0650 Hand Hygiene Completed: Yes Size (Gauge): 20 G Orientation: Left;Posterior Location: Hand Site Prep: Alcohol Local Anesthetic: None Technique: Anatomical landmarks Inserted by: t defevers rn Total insertion attempts: 1 Patient Tolerance: Tolerated well       Safety Management    Safety Promotion/Fall Prevention -- safety round/check completed safety round/check completed safety round/check completed --    Row Name 09/07/22 1239                   Pain/Comfort/Sleep    Response to Pain Interventions interventions effective per patient                  Wound 09/06/22 0758 Right anterior knee Incision    Wound - Properties Group Placement Date: 09/06/22 Placement Time: 0758 Side: Right Orientation: anterior Location: knee Primary Wound Type: Incision        Retired Wound - Properties Group Placement Date: 09/06/22 Placement Time: 0758 Side: Right Orientation: anterior Location: knee Primary Wound Type: Incision        Retired Wound - Properties Group Date first assessed: 09/06/22 Time first assessed: 0758 Side: Right Location: knee Primary Wound Type: Incision                  Peripheral IV 09/06/22 0650 Left;Posterior Hand    IV Properties Placement Date: 09/06/22 Placement Time: 0650 Hand Hygiene Completed: Yes Size (Gauge): 20 G Orientation: Left;Posterior Location: Hand Site Prep: Alcohol Local Anesthetic: None Technique: Anatomical landmarks Inserted by: t defevers rn Total insertion attempts: 1 Patient Tolerance: Tolerated well                  ADL Documentation (last day)     Date/Time Transferring Toileting Bathing Dressing Eating Communication Swallowing    09/08/22 0900 3 - assistive equipment and person 2 - assistive person 2 - assistive person 2 - assistive person 0 - independent 0 - understands/communicates without difficulty 0 - swallows foods/liquids without difficulty    09/07/22 2110 3 -  assistive equipment and person 2 - assistive person 2 - assistive person 2 - assistive person 0 - independent 0 - understands/communicates without difficulty 0 - swallows foods/liquids without difficulty    09/07/22 0900 3 - assistive equipment and person 2 - assistive person 2 - assistive person 2 - assistive person 0 - independent 0 - understands/communicates without difficulty 0 - swallows foods/liquids without difficulty               Discharge Summary      Howard Estrada MD at 09/08/22 1205          Raisa Hernández  1945  [id number]      Date of Discharge:  9/8/2022      Discharge Diagnosis:   Osteoarthritis right knee    Procedures Performed  Procedure(s):  RIGHT TOTAL KNEE ARTHROPLASTY         Hospital Course  Patient is a 77 y.o. female who underwent right total knee arthroplasty the date of her admission September 6, 2022.  Postoperatively she was begun on Eliquis 2.5 mg twice daily and has continued.  She is discharged on her second postoperative day doing well ambulating full weightbearing to her right knee.  At time of discharge right knee incision is intact dressing is dry no surrounding erythema.  She is to continue weightbearing as tolerated.  She is provided Percocet 5 mg #30 she will also continue on her aspirin 81 mg daily.  She will be seen back orthopedic clinic in 2 weeks.      Consults:   Consults     Date and Time Order Name Status Description    9/6/2022 12:55 PM Inpatient Consult to Hospitalist Completed             Condition on Discharge:  Stable      Vital Signs  Vitals:    09/08/22 0300 09/08/22 0600 09/08/22 0640 09/08/22 0653   BP: 136/60 129/57     BP Location: Left arm Left arm     Patient Position: Lying Lying     Pulse: 98 91 84 86   Resp: 18 20 16 16   Temp:  98.3 °F (36.8 °C)     TempSrc:  Oral     SpO2:  93% 98% 92%   Weight:       Height:             [facility]        Discharge Medications     Discharge Medications      ASK your doctor  about these medications      Instructions Start Date   acetaminophen 650 MG 8 hr tablet  Commonly known as: TYLENOL   1,300 mg, Oral, 2 Times Daily PRN      aspirin 81 MG EC tablet   81 mg, Oral, Daily      atorvastatin 20 MG tablet  Commonly known as: LIPITOR   20 mg, Oral, Nightly      clopidogrel 75 MG tablet  Commonly known as: PLAVIX   75 mg, Oral, Daily      cyanocobalamin 1000 MCG/ML injection   1,000 mcg, Subcutaneous, Every 30 Days      folic acid 1 MG tablet  Commonly known as: FOLVITE   1 mg, Oral, Daily      ipratropium-albuterol 0.5-2.5 mg/3 ml nebulizer  Commonly known as: DUO-NEB   Inhale 3 mL by nebulization 4 (Four) Times a Day.      lisinopril 5 MG tablet  Commonly known as: PRINIVIL,ZESTRIL   5 mg, Oral, Daily      meclizine 12.5 MG tablet  Commonly known as: ANTIVERT   12.5 mg, Oral, 3 Times Daily PRN      multivitamin tablet tablet   1 tablet, Oral, Daily      omeprazole 20 MG capsule  Commonly known as: priLOSEC   20 mg, Oral, 2 Times Daily      vitamin D 1.25 MG (56497 UT) capsule capsule  Commonly known as: ERGOCALCIFEROL   50,000 Units, Oral, Every 7 Days, Sundays.               Follow-up Appointments  Future Appointments   Date Time Provider Department Center   9/19/2022 10:10 AM Felix Estrada MD [facility]         Felix Estrada MD   09/08/22   12:05 EDT     Electronically signed by Felix Estrada MD at 09/08/22 1208       Discharge Order (From admission, onward)     Start     Ordered    09/08/22 1159  Discharge patient  Once        Expected Discharge Date: 09/08/22    Discharge Disposition: Skilled Nursing Facility (DC - External)    Physician of Record for Attribution - Please select from Treatment Team: FELIX ESTRADA [[id number]]    Review needed by CMO to determine Physician of Record: No       Question Answer Comment   Physician of Record for Attribution - Please select from Treatment Team FELIX ESTRADA    Review needed by CMO to determine  Physician of Record No        09/08/22 1214

## 2022-09-08 NOTE — DISCHARGE INSTR - ACTIVITY
Ice to incision for 48 hours, then as needed for edema, after activity or exercise, and to lessen discomfort.    Weight Bearing Status right knee As Tolerated with walker.

## 2022-09-08 NOTE — CASE MANAGEMENT/SOCIAL WORK
Discharge Planning Assessment   [place]     Patient Name: Raisa Hernández  MRN: [id number]  Today's Date: 9/8/2022    Admit Date: 9/6/2022       Discharge Plan     Row Name 09/08/22 1224       Plan    Final Discharge Disposition Code 03 - skilled nursing facility (SNF)    Final Note Pt is being discharged to [facility] on this date 9/8/22. SS faxed information to [facility] [phone]. SS to provide RN with number to call report. SS notified family of discharge. Pt's brother, Everardo to provide transportation.              Continued Care and Services - Admitted Since 9/6/2022     Destination Coordination complete.    Service Provider Request Status Selected Services Address Phone Fax Patient Preferred    [facility]   Selected Skilled Nursing [facility] [address] [place] [address] [phone] --                SAILAJA Young

## 2022-09-08 NOTE — THERAPY TREATMENT NOTE
Acute Care - Physical Therapy Treatment Note   Etters     Patient Name: Raisa Hernández  : 1945  MRN: [id number]  Today's Date: 2022   Onset of Illness/Injury or Date of Surgery: 22  Visit Dx:     ICD-10-CM ICD-9-CM   1. Primary osteoarthritis of right knee  M17.11 715.16     Patient Active Problem List   Diagnosis   • Hypertensive disorder   • Thrombotic stroke (HCC)   • Hyperlipidemia   • Sleep apnea   • Status post total left knee replacement   • Angina pectoris (HCC)   • Premature atrial contraction   • Diplopia   • Cushingoid side effect of steroids (HCC)   • Leukocytosis   • Neuropathy   • Postmenopausal osteoporosis   • Pulmonary HTN (HCC)   • Squamous cell carcinoma of hand   • Vertical nystagmus   • Essential hypertension   • Pneumonia of right lung due to infectious organism, unspecified part of lung   • Hyponatremia   • Hypomagnesemia   • Chronic anemia   • Elevated CK   • Sepsis with acute respiratory failure without septic shock (HCC)   • Gastroesophageal reflux disease without esophagitis   • Other dysphagia   • Hiatal hernia   • Lower esophageal ring (Schatzki)   • Primary osteoarthritis of right knee     Past Medical History:   Diagnosis Date   • Arthritis    • Asthma    • Cancer (HCC)     colon   • Elevated cholesterol    • GERD (gastroesophageal reflux disease)    • Hyperlipidemia    • Hypertension 2016   • Sleep apnea    • Stroke (HCC) 2016     Past Surgical History:   Procedure Laterality Date   • BRAVO PROCEDURE N/A 2022    Procedure: ESOPHAGOGASTRODUODENOSCOPY WITH DILITATION  AND WHEELER;  Surgeon: Robbie Winters MD;  Location: [facility];  Service: Gastroenterology;  Laterality: N/A;  GE JUNCTION 37CM  WHEELER PLACED AT 31CM   • CATARACT EXTRACTION Bilateral    • CHOLECYSTECTOMY     • COLON SURGERY     • KNEE SURGERY Left     arthroplasty     PT Assessment (last 12 hours)     PT Evaluation and Treatment     Row Name 22 1120          Physical  Therapy Time and Intention    Subjective Information complains of;pain  -HR     Document Type therapy note (daily note)  -HR     Mode of Treatment physical therapy  -HR     Patient Effort good  -HR     Comment Pt and RN Eun in agreement for PT. Pt reports pain throughout Rx but does well. Pt walked 85' with RW CGA but demonstrated a very decreased Gt speed and a step-to Gt pattern. Sitting exercises up in chair after walking,until tolerance.  -HR     Row Name 09/08/22 1120          General Information    Patient Profile Reviewed yes  -HR     Equipment Currently Used at Home cane, straight;walker, rolling  -HR     Existing Precautions/Restrictions fall  -HR     Row Name 09/08/22 1120          Pain    Pretreatment Pain Rating 3/10  -HR     Posttreatment Pain Rating 4/10  -HR     Row Name 09/08/22 1120          Cognition    Affect/Mental Status (Cognition) WFL  -HR     Orientation Status (Cognition) oriented x 4  -HR     Follows Commands (Cognition) WFL  -HR     Personal Safety Interventions fall prevention program maintained;gait belt;nonskid shoes/slippers when out of bed;supervised activity  -HR     Row Name 09/08/22 1120          Mobility    Extremity Weight-bearing Status right lower extremity  -HR     Right Lower Extremity (Weight-bearing Status) weight-bearing as tolerated (WBAT)  -HR     Row Name 09/08/22 1120          Transfers    Transfers sit-stand transfer;stand-sit transfer  -HR     Sit-Stand [place] (Transfers) contact guard  -HR     Stand-Sit [place] (Transfers) contact guard  -HR     Row Name 09/08/22 1120          Sit-Stand Transfer    Assistive Device (Sit-Stand Transfers) walker, front-wheeled  -HR     Row Name 09/08/22 1120          Stand-Sit Transfer    Assistive Device (Stand-Sit Transfers) walker, front-wheeled  -HR     Row Name 09/08/22 1120          Gait/Stairs (Locomotion)    [place] Level (Gait) contact guard  -HR     Assistive Device (Gait) walker, front-wheeled  -HR      Distance in Feet (Gait) 85'  -HR     Pattern (Gait) step-to  -HR     Deviations/Abnormal Patterns (Gait) gait speed decreased;weight shifting decreased  -HR     Row Name 09/08/22 1120          Safety Issues, Functional Mobility    Impairments Affecting Function (Mobility) strength  -HR     Row Name 09/08/22 1120          Motor Skills    Therapeutic Exercise other (see comments)  Sitting: AP, LAQ, march, Knees in/out.  -HR     Row Name             Wound 09/06/22 0758 Right anterior knee Incision    Wound - Properties Group Placement Date: 09/06/22 -KH Placement Time: 0758 -KH Side: Right  -KH Orientation: anterior  -KH Location: knee  -KH Primary Wound Type: Incision  -KH     Retired Wound - Properties Group Placement Date: 09/06/22  -KH Placement Time: 0758 -KH Side: Right  -KH Orientation: anterior  -KH Location: knee  -KH Primary Wound Type: Incision  -KH     Retired Wound - Properties Group Date first assessed: 09/06/22  - Time first assessed: 0758 -KH Side: Right  -KH Location: knee  -KH Primary Wound Type: Incision  -KH     Row Name 09/08/22 1120          Coping    Observed Emotional State calm;cooperative  -HR     Verbalized Emotional State acceptance  -HR     Row Name 09/08/22 1120          Positioning and Restraints    Pre-Treatment Position sitting in chair/recliner  -HR     Post Treatment Position chair  -HR     In Chair sitting;call light within reach;encouraged to call for assist  -HR     Row Name 09/08/22 1120          Therapy Assessment/Plan (PT)    Rehab Potential (PT) good, to achieve stated therapy goals  -HR     Criteria for Skilled Interventions Met (PT) yes;skilled treatment is necessary  -HR     Therapy Frequency (PT) 6 times/wk  1-2 times/day; 5-6 times/wk  -HR     Row Name 09/08/22 1120          Physical Therapy Goals    Bed Mobility Goal Selection (PT) bed mobility, PT goal 1  -HR     Transfer Goal Selection (PT) transfer, PT goal 1  -HR     Gait Training Goal Selection (PT) gait  training, PT goal 1  -HR     Row Name 09/08/22 1120          Bed Mobility Goal 1 (PT)    Activity/Assistive Device (Bed Mobility Goal 1, PT) bed mobility activities, all  -HR     [place] Level/Cues Needed (Bed Mobility Goal 1, PT) supervision required  -HR     Time Frame (Bed Mobility Goal 1, PT) by discharge  -HR     Row Name 09/08/22 1120          Transfer Goal 1 (PT)    Activity/Assistive Device (Transfer Goal 1, PT) sit-to-stand/stand-to-sit;bed-to-chair/chair-to-bed  -HR     [place] Level/Cues Needed (Transfer Goal 1, PT) supervision required  -HR     Time Frame (Transfer Goal 1, PT) by discharge  -HR     Row Name 09/08/22 1120          Gait Training Goal 1 (PT)    Activity/Assistive Device (Gait Training Goal 1, PT) gait (walking locomotion);assistive device use  -HR     [place] Level (Gait Training Goal 1, PT) supervision required  -HR     Distance (Gait Training Goal 1, PT) 90  -HR     Time Frame (Gait Training Goal 1, PT) by discharge  -HR           User Key  (r) = Recorded By, (t) = Taken By, (c) = Cosigned By    Initials Name Provider Type    Christin Rico RN Registered Nurse    HR Cassandra Rico PTA Physical Therapist Assistant                  PT Recommendation and Plan  Anticipated Discharge Disposition (PT): skilled nursing facility (for short term rehab)  Therapy Frequency (PT): 6 times/wk (1-2 times/day; 5-6 times/wk)          Time Calculation:    PT Charges     Row Name 09/08/22 1124             Time Calculation    PT Received On 09/08/22  -HR              Time Calculation- PT    Total Timed Code Minutes- PT 39 minute(s)  -HR            User Key  (r) = Recorded By, (t) = Taken By, (c) = Cosigned By    Initials Name Provider Type    HR Cassandra Rico PTA Physical Therapist Assistant              Therapy Charges for Today     Code Description Service Date Service Provider Modifiers Qty    15368837614 HC GAIT TRAINING EA 15 MIN 9/8/2022 Cassandra Rico PTA GP, CQ 2    [id number]  HC PT THER PROC EA 15 MIN 9/8/2022 Cassandra Rico, COLTON GP, CQ 1          PT G-Codes  AM-PAC 6 Clicks Score (PT): 17    Cassandra Rico PTA  9/8/2022

## 2022-09-08 NOTE — DISCHARGE SUMMARY
Raisa Hernández  1945  [id number]      Date of Discharge:  9/8/2022      Discharge Diagnosis:   Osteoarthritis right knee    Procedures Performed  Procedure(s):  RIGHT TOTAL KNEE ARTHROPLASTY         Hospital Course  Patient is a 77 y.o. female who underwent right total knee arthroplasty the date of her admission September 6, 2022.  Postoperatively she was begun on Eliquis 2.5 mg twice daily and has continued.  She is discharged on her second postoperative day doing well ambulating full weightbearing to her right knee.  At time of discharge right knee incision is intact dressing is dry no surrounding erythema.  She is to continue weightbearing as tolerated.  She is provided Percocet 5 mg #30 she will also continue on her aspirin 81 mg daily.  She will be seen back orthopedic clinic in 2 weeks.      Consults:   Consults     Date and Time Order Name Status Description    9/6/2022 12:55 PM Inpatient Consult to Hospitalist Completed             Condition on Discharge:  Stable      Vital Signs  Vitals:    09/08/22 0300 09/08/22 0600 09/08/22 0640 09/08/22 0653   BP: 136/60 129/57     BP Location: Left arm Left arm     Patient Position: Lying Lying     Pulse: 98 91 84 86   Resp: 18 20 16 16   Temp:  98.3 °F (36.8 °C)     TempSrc:  Oral     SpO2:  93% 98% 92%   Weight:       Height:             [facility]        Discharge Medications     Discharge Medications      ASK your doctor about these medications      Instructions Start Date   acetaminophen 650 MG 8 hr tablet  Commonly known as: TYLENOL   1,300 mg, Oral, 2 Times Daily PRN      aspirin 81 MG EC tablet   81 mg, Oral, Daily      atorvastatin 20 MG tablet  Commonly known as: LIPITOR   20 mg, Oral, Nightly      clopidogrel 75 MG tablet  Commonly known as: PLAVIX   75 mg, Oral, Daily      cyanocobalamin 1000 MCG/ML injection   1,000 mcg, Subcutaneous, Every 30 Days      folic acid 1 MG tablet  Commonly known as: FOLVITE   1 mg, Oral, Daily       ipratropium-albuterol 0.5-2.5 mg/3 ml nebulizer  Commonly known as: DUO-NEB   Inhale 3 mL by nebulization 4 (Four) Times a Day.      lisinopril 5 MG tablet  Commonly known as: PRINIVIL,ZESTRIL   5 mg, Oral, Daily      meclizine 12.5 MG tablet  Commonly known as: ANTIVERT   12.5 mg, Oral, 3 Times Daily PRN      multivitamin tablet tablet   1 tablet, Oral, Daily      omeprazole 20 MG capsule  Commonly known as: priLOSEC   20 mg, Oral, 2 Times Daily      vitamin D 1.25 MG (25090 UT) capsule capsule  Commonly known as: ERGOCALCIFEROL   50,000 Units, Oral, Every 7 Days, Sundays.               Follow-up Appointments  Future Appointments   Date Time Provider Department Center   9/19/2022 10:10 AM Howard Estrada MD [facility]         Howard Estrada MD   09/08/22   12:05 EDT

## 2022-09-08 NOTE — PLAN OF CARE
Goal Outcome Evaluation:           Progress: improving  Outcome Evaluation: Patient has done well today.  Participated with physical therapy.  [facility]

## 2022-09-08 NOTE — PLAN OF CARE
Goal Outcome Evaluation:              Outcome Evaluation: Pt rested well tonight. Ambulated to BSC with assistance and walker. Complained of pain, PRN medication given per MAR. Polar ice device in use. 1L NC in place. VSS. Will continue POC.

## 2022-09-15 ENCOUNTER — LAB REQUISITION (OUTPATIENT)
Dept: LAB | Facility: HOSPITAL | Age: 77
End: 2022-09-15

## 2022-09-15 DIAGNOSIS — E78.5 HYPERLIPIDEMIA, UNSPECIFIED: ICD-10-CM

## 2022-09-15 DIAGNOSIS — D64.9 ANEMIA, UNSPECIFIED: ICD-10-CM

## 2022-09-15 DIAGNOSIS — I27.20 PULMONARY HYPERTENSION, UNSPECIFIED: ICD-10-CM

## 2022-09-15 LAB
ALBUMIN SERPL-MCNC: 3.1 G/DL (ref 3.5–5.2)
ALBUMIN/GLOB SERPL: 1.1 G/DL
ALP SERPL-CCNC: 75 U/L (ref 39–117)
ALT SERPL W P-5'-P-CCNC: 16 U/L (ref 1–33)
ANION GAP SERPL CALCULATED.3IONS-SCNC: 12.2 MMOL/L (ref 5–15)
AST SERPL-CCNC: 15 U/L (ref 1–32)
BASOPHILS # BLD AUTO: 0.03 10*3/MM3 (ref 0–0.2)
BASOPHILS NFR BLD AUTO: 0.3 % (ref 0–1.5)
BILIRUB SERPL-MCNC: 0.2 MG/DL (ref 0–1.2)
BUN SERPL-MCNC: 10 MG/DL (ref 8–23)
BUN/CREAT SERPL: 13.2 (ref 7–25)
CALCIUM SPEC-SCNC: 9.2 MG/DL (ref 8.6–10.5)
CHLORIDE SERPL-SCNC: 106 MMOL/L (ref 98–107)
CHOLEST SERPL-MCNC: 133 MG/DL (ref 0–200)
CO2 SERPL-SCNC: 24.8 MMOL/L (ref 22–29)
CREAT SERPL-MCNC: 0.76 MG/DL (ref 0.57–1)
DEPRECATED RDW RBC AUTO: 47.8 FL (ref 37–54)
EGFRCR SERPLBLD CKD-EPI 2021: 80.3 ML/MIN/1.73
EOSINOPHIL # BLD AUTO: 0.26 10*3/MM3 (ref 0–0.4)
EOSINOPHIL NFR BLD AUTO: 3 % (ref 0.3–6.2)
ERYTHROCYTE [DISTWIDTH] IN BLOOD BY AUTOMATED COUNT: 13.4 % (ref 12.3–15.4)
GLOBULIN UR ELPH-MCNC: 2.9 GM/DL
GLUCOSE SERPL-MCNC: 118 MG/DL (ref 65–99)
HCT VFR BLD AUTO: 26.9 % (ref 34–46.6)
HDLC SERPL-MCNC: 41 MG/DL (ref 40–60)
HGB BLD-MCNC: 8.7 G/DL (ref 12–15.9)
IMM GRANULOCYTES # BLD AUTO: 0.03 10*3/MM3 (ref 0–0.05)
IMM GRANULOCYTES NFR BLD AUTO: 0.3 % (ref 0–0.5)
IRON 24H UR-MRATE: 20 MCG/DL (ref 37–145)
IRON SATN MFR SERPL: 9 % (ref 20–50)
LDLC SERPL CALC-MCNC: 75 MG/DL (ref 0–100)
LDLC/HDLC SERPL: 1.82 {RATIO}
LYMPHOCYTES # BLD AUTO: 2.19 10*3/MM3 (ref 0.7–3.1)
LYMPHOCYTES NFR BLD AUTO: 25.5 % (ref 19.6–45.3)
MAGNESIUM SERPL-MCNC: 1.8 MG/DL (ref 1.6–2.4)
MCH RBC QN AUTO: 31.6 PG (ref 26.6–33)
MCHC RBC AUTO-ENTMCNC: 32.3 G/DL (ref 31.5–35.7)
MCV RBC AUTO: 97.8 FL (ref 79–97)
MONOCYTES # BLD AUTO: 1.07 10*3/MM3 (ref 0.1–0.9)
MONOCYTES NFR BLD AUTO: 12.5 % (ref 5–12)
NEUTROPHILS NFR BLD AUTO: 5.01 10*3/MM3 (ref 1.7–7)
NEUTROPHILS NFR BLD AUTO: 58.4 % (ref 42.7–76)
NRBC BLD AUTO-RTO: 0 /100 WBC (ref 0–0.2)
PHOSPHATE SERPL-MCNC: 4.1 MG/DL (ref 2.5–4.5)
PLATELET # BLD AUTO: 380 10*3/MM3 (ref 140–450)
PMV BLD AUTO: 9.9 FL (ref 6–12)
POTASSIUM SERPL-SCNC: 3.9 MMOL/L (ref 3.5–5.2)
PROT SERPL-MCNC: 6 G/DL (ref 6–8.5)
RBC # BLD AUTO: 2.75 10*6/MM3 (ref 3.77–5.28)
SODIUM SERPL-SCNC: 143 MMOL/L (ref 136–145)
TIBC SERPL-MCNC: 218 MCG/DL (ref 298–536)
TRANSFERRIN SERPL-MCNC: 146 MG/DL (ref 200–360)
TRIGL SERPL-MCNC: 86 MG/DL (ref 0–150)
VLDLC SERPL-MCNC: 17 MG/DL (ref 5–40)
WBC NRBC COR # BLD: 8.59 10*3/MM3 (ref 3.4–10.8)

## 2022-09-15 PROCEDURE — 80053 COMPREHEN METABOLIC PANEL: CPT | Performed by: INTERNAL MEDICINE

## 2022-09-15 PROCEDURE — 80061 LIPID PANEL: CPT | Performed by: INTERNAL MEDICINE

## 2022-09-15 PROCEDURE — 85025 COMPLETE CBC W/AUTO DIFF WBC: CPT | Performed by: INTERNAL MEDICINE

## 2022-09-15 PROCEDURE — 83540 ASSAY OF IRON: CPT | Performed by: INTERNAL MEDICINE

## 2022-09-15 PROCEDURE — 84466 ASSAY OF TRANSFERRIN: CPT | Performed by: INTERNAL MEDICINE

## 2022-09-15 PROCEDURE — 84100 ASSAY OF PHOSPHORUS: CPT | Performed by: INTERNAL MEDICINE

## 2022-09-15 PROCEDURE — 83735 ASSAY OF MAGNESIUM: CPT | Performed by: INTERNAL MEDICINE

## 2022-09-19 ENCOUNTER — OFFICE VISIT (OUTPATIENT)
Dept: ORTHOPEDIC SURGERY | Facility: CLINIC | Age: 77
End: 2022-09-19

## 2022-09-19 ENCOUNTER — HOSPITAL ENCOUNTER (OUTPATIENT)
Dept: GENERAL RADIOLOGY | Facility: HOSPITAL | Age: 77
Discharge: HOME | End: 2022-09-19
Admitting: ORTHOPAEDIC SURGERY

## 2022-09-19 VITALS — BODY MASS INDEX: 34.51 KG/M2 | HEIGHT: 66 IN | WEIGHT: 214.73 LBS

## 2022-09-19 DIAGNOSIS — T81.49XA INFLAMMATION OF OPERATIVE INCISION: Primary | ICD-10-CM

## 2022-09-19 DIAGNOSIS — Z96.651 S/P TOTAL KNEE ARTHROPLASTY, RIGHT: ICD-10-CM

## 2022-09-19 DIAGNOSIS — M17.11 PRIMARY OSTEOARTHRITIS OF RIGHT KNEE: ICD-10-CM

## 2022-09-19 DIAGNOSIS — Z09 POSTOP CHECK: ICD-10-CM

## 2022-09-19 PROCEDURE — 99024 POSTOP FOLLOW-UP VISIT: CPT | Performed by: ORTHOPAEDIC SURGERY

## 2022-09-19 PROCEDURE — 73562 X-RAY EXAM OF KNEE 3: CPT

## 2022-09-19 PROCEDURE — 87205 SMEAR GRAM STAIN: CPT | Performed by: ORTHOPAEDIC SURGERY

## 2022-09-19 PROCEDURE — 87070 CULTURE OTHR SPECIMN AEROBIC: CPT | Performed by: ORTHOPAEDIC SURGERY

## 2022-09-19 PROCEDURE — 73562 X-RAY EXAM OF KNEE 3: CPT | Performed by: RADIOLOGY

## 2022-09-19 NOTE — PROGRESS NOTES
Patient: Raisa Hernández  YOB: 1945  Date of Encounter: 09/19/2022      Chief Complaint:   Chief Complaint   Patient presents with   • Right Knee - Follow-up, Post-op Knee     Procedure(s):09/06/2022  RIGHT TOTAL KNEE ARTHROPLASTY     Surgeon(s):  Howard Estrada MD               HPI:  Raisa Hernández, 77 y.o. female returns in postoperative follow-up right total knee arthroplasty performed 2 weeks ago.  [facility] she is making progress with her strength and motion.      Medical History:  Patient Active Problem List   Diagnosis   • Hypertensive disorder   • Thrombotic stroke (HCC)   • Hyperlipidemia   • Sleep apnea   • Status post total left knee replacement   • Angina pectoris (HCC)   • Premature atrial contraction   • Diplopia   • Cushingoid side effect of steroids (HCC)   • Leukocytosis   • Neuropathy   • Postmenopausal osteoporosis   • Pulmonary HTN (HCC)   • Squamous cell carcinoma of hand   • Vertical nystagmus   • Essential hypertension   • Pneumonia of right lung due to infectious organism, unspecified part of lung   • Hyponatremia   • Hypomagnesemia   • Chronic anemia   • Elevated CK   • Sepsis with acute respiratory failure without septic shock (HCC)   • Gastroesophageal reflux disease without esophagitis   • Other dysphagia   • Hiatal hernia   • Lower esophageal ring (Schatzki)   • Primary osteoarthritis of right knee     Past Medical History:   Diagnosis Date   • Arthritis    • Asthma    • Cancer (HCC)     colon   • Elevated cholesterol    • GERD (gastroesophageal reflux disease)    • Hyperlipidemia    • Hypertension 05/16/2016   • Sleep apnea    • Stroke (HCC) 05/16/2016 2009 2015         Surgical History:  Past Surgical History:   Procedure Laterality Date   • BRAVO PROCEDURE N/A 06/14/2022    Procedure: ESOPHAGOGASTRODUODENOSCOPY WITH DILITATION  AND WHEELER;  Surgeon: Robbie Winters MD;  Location[facility];  Service:  Gastroenterology;  Laterality: N/A;  GE JUNCTION 37CM  WHEELER PLACED AT 31CM   • CATARACT EXTRACTION Bilateral    • CHOLECYSTECTOMY     • COLON SURGERY     • KNEE SURGERY Left     arthroplasty   • TOTAL KNEE ARTHROPLASTY Right 9/6/2022    Procedure: RIGHT TOTAL KNEE ARTHROPLASTY;  Surgeon: Howard Estrada MD;  Location: [facility];  Service: Orthopedics;  Laterality: Right;         Radiographs:  Radiographs right knee show total knee arthroplasty good position and alignment.    Examination:  Examination right knee reveals midline incision intact other than 1 cm area midportion of the incision with scant drainage.  He demonstrates full extension with flexion to 105 degrees.        Assessment & Plan:  77 y.o. female presents in follow-up right total knee arthroplasty doing well other than drainage from the midportion of her incision cultures were taken.  Dressing is applied she will leave covered for 2 days she is placed on Bactrim DS 1 p.o. twice daily 10 days.  We will schedule follow-up in 6 weeks however if wound is not completely closed will return sooner.       Diagnosis Plan   1. Postop check           Cc:  Rodrigo Howe,               This document has been electronically signed by Howard Estrada MD   September 19, 2022 10:11 EDT

## 2022-09-20 ENCOUNTER — TELEPHONE (OUTPATIENT)
Dept: ORTHOPEDIC SURGERY | Facility: CLINIC | Age: 77
End: 2022-09-20

## 2022-09-20 NOTE — TELEPHONE ENCOUNTER
Spoke to Ashlyn at [facility] [phone] 09/19/2022 concerning an RX for patient Bactrim DS 1 po daily times 10 day. N-R. Patient will need to start it 09/19/2022, Ashlyn stated the patient will start the medication.

## 2022-09-22 LAB
BACTERIA SPEC AEROBE CULT: NORMAL
GRAM STN SPEC: NORMAL

## 2022-10-05 ENCOUNTER — TRANSCRIBE ORDERS (OUTPATIENT)
Dept: ADMINISTRATIVE | Facility: HOSPITAL | Age: 77
End: 2022-10-05

## 2022-10-05 DIAGNOSIS — M85.89 OSTEOPENIA OF MULTIPLE SITES: Primary | ICD-10-CM

## 2022-10-07 ENCOUNTER — HOSPITAL ENCOUNTER (OUTPATIENT)
Dept: BONE DENSITY | Facility: HOSPITAL | Age: 77
Discharge: HOME | End: 2022-10-07
Admitting: FAMILY MEDICINE

## 2022-10-07 DIAGNOSIS — M81.0 AGE RELATED OSTEOPOROSIS, UNSPECIFIED PATHOLOGICAL FRACTURE PRESENCE: ICD-10-CM

## 2022-10-07 PROCEDURE — 77080 DXA BONE DENSITY AXIAL: CPT

## 2022-10-07 PROCEDURE — 77080 DXA BONE DENSITY AXIAL: CPT | Performed by: RADIOLOGY

## 2022-10-31 ENCOUNTER — OFFICE VISIT (OUTPATIENT)
Dept: ORTHOPEDIC SURGERY | Facility: CLINIC | Age: 77
End: 2022-10-31

## 2022-10-31 VITALS — HEIGHT: 66 IN | WEIGHT: 214.73 LBS | BODY MASS INDEX: 34.51 KG/M2

## 2022-10-31 DIAGNOSIS — Z96.651 S/P TOTAL KNEE ARTHROPLASTY, RIGHT: Primary | ICD-10-CM

## 2022-10-31 PROCEDURE — 99024 POSTOP FOLLOW-UP VISIT: CPT | Performed by: ORTHOPAEDIC SURGERY

## 2022-11-02 ENCOUNTER — TELEPHONE (OUTPATIENT)
Dept: ORTHOPEDIC SURGERY | Facility: CLINIC | Age: 77
End: 2022-11-02

## 2022-11-02 NOTE — TELEPHONE ENCOUNTER
Spoke with patient concerning HH PT insurance only allows 6 visits, patient is to continue the PT program on her on.   Informed Dr. Estrada he offered outside PT. Patient stated she does not wish to go to an outside facility that she would continue exercising at home.

## (undated) DEVICE — PK KN TOTL 70

## (undated) DEVICE — DRSNG TELFA PAD NONADH STR 1S 3X8IN

## (undated) DEVICE — Device

## (undated) DEVICE — DISPOSABLE TOURNIQUET CUFF SINGLE BLADDER, SINGLE PORT AND LUER LOCK CONNECTOR: Brand: COLOR CUFF

## (undated) DEVICE — PAD WRAP/ON KN COOL/THERP W/ELAS/STRAP LF

## (undated) DEVICE — WRP COMPR KN COLD UNIV

## (undated) DEVICE — TBG PENCL TELESCP MEGADYNE SMOKE EVAC 10FT

## (undated) DEVICE — BOWL AND CEMENT CARTRIDGE WITH BREAKAWAY FEMORAL NOZZLE AND MEDIUM PRESSURIZER: Brand: ACM

## (undated) DEVICE — BNDG ELAS CO-FLEX SLF ADHR 4IN5YD LF STRL

## (undated) DEVICE — GLV SURG PREMIERPRO MIC LTX PF SZ8 BRN

## (undated) DEVICE — DEV INFL ALLIANCE2 SYS

## (undated) DEVICE — APPL CHLORAPREP HI/LITE 26ML ORNG

## (undated) DEVICE — RECIPROCATING BLADE, DOUBLE SIDED, OFFSET  (70.0 X 1.0 X 12.5MM)

## (undated) DEVICE — IMPLANTABLE DEVICE: Type: IMPLANTABLE DEVICE | Site: KNEE | Status: NON-FUNCTIONAL

## (undated) DEVICE — UNDERGLV SURG BIOGEL INDICAT PF 8 GRN

## (undated) DEVICE — 4-PORT MANIFOLD: Brand: NEPTUNE 2

## (undated) DEVICE — KNEE HOLDER DISPOSABLE LINER: Brand: ALVARADO®  KNEE SUPPORT

## (undated) DEVICE — SINGLE PORT MANIFOLD: Brand: NEPTUNE 2

## (undated) DEVICE — ESOPHAGEAL BALLOON DILATATION CATHETER: Brand: CRE FIXED WIRE

## (undated) DEVICE — SIGMA LCS HIGH PERFORMANCE INSTRUMENTS STERILE QUICK DRILL PINS: Brand: SIGMA LCS HIGH PERFORMANCE

## (undated) DEVICE — SYS COLD/THRP POLAR/CARE/CUBE

## (undated) DEVICE — IMMOB KN 3PNL DLX CANVS 19IN BLU

## (undated) DEVICE — HANDPIECE SET WITH COAXIAL HIGH FLOW TIP AND SUCTION TUBE: Brand: INTERPULSE

## (undated) DEVICE — SUT ETHIB NO 2 X519H

## (undated) DEVICE — SUT MNCRYL PLS ANTIB UD 2/0 CP1 27IN

## (undated) DEVICE — STRYKER PERFORMANCE SERIES SAGITTAL BLADE: Brand: STRYKER PERFORMANCE SERIES

## (undated) DEVICE — TUBING, SUCTION, 1/4" X 20', STRAIGHT: Brand: MEDLINE INDUSTRIES, INC.

## (undated) DEVICE — CAPS TRANSMTR ENDOSC PH MONTR BRAVO

## (undated) DEVICE — HOLDER: Brand: DEROYAL

## (undated) DEVICE — FRCP BX RADJAW4 NDL 2.8 240CM LG OG BX40

## (undated) DEVICE — PK KN TOTL ADDON 70

## (undated) DEVICE — NDL HYPO ECLPS SFTY 18G 1 1/2IN

## (undated) DEVICE — SYS CLS SKIN PREMIERPRO EXOFINFUSION 22CM

## (undated) DEVICE — Device: Brand: DEFENDO AIR/WATER/SUCTION AND BIOPSY VALVE

## (undated) DEVICE — SIGMA LCS HIGH PERFORMANCE STERILE THREADED HEADED PINS: Brand: SIGMA LCS HIGH PERFORMANCE

## (undated) DEVICE — PATIENT RETURN ELECTRODE, SINGLE-USE, CONTACT QUALITY MONITORING, ADULT, WITH 9FT CORD, FOR PATIENTS WEIGING OVER 33LBS. (15KG): Brand: MEGADYNE